# Patient Record
Sex: MALE | Race: BLACK OR AFRICAN AMERICAN | NOT HISPANIC OR LATINO | URBAN - METROPOLITAN AREA
[De-identification: names, ages, dates, MRNs, and addresses within clinical notes are randomized per-mention and may not be internally consistent; named-entity substitution may affect disease eponyms.]

---

## 2018-12-23 ENCOUNTER — EMERGENCY (EMERGENCY)
Facility: HOSPITAL | Age: 83
LOS: 0 days | Discharge: HOME | End: 2018-12-23
Attending: EMERGENCY MEDICINE
Payer: MEDICARE

## 2018-12-23 VITALS
RESPIRATION RATE: 16 BRPM | HEART RATE: 85 BPM | OXYGEN SATURATION: 98 % | SYSTOLIC BLOOD PRESSURE: 151 MMHG | TEMPERATURE: 99 F | DIASTOLIC BLOOD PRESSURE: 77 MMHG

## 2018-12-23 VITALS
HEART RATE: 64 BPM | OXYGEN SATURATION: 98 % | RESPIRATION RATE: 19 BRPM | TEMPERATURE: 98 F | DIASTOLIC BLOOD PRESSURE: 72 MMHG | WEIGHT: 160.06 LBS | SYSTOLIC BLOOD PRESSURE: 115 MMHG

## 2018-12-23 DIAGNOSIS — I10 ESSENTIAL (PRIMARY) HYPERTENSION: ICD-10-CM

## 2018-12-23 DIAGNOSIS — R42 DIZZINESS AND GIDDINESS: ICD-10-CM

## 2018-12-23 DIAGNOSIS — I49.9 CARDIAC ARRHYTHMIA, UNSPECIFIED: ICD-10-CM

## 2018-12-23 DIAGNOSIS — E11.9 TYPE 2 DIABETES MELLITUS WITHOUT COMPLICATIONS: ICD-10-CM

## 2018-12-23 DIAGNOSIS — N39.0 URINARY TRACT INFECTION, SITE NOT SPECIFIED: ICD-10-CM

## 2018-12-23 DIAGNOSIS — Z79.4 LONG TERM (CURRENT) USE OF INSULIN: ICD-10-CM

## 2018-12-23 DIAGNOSIS — Z79.82 LONG TERM (CURRENT) USE OF ASPIRIN: ICD-10-CM

## 2018-12-23 LAB
ALBUMIN SERPL ELPH-MCNC: 3.3 G/DL — SIGNIFICANT CHANGE UP (ref 3.3–5)
ALP SERPL-CCNC: 57 U/L — SIGNIFICANT CHANGE UP (ref 40–120)
ALT FLD-CCNC: 14 U/L — SIGNIFICANT CHANGE UP (ref 12–78)
ANION GAP SERPL CALC-SCNC: 6 MMOL/L — SIGNIFICANT CHANGE UP (ref 5–17)
APPEARANCE UR: ABNORMAL
AST SERPL-CCNC: 16 U/L — SIGNIFICANT CHANGE UP (ref 15–37)
BILIRUB SERPL-MCNC: 0.4 MG/DL — SIGNIFICANT CHANGE UP (ref 0.2–1.2)
BILIRUB UR-MCNC: NEGATIVE — SIGNIFICANT CHANGE UP
BUN SERPL-MCNC: 29 MG/DL — HIGH (ref 7–23)
CALCIUM SERPL-MCNC: 9 MG/DL — SIGNIFICANT CHANGE UP (ref 8.5–10.1)
CHLORIDE SERPL-SCNC: 108 MMOL/L — SIGNIFICANT CHANGE UP (ref 96–108)
CO2 SERPL-SCNC: 30 MMOL/L — SIGNIFICANT CHANGE UP (ref 22–31)
COLOR SPEC: YELLOW — SIGNIFICANT CHANGE UP
CREAT SERPL-MCNC: 1.57 MG/DL — HIGH (ref 0.5–1.3)
DIFF PNL FLD: ABNORMAL
FLUAV SPEC QL CULT: NEGATIVE — SIGNIFICANT CHANGE UP
FLUBV AG SPEC QL IA: NEGATIVE — SIGNIFICANT CHANGE UP
GLUCOSE SERPL-MCNC: 83 MG/DL — SIGNIFICANT CHANGE UP (ref 70–99)
GLUCOSE UR QL: NEGATIVE MG/DL — SIGNIFICANT CHANGE UP
HCT VFR BLD CALC: 42.5 % — SIGNIFICANT CHANGE UP (ref 39–50)
HGB BLD-MCNC: 13.4 G/DL — SIGNIFICANT CHANGE UP (ref 13–17)
KETONES UR-MCNC: NEGATIVE — SIGNIFICANT CHANGE UP
LEUKOCYTE ESTERASE UR-ACNC: ABNORMAL
MCHC RBC-ENTMCNC: 28.9 PG — SIGNIFICANT CHANGE UP (ref 27–34)
MCHC RBC-ENTMCNC: 31.5 GM/DL — LOW (ref 32–36)
MCV RBC AUTO: 91.6 FL — SIGNIFICANT CHANGE UP (ref 80–100)
NITRITE UR-MCNC: POSITIVE
NRBC # BLD: 0 /100 WBCS — SIGNIFICANT CHANGE UP (ref 0–0)
PH UR: 5 — SIGNIFICANT CHANGE UP (ref 5–8)
PLATELET # BLD AUTO: 151 K/UL — SIGNIFICANT CHANGE UP (ref 150–400)
POTASSIUM SERPL-MCNC: 4.4 MMOL/L — SIGNIFICANT CHANGE UP (ref 3.5–5.3)
POTASSIUM SERPL-SCNC: 4.4 MMOL/L — SIGNIFICANT CHANGE UP (ref 3.5–5.3)
PROT SERPL-MCNC: 8.4 GM/DL — HIGH (ref 6–8.3)
PROT UR-MCNC: 30 MG/DL
RBC # BLD: 4.64 M/UL — SIGNIFICANT CHANGE UP (ref 4.2–5.8)
RBC # FLD: 13.2 % — SIGNIFICANT CHANGE UP (ref 10.3–14.5)
SODIUM SERPL-SCNC: 144 MMOL/L — SIGNIFICANT CHANGE UP (ref 135–145)
SP GR SPEC: 1.01 — SIGNIFICANT CHANGE UP (ref 1.01–1.02)
TROPONIN I SERPL-MCNC: <.015 NG/ML — SIGNIFICANT CHANGE UP (ref 0.01–0.04)
UROBILINOGEN FLD QL: NEGATIVE MG/DL — SIGNIFICANT CHANGE UP
WBC # BLD: 6.71 K/UL — SIGNIFICANT CHANGE UP (ref 3.8–10.5)
WBC # FLD AUTO: 6.71 K/UL — SIGNIFICANT CHANGE UP (ref 3.8–10.5)

## 2018-12-23 PROCEDURE — 71045 X-RAY EXAM CHEST 1 VIEW: CPT | Mod: 26

## 2018-12-23 PROCEDURE — 70450 CT HEAD/BRAIN W/O DYE: CPT | Mod: 26

## 2018-12-23 PROCEDURE — 93010 ELECTROCARDIOGRAM REPORT: CPT

## 2018-12-23 PROCEDURE — 99285 EMERGENCY DEPT VISIT HI MDM: CPT

## 2018-12-23 RX ORDER — CIPROFLOXACIN LACTATE 400MG/40ML
1 VIAL (ML) INTRAVENOUS
Qty: 10 | Refills: 0 | OUTPATIENT
Start: 2018-12-23 | End: 2019-01-01

## 2018-12-23 RX ORDER — MECLIZINE HCL 12.5 MG
50 TABLET ORAL ONCE
Qty: 0 | Refills: 0 | Status: COMPLETED | OUTPATIENT
Start: 2018-12-23 | End: 2018-12-23

## 2018-12-23 RX ORDER — SODIUM CHLORIDE 9 MG/ML
1000 INJECTION INTRAMUSCULAR; INTRAVENOUS; SUBCUTANEOUS ONCE
Qty: 0 | Refills: 0 | Status: COMPLETED | OUTPATIENT
Start: 2018-12-23 | End: 2018-12-23

## 2018-12-23 RX ORDER — CIPROFLOXACIN LACTATE 400MG/40ML
500 VIAL (ML) INTRAVENOUS ONCE
Qty: 0 | Refills: 0 | Status: DISCONTINUED | OUTPATIENT
Start: 2018-12-23 | End: 2018-12-23

## 2018-12-23 RX ORDER — MECLIZINE HCL 12.5 MG
1 TABLET ORAL
Qty: 15 | Refills: 0
Start: 2018-12-23 | End: 2018-12-27

## 2018-12-23 RX ADMIN — Medication 50 MILLIGRAM(S): at 17:47

## 2018-12-23 RX ADMIN — SODIUM CHLORIDE 1000 MILLILITER(S): 9 INJECTION INTRAMUSCULAR; INTRAVENOUS; SUBCUTANEOUS at 18:50

## 2018-12-23 RX ADMIN — SODIUM CHLORIDE 1000 MILLILITER(S): 9 INJECTION INTRAMUSCULAR; INTRAVENOUS; SUBCUTANEOUS at 17:48

## 2018-12-23 NOTE — ED PROVIDER NOTE - MEDICAL DECISION MAKING DETAILS
89 yo M with veritgo, presyncope, r/o intracranial event   -CT brain, r/o other cause of vertigo, basic labs, ua, cx, flu swab, trop, cxr, ekg, meclizine, ns hydration, levo for UTI

## 2018-12-23 NOTE — ED ADULT NURSE NOTE - OBJECTIVE STATEMENT
a/o x4 c/o not feeling good yesterday, fell backwards in bed when he tries to get up, today he c/o dizziness and headache.

## 2018-12-23 NOTE — ED ADULT NURSE NOTE - PMH
Arrhythmia, long term    Diabetes    Dislocation of hip, left, closed  - repeated 3 times, last on 01/21/16  Hypertension

## 2018-12-23 NOTE — ED PROVIDER NOTE - OBJECTIVE STATEMENT
89 yo M with dizziness that started after using the bathroom earlier today (>6 hours ago).  Pt. says it felt like spinning, but almost like he was going to pass out.  He felt nauseas, didn't vomit.  He was better after laying down.  Daughter thought he should go to the ER for eval.  Pt. fought her but then accepted.  Pt. has no other complaints or associated symptoms.  At baseline mental status as per daughter.    ROS: negative for fever, cough, headache, chest pain, shortness of breath, abd pain, nausea, vomiting, diarrhea, rash, paresthesia, and weakness--all other systems reviewed are negative.   PMH: DM, HTN, a-fib, L hip replacement w/ multiple dislocations; Meds: See EMR; SH: Denies smoking/drinking/drug use

## 2018-12-23 NOTE — ED ADULT NURSE NOTE - TEMPLATE
After Visit Summary   5/4/2018    Shahana Donaldson    MRN: [id number]           Patient Information     Date Of Birth          1940        Visit Information        Provider Department      5/4/2018 7:05 AM Jeramie Curran MD [facility]        Today's Diagnoses     Chronic pain of right knee    -  1      Care Instructions    Primary Care Center: [phone]     Primary Care Center Medication Refill Request Information:  * Please contact your pharmacy regarding ANY request for medication refills.  ** [facility] Prescription Fax = [phone]  * Please allow 3 business days for routine medication refills.  * Please allow 5 business days for controlled substance medication refills.     Primary Care Center Test Result notification information:  *You will be notified with in 7-10 days of your appointment day regarding the results of your test.  If you are on MyChart you will be notified as soon as the provider has reviewed the results and signed off on them.      Schedule Physical Therapy             Follow-ups after your visit        Additional Services     PHYSICAL THERAPY REFERRAL       Knee pain                  Your next 10 appointments already scheduled     May 09, 2018 11:00 AM CDT   XR ESOPHAGRAM with UCXR2, UC GIGU RAD   [facility] ([facility])    [address] [address] [phone]           Please bring a list of your current medicines to your exam. (Include vitamins, minerals and over-the-counter medicines.) Leave your valuables at home.  Tell the doctor if there is a chance you could be pregnant.  Do not eat for 4 hours before the exam. Keep drinking clear liquids until 2 hours before the exam.  You may take pain medicine (with a sip of water) up to 4 hours before the exam.  Do not swallow any other medicines unless your doctor tells you to. Talk to your doctor to be sure it s safe to stop  "your medicines.  Please call the Imaging Department at your exam site with any questions.            May 09, 2018 12:00 PM CDT   (Arrive by 11:45 AM)   Return Visit with Leonidas Valle MD   [facility] ([facility])    [address]  [address]  [facility] [address]   [phone]            May 29, 2018  8:15 AM CDT   RETURN GENERAL with Josue Trujillo MD   [facility] ([facility])    [address]  [address] [address]  [facility] [facility] [address]   [phone]            Nov 21, 2018  7:05 AM CST   (Arrive by 6:50 AM)   Return Visit with Jeramie Curran MD   [facility] ([facility])    [address]  4th [address]  [facility] [id number] [phone]            Nov 21, 2018 10:00 AM CST   MA SCREENING DIGITAL BILATERAL with [id number]   [facility] ([facility])    [address]  2nd Floor  [facility] [id number] [phone]           Do not use any powder, lotion or deodorant under your arms or on your breast. If you do, we will ask you to remove it before your exam.  Wear comfortable, two-piece clothing.  If you have any allergies, tell your care team.  Bring any previous mammograms from other facilities or have them mailed to the breast center. Three-dimensional (3D) mammograms are available at [place] locations in [facility], [facility], [facility], and [place]. [facility] locations include [place] [facility] in [place]. Benefits of 3D mammograms include: - Improved rate of cancer detection - Decreases your chance of having to go back for more tests, which means fewer: - \"False-positive\" results (This means that there is an abnormal area but it isn't cancer.) - Invasive testing procedures, such as a biopsy or surgery - Can provide clearer " images of the breast if you have dense breast tissue. 3D mammography is an optional exam that anyone can have with a 2D mammogram. It doesn't replace or take the place of a 2D mammogram. 2D mammograms remain an effective screening test for all women.  Not all insurance companies cover the cost of a 3D mammogram. Check with your insurance.              Who to contact     Please call your clinic at [phone] to:    Ask questions about your health    Make or cancel appointments    Discuss your medicines    Learn about your test results    Speak to your doctor            Additional Information About Your Visit        [employer] Information     [email or web address] gives you secure access to your electronic health record. If you see a primary care provider, you can also send messages to your care team and make appointments. If you have questions, please call your primary care clinic.  If you do not have a primary care provider, please call [phone] and they will assist you.      [email or web address] is an electronic gateway that provides easy, online access to your medical records. With [email or web address], you can request a clinic appointment, read your test results, renew a prescription or communicate with your care team.     To access your existing account, please contact your [facility] or call [phone] for assistance.        Care EveryWhere ID     This is your Care EveryWhere ID. This could be used by other organizations to access your [facility] medical records  [id number]        Your Vitals Were     Pulse Respirations Pulse Oximetry BMI (Body Mass Index)          79 20 96% 23.4 kg/m2         Blood Pressure from Last 3 Encounters:   05/04/18 155/80   04/17/18 158/74   04/03/18 158/90    Weight from Last 3 Encounters:   05/04/18 69.8 kg (153 lb 14.4 oz)   04/17/18 69.8 kg (153 lb 12.8 oz)   04/03/18 69.4 kg (153 lb)              We Performed the Following     PHYSICAL THERAPY REFERRAL        Primary Care Provider  Office Phone # Fax #    Jeramie Curran [phone] [address] [address]        Equal Access to Services     ERNESTO NEFF : Hadii aad ku hadjulio Valdovinos, waednada luqadaha, qaybta kaalmada asad, celso russell laDoreenjenise crane. So [facility] [phone].    ATENCIÓN: Si habla español, tiene a quiroz disposición servicios gratuitos de asistencia lingüística. Llame al [phone].    We comply with applicable federal civil rights laws and [place] laws. We do not discriminate on the basis of race, color, national origin, age, disability, sex, sexual orientation, or gender identity.            Thank you!     Thank you for choosing [facility]  for your care. Our goal is always to provide you with excellent care. Hearing back from our patients is one way we can continue to improve our services. Please take a few minutes to complete the written survey that you may receive in the mail after your visit with us. Thank you!             Your Updated Medication List - Protect others around you: Learn how to safely use, store and throw away your medicines at www.disposemymeds.org.          This list is accurate as of 5/4/18  7:22 AM.  Always use your most recent med list.                   Brand Name Dispense Instructions for use Diagnosis    ascorbic acid 500 MG tablet    VITAMIN C     Take 500 mg by mouth as needed.        aspirin 81 MG tablet     100    1 tab po QD (Once per day)    Mixed hyperlipidemia       atorvastatin 10 MG tablet    LIPITOR    90 tablet    Take 1 tablet (10 mg) by mouth daily    Mixed hyperlipidemia       cholecalciferol 1000 UNIT tablet    vitamin D3     Take 1 tablet by mouth daily.        clobetasol 0.05 % ointment    TEMOVATE    30 g    Apply sparingly to affected area 2 x daily for14 days. Then, apply small amount to affected area twice weekly for maintenance.    Lichen sclerosus       diclofenac 1 % Gel topical gel     VOLTAREN    100 g    Apply 4 grams to knees or 2 grams to hands four times daily using enclosed dosing card.    Right knee pain       DILT- MG 24 hr capsule   Generic drug:  diltiazem     90 capsule    TAKE 1 CAPSULE BY MOUTH ONCE DAILY    Essential hypertension       diphenhydrAMINE 25 MG tablet    BENADRYL    20 tablet    Take 1 tablet (25 mg) by mouth nightly as needed for itching or allergies    Acute sinusitis       omeprazole 40 MG capsule    priLOSEC    90 capsule    Take 1 capsule (40 mg) by mouth daily as needed    S/P Nissen fundoplication (without gastrostomy tube) procedure       PROBIOTIC FORMULA PO      Take by mouth as needed    Routine gynecological examination, Atypical chest pain          Neuro

## 2018-12-23 NOTE — ED PROVIDER NOTE - PHYSICAL EXAMINATION
Vitals: WNL  Gen: AAOx3, NAD, sitting comfortably in stretcher, calm, cooperative, non-toxic, answering question appropriately,   Head: ncat, perrla, eomi b/l  Neck: supple, no lymphadenopathy, no midline deviation  Heart: rrr, no m/r/g  Lungs: CTA b/l, no rales/ronchi/wheezes  Abd: soft, nontender, non-distended, no rebound or guarding  Ext: no clubbing/cyanosis/edema  Neuro: sensation and muscle strength intact b/l, no focal weakness

## 2018-12-23 NOTE — ED ADULT TRIAGE NOTE - CHIEF COMPLAINT QUOTE
patient c/o of headache and dizziness , patient also c/o of weakness , denied chest pain , denied difficulty breathing , denied N/V denied blurred vision

## 2019-03-17 ENCOUNTER — EMERGENCY (EMERGENCY)
Facility: HOSPITAL | Age: 84
LOS: 0 days | Discharge: HOME | End: 2019-03-18
Attending: EMERGENCY MEDICINE
Payer: MEDICARE

## 2019-03-17 VITALS
DIASTOLIC BLOOD PRESSURE: 82 MMHG | RESPIRATION RATE: 20 BRPM | WEIGHT: 164.91 LBS | OXYGEN SATURATION: 98 % | HEIGHT: 73 IN | SYSTOLIC BLOOD PRESSURE: 136 MMHG | TEMPERATURE: 98 F | HEART RATE: 90 BPM

## 2019-03-17 DIAGNOSIS — R53.1 WEAKNESS: ICD-10-CM

## 2019-03-17 DIAGNOSIS — R53.81 OTHER MALAISE: ICD-10-CM

## 2019-03-17 DIAGNOSIS — Z79.82 LONG TERM (CURRENT) USE OF ASPIRIN: ICD-10-CM

## 2019-03-17 DIAGNOSIS — I10 ESSENTIAL (PRIMARY) HYPERTENSION: ICD-10-CM

## 2019-03-17 DIAGNOSIS — I48.91 UNSPECIFIED ATRIAL FIBRILLATION: ICD-10-CM

## 2019-03-17 DIAGNOSIS — Z79.899 OTHER LONG TERM (CURRENT) DRUG THERAPY: ICD-10-CM

## 2019-03-17 DIAGNOSIS — Z96.642 PRESENCE OF LEFT ARTIFICIAL HIP JOINT: ICD-10-CM

## 2019-03-17 DIAGNOSIS — E11.9 TYPE 2 DIABETES MELLITUS WITHOUT COMPLICATIONS: ICD-10-CM

## 2019-03-17 PROCEDURE — 70450 CT HEAD/BRAIN W/O DYE: CPT | Mod: 26

## 2019-03-17 PROCEDURE — 71045 X-RAY EXAM CHEST 1 VIEW: CPT | Mod: 26

## 2019-03-17 PROCEDURE — 99285 EMERGENCY DEPT VISIT HI MDM: CPT

## 2019-03-17 RX ORDER — SODIUM CHLORIDE 9 MG/ML
1000 INJECTION INTRAMUSCULAR; INTRAVENOUS; SUBCUTANEOUS ONCE
Qty: 0 | Refills: 0 | Status: COMPLETED | OUTPATIENT
Start: 2019-03-17 | End: 2019-03-17

## 2019-03-17 RX ADMIN — SODIUM CHLORIDE 1000 MILLILITER(S): 9 INJECTION INTRAMUSCULAR; INTRAVENOUS; SUBCUTANEOUS at 23:41

## 2019-03-17 NOTE — ED PROVIDER NOTE - PHYSICAL EXAMINATION
Vitals: WNL  Gen: AAOx2, NAD, sitting comfortably in stretcher  Head: ncat, perrla, eomi b/l  Neck: supple, no lymphadenopathy, no midline deviation  Heart: rrr, no m/r/g  Lungs: CTA b/l, no rales/ronchi/wheezes  Abd: soft, nontender, non-distended, no rebound or guarding  Ext: no clubbing/cyanosis/edema  Neuro: sensation and muscle strength intact b/l, no focal weakness, CN2-12 intact b/l

## 2019-03-17 NOTE — ED PROVIDER NOTE - OBJECTIVE STATEMENT
89 yo M with general weakness x 2 days.  Pt. went to urgent care who was concerned about EKG and oxygen saturation.  Pt. was referred to ER.  Pt. has had UTIs in the past, with similar presentations to current.  No other complaints.  Pt. is otherwise at baseline, and well. 89 yo M with general weakness x 2 days.  Pt. went to urgent care who was concerned about EKG and oxygen saturation.  Pt. was referred to ER.  Pt. has had UTIs in the past, with similar presentations to current.  No other complaints.  Pt. is otherwise at baseline, and well.  ROS: negative for fever, cough, headache, chest pain, shortness of breath, abd pain, nausea, vomiting, diarrhea, rash, paresthesia, and focal weakness--all other systems reviewed are negative.   PMH: DM, HTN, a-fib, L hip replacement w/ multiple dislocations; Meds: See EMR; SH: Denies smoking/drinking/drug use

## 2019-03-17 NOTE — ED PROVIDER NOTE - CLINICAL SUMMARY MEDICAL DECISION MAKING FREE TEXT BOX
89 yo M with generalized weakness, likely UTI, r/o other infectious cause, doubt ischemia  -basic labs, coags, ckmb, trop, coags, ua, cx, bnp, ct brain, monitor, iv line, ns hydration, ekg

## 2019-03-17 NOTE — ED ADULT TRIAGE NOTE - CHIEF COMPLAINT QUOTE
c/o generalized weakness x 2 days, decreased appetite seen at urgent care and recommended to have eval for low o2 sat on r/a in er states ekg done at urgent care shows irregular heartbeat pt with known hx afib denies chest pain states intermittent shortness of breath, hx chf

## 2019-03-17 NOTE — ED PROVIDER NOTE - PROGRESS NOTE DETAILS
Results reported to patient--grossly benign, labs wnl, ua shows UTI  Pt. reports feeling better after meds  pt. agrees to f/u with primary care outpt.  pt. understands to return to ED if symptoms worsen; will d/c

## 2019-03-17 NOTE — ED ADULT NURSE NOTE - OBJECTIVE STATEMENT
Patient reports "I have been dizzy. My heartbeat has been irregular and my breathing pattern has change. The usual arthritic pain."

## 2019-03-17 NOTE — ED ADULT NURSE NOTE - NSIMPLEMENTINTERV_GEN_ALL_ED
Implemented All Fall with Harm Risk Interventions:  [place] to call system. Call bell, personal items and telephone within reach. Instruct patient to call for assistance. Room bathroom lighting operational. Non-slip footwear when patient is off stretcher. Physically safe environment: no spills, clutter or unnecessary equipment. Stretcher in lowest position, wheels locked, appropriate side rails in place. Provide visual cue, wrist band, yellow gown, etc. Monitor gait and stability. Monitor for mental status changes and reorient to person, place, and time. Review medications for side effects contributing to fall risk. Reinforce activity limits and safety measures with patient and family. Provide visual clues: red socks.

## 2019-03-17 NOTE — ED ADULT NURSE NOTE - CHPI ED NUR SYMPTOMS NEG
no loss of consciousness/no blurred vision/no change in level of consciousness/no confusion/no numbness/no fever/no nausea/no vomiting

## 2019-03-18 VITALS
HEART RATE: 96 BPM | DIASTOLIC BLOOD PRESSURE: 93 MMHG | RESPIRATION RATE: 13 BRPM | OXYGEN SATURATION: 100 % | TEMPERATURE: 98 F | SYSTOLIC BLOOD PRESSURE: 137 MMHG

## 2019-03-18 LAB
ALBUMIN SERPL ELPH-MCNC: 3.4 G/DL — SIGNIFICANT CHANGE UP (ref 3.3–5)
ALP SERPL-CCNC: 62 U/L — SIGNIFICANT CHANGE UP (ref 40–120)
ALT FLD-CCNC: 18 U/L — SIGNIFICANT CHANGE UP (ref 12–78)
ANION GAP SERPL CALC-SCNC: 2 MMOL/L — LOW (ref 5–17)
APPEARANCE UR: CLEAR — SIGNIFICANT CHANGE UP
APTT BLD: 28.1 SEC — SIGNIFICANT CHANGE UP (ref 27.5–36.3)
AST SERPL-CCNC: 21 U/L — SIGNIFICANT CHANGE UP (ref 15–37)
BACTERIA # UR AUTO: ABNORMAL
BILIRUB SERPL-MCNC: 0.3 MG/DL — SIGNIFICANT CHANGE UP (ref 0.2–1.2)
BILIRUB UR-MCNC: NEGATIVE — SIGNIFICANT CHANGE UP
BUN SERPL-MCNC: 25 MG/DL — HIGH (ref 7–23)
CALCIUM SERPL-MCNC: 8.7 MG/DL — SIGNIFICANT CHANGE UP (ref 8.5–10.1)
CHLORIDE SERPL-SCNC: 106 MMOL/L — SIGNIFICANT CHANGE UP (ref 96–108)
CK MB CFR SERPL CALC: 1.1 NG/ML — SIGNIFICANT CHANGE UP (ref 0.5–3.6)
CO2 SERPL-SCNC: 33 MMOL/L — HIGH (ref 22–31)
COLOR SPEC: YELLOW — SIGNIFICANT CHANGE UP
CREAT SERPL-MCNC: 1.66 MG/DL — HIGH (ref 0.5–1.3)
DIFF PNL FLD: ABNORMAL
EPI CELLS # UR: SIGNIFICANT CHANGE UP
GLUCOSE SERPL-MCNC: 81 MG/DL — SIGNIFICANT CHANGE UP (ref 70–99)
GLUCOSE UR QL: NEGATIVE MG/DL — SIGNIFICANT CHANGE UP
HCT VFR BLD CALC: 45.8 % — SIGNIFICANT CHANGE UP (ref 39–50)
HGB BLD-MCNC: 14.4 G/DL — SIGNIFICANT CHANGE UP (ref 13–17)
INR BLD: 1.12 RATIO — SIGNIFICANT CHANGE UP (ref 0.88–1.16)
KETONES UR-MCNC: NEGATIVE — SIGNIFICANT CHANGE UP
LEUKOCYTE ESTERASE UR-ACNC: ABNORMAL
MCHC RBC-ENTMCNC: 28.7 PG — SIGNIFICANT CHANGE UP (ref 27–34)
MCHC RBC-ENTMCNC: 31.4 GM/DL — LOW (ref 32–36)
MCV RBC AUTO: 91.2 FL — SIGNIFICANT CHANGE UP (ref 80–100)
NITRITE UR-MCNC: NEGATIVE — SIGNIFICANT CHANGE UP
NRBC # BLD: 0 /100 WBCS — SIGNIFICANT CHANGE UP (ref 0–0)
NT-PROBNP SERPL-SCNC: 751 PG/ML — HIGH (ref 0–450)
PH UR: 5 — SIGNIFICANT CHANGE UP (ref 5–8)
PLATELET # BLD AUTO: 134 K/UL — LOW (ref 150–400)
POTASSIUM SERPL-MCNC: 4.3 MMOL/L — SIGNIFICANT CHANGE UP (ref 3.5–5.3)
POTASSIUM SERPL-SCNC: 4.3 MMOL/L — SIGNIFICANT CHANGE UP (ref 3.5–5.3)
PROT SERPL-MCNC: 8 GM/DL — SIGNIFICANT CHANGE UP (ref 6–8.3)
PROT UR-MCNC: 15 MG/DL
PROTHROM AB SERPL-ACNC: 12.6 SEC — SIGNIFICANT CHANGE UP (ref 10–12.9)
RBC # BLD: 5.02 M/UL — SIGNIFICANT CHANGE UP (ref 4.2–5.8)
RBC # FLD: 12.8 % — SIGNIFICANT CHANGE UP (ref 10.3–14.5)
RBC CASTS # UR COMP ASSIST: SIGNIFICANT CHANGE UP /HPF (ref 0–4)
SODIUM SERPL-SCNC: 141 MMOL/L — SIGNIFICANT CHANGE UP (ref 135–145)
SP GR SPEC: 1.01 — SIGNIFICANT CHANGE UP (ref 1.01–1.02)
TROPONIN I SERPL-MCNC: <.015 NG/ML — SIGNIFICANT CHANGE UP (ref 0.01–0.04)
UROBILINOGEN FLD QL: NEGATIVE MG/DL — SIGNIFICANT CHANGE UP
WBC # BLD: 7.6 K/UL — SIGNIFICANT CHANGE UP (ref 3.8–10.5)
WBC # FLD AUTO: 7.6 K/UL — SIGNIFICANT CHANGE UP (ref 3.8–10.5)
WBC UR QL: ABNORMAL

## 2019-03-18 RX ORDER — MOXIFLOXACIN HYDROCHLORIDE TABLETS, 400 MG 400 MG/1
1 TABLET, FILM COATED ORAL
Qty: 14 | Refills: 0
Start: 2019-03-18 | End: 2019-03-24

## 2019-03-18 RX ORDER — CIPROFLOXACIN LACTATE 400MG/40ML
500 VIAL (ML) INTRAVENOUS ONCE
Qty: 0 | Refills: 0 | Status: COMPLETED | OUTPATIENT
Start: 2019-03-18 | End: 2019-03-18

## 2019-03-18 RX ADMIN — SODIUM CHLORIDE 1000 MILLILITER(S): 9 INJECTION INTRAMUSCULAR; INTRAVENOUS; SUBCUTANEOUS at 02:22

## 2019-03-18 RX ADMIN — Medication 500 MILLIGRAM(S): at 02:25

## 2019-06-21 ENCOUNTER — INPATIENT (INPATIENT)
Facility: HOSPITAL | Age: 84
LOS: 10 days | Discharge: NURSING, REHAB OR LONG-TERM CARE | End: 2019-07-02
Attending: INTERNAL MEDICINE | Admitting: INTERNAL MEDICINE
Payer: MEDICARE

## 2019-06-21 VITALS
RESPIRATION RATE: 16 BRPM | TEMPERATURE: 100 F | DIASTOLIC BLOOD PRESSURE: 74 MMHG | OXYGEN SATURATION: 96 % | HEART RATE: 98 BPM | SYSTOLIC BLOOD PRESSURE: 126 MMHG

## 2019-06-21 DIAGNOSIS — N39.0 URINARY TRACT INFECTION, SITE NOT SPECIFIED: ICD-10-CM

## 2019-06-21 LAB
ALBUMIN SERPL ELPH-MCNC: 4.2 G/DL — SIGNIFICANT CHANGE UP (ref 3.3–5)
ALP SERPL-CCNC: 60 U/L — SIGNIFICANT CHANGE UP (ref 40–120)
ALT FLD-CCNC: 11 U/L — SIGNIFICANT CHANGE UP (ref 4–41)
ANION GAP SERPL CALC-SCNC: 11 MMO/L — SIGNIFICANT CHANGE UP (ref 7–14)
APPEARANCE UR: CLEAR — SIGNIFICANT CHANGE UP
APTT BLD: 27.2 SEC — LOW (ref 27.5–36.3)
AST SERPL-CCNC: 22 U/L — SIGNIFICANT CHANGE UP (ref 4–40)
B PERT DNA SPEC QL NAA+PROBE: NOT DETECTED — SIGNIFICANT CHANGE UP
BACTERIA # UR AUTO: NEGATIVE — SIGNIFICANT CHANGE UP
BASE EXCESS BLDV CALC-SCNC: 6.6 MMOL/L — SIGNIFICANT CHANGE UP
BASOPHILS # BLD AUTO: 0.04 K/UL — SIGNIFICANT CHANGE UP (ref 0–0.2)
BASOPHILS NFR BLD AUTO: 0.3 % — SIGNIFICANT CHANGE UP (ref 0–2)
BILIRUB SERPL-MCNC: 0.9 MG/DL — SIGNIFICANT CHANGE UP (ref 0.2–1.2)
BILIRUB UR-MCNC: NEGATIVE — SIGNIFICANT CHANGE UP
BLOOD GAS VENOUS - CREATININE: 1.49 MG/DL — HIGH (ref 0.5–1.3)
BLOOD UR QL VISUAL: SIGNIFICANT CHANGE UP
BUN SERPL-MCNC: 18 MG/DL — SIGNIFICANT CHANGE UP (ref 7–23)
C PNEUM DNA SPEC QL NAA+PROBE: NOT DETECTED — SIGNIFICANT CHANGE UP
CALCIUM SERPL-MCNC: 10 MG/DL — SIGNIFICANT CHANGE UP (ref 8.4–10.5)
CHLORIDE BLDV-SCNC: 106 MMOL/L — SIGNIFICANT CHANGE UP (ref 96–108)
CHLORIDE SERPL-SCNC: 99 MMOL/L — SIGNIFICANT CHANGE UP (ref 98–107)
CO2 SERPL-SCNC: 28 MMOL/L — SIGNIFICANT CHANGE UP (ref 22–31)
COLOR SPEC: YELLOW — SIGNIFICANT CHANGE UP
CREAT SERPL-MCNC: 1.46 MG/DL — HIGH (ref 0.5–1.3)
EOSINOPHIL # BLD AUTO: 0.07 K/UL — SIGNIFICANT CHANGE UP (ref 0–0.5)
EOSINOPHIL NFR BLD AUTO: 0.5 % — SIGNIFICANT CHANGE UP (ref 0–6)
FLUAV H1 2009 PAND RNA SPEC QL NAA+PROBE: NOT DETECTED — SIGNIFICANT CHANGE UP
FLUAV H1 RNA SPEC QL NAA+PROBE: NOT DETECTED — SIGNIFICANT CHANGE UP
FLUAV H3 RNA SPEC QL NAA+PROBE: NOT DETECTED — SIGNIFICANT CHANGE UP
FLUAV SUBTYP SPEC NAA+PROBE: NOT DETECTED — SIGNIFICANT CHANGE UP
FLUBV RNA SPEC QL NAA+PROBE: NOT DETECTED — SIGNIFICANT CHANGE UP
GAS PNL BLDV: 137 MMOL/L — SIGNIFICANT CHANGE UP (ref 136–146)
GLUCOSE BLDV-MCNC: 97 MG/DL — SIGNIFICANT CHANGE UP (ref 70–99)
GLUCOSE SERPL-MCNC: 99 MG/DL — SIGNIFICANT CHANGE UP (ref 70–99)
GLUCOSE UR-MCNC: NEGATIVE — SIGNIFICANT CHANGE UP
HADV DNA SPEC QL NAA+PROBE: NOT DETECTED — SIGNIFICANT CHANGE UP
HCO3 BLDV-SCNC: 26 MMOL/L — SIGNIFICANT CHANGE UP (ref 20–27)
HCOV PNL SPEC NAA+PROBE: SIGNIFICANT CHANGE UP
HCT VFR BLD CALC: 51.1 % — HIGH (ref 39–50)
HCT VFR BLDV CALC: 50.9 % — SIGNIFICANT CHANGE UP (ref 39–51)
HGB BLD-MCNC: 15.6 G/DL — SIGNIFICANT CHANGE UP (ref 13–17)
HGB BLDV-MCNC: 16.6 G/DL — SIGNIFICANT CHANGE UP (ref 13–17)
HMPV RNA SPEC QL NAA+PROBE: NOT DETECTED — SIGNIFICANT CHANGE UP
HPIV1 RNA SPEC QL NAA+PROBE: NOT DETECTED — SIGNIFICANT CHANGE UP
HPIV2 RNA SPEC QL NAA+PROBE: NOT DETECTED — SIGNIFICANT CHANGE UP
HPIV3 RNA SPEC QL NAA+PROBE: NOT DETECTED — SIGNIFICANT CHANGE UP
HPIV4 RNA SPEC QL NAA+PROBE: NOT DETECTED — SIGNIFICANT CHANGE UP
HYALINE CASTS # UR AUTO: NEGATIVE — SIGNIFICANT CHANGE UP
IMM GRANULOCYTES NFR BLD AUTO: 0.4 % — SIGNIFICANT CHANGE UP (ref 0–1.5)
INR BLD: 1.18 — HIGH (ref 0.88–1.17)
KETONES UR-MCNC: NEGATIVE — SIGNIFICANT CHANGE UP
LACTATE BLDV-MCNC: 1.7 MMOL/L — SIGNIFICANT CHANGE UP (ref 0.5–2)
LEUKOCYTE ESTERASE UR-ACNC: SIGNIFICANT CHANGE UP
LYMPHOCYTES # BLD AUTO: 0.62 K/UL — LOW (ref 1–3.3)
LYMPHOCYTES # BLD AUTO: 4.2 % — LOW (ref 13–44)
MAGNESIUM SERPL-MCNC: 1.9 MG/DL — SIGNIFICANT CHANGE UP (ref 1.6–2.6)
MCHC RBC-ENTMCNC: 27.8 PG — SIGNIFICANT CHANGE UP (ref 27–34)
MCHC RBC-ENTMCNC: 30.5 % — LOW (ref 32–36)
MCV RBC AUTO: 90.9 FL — SIGNIFICANT CHANGE UP (ref 80–100)
MONOCYTES # BLD AUTO: 1.21 K/UL — HIGH (ref 0–0.9)
MONOCYTES NFR BLD AUTO: 8.1 % — SIGNIFICANT CHANGE UP (ref 2–14)
NEUTROPHILS # BLD AUTO: 12.93 K/UL — HIGH (ref 1.8–7.4)
NEUTROPHILS NFR BLD AUTO: 86.5 % — HIGH (ref 43–77)
NITRITE UR-MCNC: NEGATIVE — SIGNIFICANT CHANGE UP
NRBC # FLD: 0 K/UL — SIGNIFICANT CHANGE UP (ref 0–0)
NT-PROBNP SERPL-SCNC: 1659 PG/ML — SIGNIFICANT CHANGE UP
PCO2 BLDV: 59 MMHG — HIGH (ref 41–51)
PH BLDV: 7.36 PH — SIGNIFICANT CHANGE UP (ref 7.32–7.43)
PH UR: 6 — SIGNIFICANT CHANGE UP (ref 5–8)
PHOSPHATE SERPL-MCNC: 2.8 MG/DL — SIGNIFICANT CHANGE UP (ref 2.5–4.5)
PLATELET # BLD AUTO: 141 K/UL — LOW (ref 150–400)
PMV BLD: 11.2 FL — SIGNIFICANT CHANGE UP (ref 7–13)
PO2 BLDV: 18 MMHG — LOW (ref 35–40)
POTASSIUM BLDV-SCNC: 4.1 MMOL/L — SIGNIFICANT CHANGE UP (ref 3.4–4.5)
POTASSIUM SERPL-MCNC: 4.2 MMOL/L — SIGNIFICANT CHANGE UP (ref 3.5–5.3)
POTASSIUM SERPL-SCNC: 4.2 MMOL/L — SIGNIFICANT CHANGE UP (ref 3.5–5.3)
PROT SERPL-MCNC: 8.9 G/DL — HIGH (ref 6–8.3)
PROT UR-MCNC: 20 — SIGNIFICANT CHANGE UP
PROTHROM AB SERPL-ACNC: 13.2 SEC — HIGH (ref 9.8–13.1)
RBC # BLD: 5.62 M/UL — SIGNIFICANT CHANGE UP (ref 4.2–5.8)
RBC # FLD: 12.7 % — SIGNIFICANT CHANGE UP (ref 10.3–14.5)
RBC CASTS # UR COMP ASSIST: SIGNIFICANT CHANGE UP (ref 0–?)
RSV RNA SPEC QL NAA+PROBE: NOT DETECTED — SIGNIFICANT CHANGE UP
RV+EV RNA SPEC QL NAA+PROBE: NOT DETECTED — SIGNIFICANT CHANGE UP
SAO2 % BLDV: 21.1 % — LOW (ref 60–85)
SODIUM SERPL-SCNC: 138 MMOL/L — SIGNIFICANT CHANGE UP (ref 135–145)
SP GR SPEC: 1.01 — SIGNIFICANT CHANGE UP (ref 1–1.04)
SQUAMOUS # UR AUTO: SIGNIFICANT CHANGE UP
TROPONIN T, HIGH SENSITIVITY: 21 NG/L — SIGNIFICANT CHANGE UP (ref ?–14)
TSH SERPL-MCNC: 1.67 UIU/ML — SIGNIFICANT CHANGE UP (ref 0.27–4.2)
UROBILINOGEN FLD QL: NORMAL — SIGNIFICANT CHANGE UP
WBC # BLD: 14.93 K/UL — HIGH (ref 3.8–10.5)
WBC # FLD AUTO: 14.93 K/UL — HIGH (ref 3.8–10.5)
WBC UR QL: HIGH (ref 0–?)

## 2019-06-21 PROCEDURE — 71045 X-RAY EXAM CHEST 1 VIEW: CPT | Mod: 26

## 2019-06-21 PROCEDURE — 99223 1ST HOSP IP/OBS HIGH 75: CPT

## 2019-06-21 RX ORDER — AZITHROMYCIN 500 MG/1
500 TABLET, FILM COATED ORAL ONCE
Refills: 0 | Status: COMPLETED | OUTPATIENT
Start: 2019-06-21 | End: 2019-06-21

## 2019-06-21 RX ORDER — CEFTRIAXONE 500 MG/1
1000 INJECTION, POWDER, FOR SOLUTION INTRAMUSCULAR; INTRAVENOUS ONCE
Refills: 0 | Status: COMPLETED | OUTPATIENT
Start: 2019-06-21 | End: 2019-06-21

## 2019-06-21 RX ORDER — ACETAMINOPHEN 500 MG
650 TABLET ORAL ONCE
Refills: 0 | Status: COMPLETED | OUTPATIENT
Start: 2019-06-21 | End: 2019-06-21

## 2019-06-21 RX ORDER — SODIUM CHLORIDE 9 MG/ML
500 INJECTION INTRAMUSCULAR; INTRAVENOUS; SUBCUTANEOUS ONCE
Refills: 0 | Status: COMPLETED | OUTPATIENT
Start: 2019-06-21 | End: 2019-06-21

## 2019-06-21 RX ADMIN — Medication 650 MILLIGRAM(S): at 17:01

## 2019-06-21 RX ADMIN — AZITHROMYCIN 250 MILLIGRAM(S): 500 TABLET, FILM COATED ORAL at 20:59

## 2019-06-21 RX ADMIN — SODIUM CHLORIDE 500 MILLILITER(S): 9 INJECTION INTRAMUSCULAR; INTRAVENOUS; SUBCUTANEOUS at 17:26

## 2019-06-21 RX ADMIN — CEFTRIAXONE 100 MILLIGRAM(S): 500 INJECTION, POWDER, FOR SOLUTION INTRAMUSCULAR; INTRAVENOUS at 17:26

## 2019-06-21 NOTE — ED ADULT NURSE NOTE - NSIMPLEMENTINTERV_GEN_ALL_ED
Implemented All Fall Risk Interventions:  [place] to call system. Call bell, personal items and telephone within reach. Instruct patient to call for assistance. Room bathroom lighting operational. Non-slip footwear when patient is off stretcher. Physically safe environment: no spills, clutter or unnecessary equipment. Stretcher in lowest position, wheels locked, appropriate side rails in place. Provide visual cue, wrist band, yellow gown, etc. Monitor gait and stability. Monitor for mental status changes and reorient to person, place, and time. Review medications for side effects contributing to fall risk. Reinforce activity limits and safety measures with patient and family.

## 2019-06-21 NOTE — ED PROVIDER NOTE - PHYSICAL EXAMINATION
General: NAD, good hygiene, well developed  HENT: Atraumatic, EMOI, no conjunctivae injection, moist mucosa, no post. oropharynx erythema, exudates  Neck: normal ROM and trachea midline   Cardiovascular: tachy, S1&2, no M or R, radial pulses equal and b/l  Respiratory: b/l crackles no wheezes, no decreased breath sounds  Abdominal: soft and non-tender non distended, neg for guarding, veliz's sign, rovsing's sign, mcburney's sign, no CVA tenderness   Extremities: no edema of the legs/feet, DP/PT equal b/l  Skin: warm, well perfused  Neurologic: nonfocal, AAOx3  Psych: normal mood and affect

## 2019-06-21 NOTE — ED ADULT NURSE REASSESSMENT NOTE - NS ED NURSE REASSESS COMMENT FT1
report received from RN Oriana FAGAN.  pt resting comfortably in stretcher, denies CP/SOB at this time, placed on CM showing NSR, VS as charted, appears in NAD, will monitor

## 2019-06-21 NOTE — ED PROVIDER NOTE - OBJECTIVE STATEMENT
89M, BIBEMS, HTN, DM, chronic recent uti, arrhythmia p.w reproductive (yellow) cough and generalized weakness for 1 wk. Pt has increase urine freq, denied dysuria, chestpain, shortness of breath, fever. lives with brothers.  PCP: PAPA Ortiz

## 2019-06-21 NOTE — ED PROVIDER NOTE - ATTENDING CONTRIBUTION TO CARE
DR. BLOCH, ATTENDING MD-  I performed a face to face bedside interview with patient regarding history of present illness, review of symptoms and past medical history. I completed an independent physical exam.  I have discussed patient's plan of care with the resident.  Patient alert oriented, mildly ill appearing, NAD mucous membranes moist, thraot nml lungs crackles bilateral bases, heart sounds nml abd soft nontender, no CVA tenderness, trace pedal edema, pulse present, motor generalized, mild weakness, sensation intact, mild right hip tenderness and pain on ROM.

## 2019-06-21 NOTE — ED ADULT NURSE NOTE - OBJECTIVE STATEMENT
89 yo male, coming from home, brought in by EMS for weakness, dizziness, headache and cough. pt reports having productive (yellow ) cough x 2 weeks, associated with weakness. pt reports headache started last night , associated with dizziness. pt reports chronic bilateral hip pain. pt has non pitting swelling to feet bilaterally, pt reports this is chronic. pt reports being ambulatory with a walker at home. pt has multiple scabbed over blisters to skin, present on legs, hips and buttocks area. healed , scabbed over skin noted on sacral area. pt breathing even and unlabored. 20g iv insert to left ac. ekg performed. as per daughter, pts home meds include, gabapentin, propanolol 20mg before food, plavix, colace, proscar, glipizide ER, amlodopine 5 mg.

## 2019-06-21 NOTE — ED PROVIDER NOTE - CLINICAL SUMMARY MEDICAL DECISION MAKING FREE TEXT BOX
BIBEMS for cough, weakness, h.o chronic UTI, rectal temp 100.5, no chestpain, shortness of breath, diarrhea. concerning for pna vs uri, vs uti, will treat sepsis, will get trop, rvp, disp admission

## 2019-06-21 NOTE — ED PROVIDER NOTE - NS ED ROS FT
GENERAL: No weight changes, nightsweats  EYES: no change in vision  HEENT: no dysplasia, odynophagia, ear pain, rhinorrhea, epistasis   CARDIAC: no chest pain, palpitation   PULMONARY: cough no SOB  GI: no abdominal pain, no nausea or no vomiting, no diarrhea or constipation  : No changes in urination for pain/freq.   SKIN: no rashes   NEURO: headache no numbness/tingling, extremity weakness   MSK: No joint pain

## 2019-06-22 DIAGNOSIS — Z79.899 OTHER LONG TERM (CURRENT) DRUG THERAPY: ICD-10-CM

## 2019-06-22 DIAGNOSIS — A41.9 SEPSIS, UNSPECIFIED ORGANISM: ICD-10-CM

## 2019-06-22 DIAGNOSIS — N18.3 CHRONIC KIDNEY DISEASE, STAGE 3 (MODERATE): ICD-10-CM

## 2019-06-22 DIAGNOSIS — I10 ESSENTIAL (PRIMARY) HYPERTENSION: ICD-10-CM

## 2019-06-22 DIAGNOSIS — J18.9 PNEUMONIA, UNSPECIFIED ORGANISM: ICD-10-CM

## 2019-06-22 DIAGNOSIS — I48.91 UNSPECIFIED ATRIAL FIBRILLATION: ICD-10-CM

## 2019-06-22 DIAGNOSIS — E11.22 TYPE 2 DIABETES MELLITUS WITH DIABETIC CHRONIC KIDNEY DISEASE: ICD-10-CM

## 2019-06-22 LAB
ALBUMIN SERPL ELPH-MCNC: 3.3 G/DL — SIGNIFICANT CHANGE UP (ref 3.3–5)
ALP SERPL-CCNC: 51 U/L — SIGNIFICANT CHANGE UP (ref 40–120)
ALT FLD-CCNC: 8 U/L — SIGNIFICANT CHANGE UP (ref 4–41)
ANION GAP SERPL CALC-SCNC: 10 MMO/L — SIGNIFICANT CHANGE UP (ref 7–14)
AST SERPL-CCNC: 13 U/L — SIGNIFICANT CHANGE UP (ref 4–40)
BASOPHILS # BLD AUTO: 0.03 K/UL — SIGNIFICANT CHANGE UP (ref 0–0.2)
BASOPHILS NFR BLD AUTO: 0.2 % — SIGNIFICANT CHANGE UP (ref 0–2)
BILIRUB SERPL-MCNC: 0.8 MG/DL — SIGNIFICANT CHANGE UP (ref 0.2–1.2)
BUN SERPL-MCNC: 16 MG/DL — SIGNIFICANT CHANGE UP (ref 7–23)
CALCIUM SERPL-MCNC: 9.5 MG/DL — SIGNIFICANT CHANGE UP (ref 8.4–10.5)
CHLORIDE SERPL-SCNC: 104 MMOL/L — SIGNIFICANT CHANGE UP (ref 98–107)
CO2 SERPL-SCNC: 28 MMOL/L — SIGNIFICANT CHANGE UP (ref 22–31)
CREAT SERPL-MCNC: 1.36 MG/DL — HIGH (ref 0.5–1.3)
EOSINOPHIL # BLD AUTO: 0.04 K/UL — SIGNIFICANT CHANGE UP (ref 0–0.5)
EOSINOPHIL NFR BLD AUTO: 0.2 % — SIGNIFICANT CHANGE UP (ref 0–6)
GLUCOSE BLDC GLUCOMTR-MCNC: 185 MG/DL — HIGH (ref 70–99)
GLUCOSE BLDC GLUCOMTR-MCNC: 57 MG/DL — LOW (ref 70–99)
GLUCOSE BLDC GLUCOMTR-MCNC: 62 MG/DL — LOW (ref 70–99)
GLUCOSE BLDC GLUCOMTR-MCNC: 70 MG/DL — SIGNIFICANT CHANGE UP (ref 70–99)
GLUCOSE BLDC GLUCOMTR-MCNC: 76 MG/DL — SIGNIFICANT CHANGE UP (ref 70–99)
GLUCOSE BLDC GLUCOMTR-MCNC: 82 MG/DL — SIGNIFICANT CHANGE UP (ref 70–99)
GLUCOSE BLDC GLUCOMTR-MCNC: 91 MG/DL — SIGNIFICANT CHANGE UP (ref 70–99)
GLUCOSE BLDC GLUCOMTR-MCNC: 97 MG/DL — SIGNIFICANT CHANGE UP (ref 70–99)
GLUCOSE SERPL-MCNC: 105 MG/DL — HIGH (ref 70–99)
HBA1C BLD-MCNC: 6 % — HIGH (ref 4–5.6)
HCT VFR BLD CALC: 43.9 % — SIGNIFICANT CHANGE UP (ref 39–50)
HGB BLD-MCNC: 13.7 G/DL — SIGNIFICANT CHANGE UP (ref 13–17)
IMM GRANULOCYTES NFR BLD AUTO: 0.4 % — SIGNIFICANT CHANGE UP (ref 0–1.5)
LYMPHOCYTES # BLD AUTO: 0.79 K/UL — LOW (ref 1–3.3)
LYMPHOCYTES # BLD AUTO: 4.7 % — LOW (ref 13–44)
MAGNESIUM SERPL-MCNC: 1.8 MG/DL — SIGNIFICANT CHANGE UP (ref 1.6–2.6)
MCHC RBC-ENTMCNC: 28.1 PG — SIGNIFICANT CHANGE UP (ref 27–34)
MCHC RBC-ENTMCNC: 31.2 % — LOW (ref 32–36)
MCV RBC AUTO: 90 FL — SIGNIFICANT CHANGE UP (ref 80–100)
MONOCYTES # BLD AUTO: 1.32 K/UL — HIGH (ref 0–0.9)
MONOCYTES NFR BLD AUTO: 7.8 % — SIGNIFICANT CHANGE UP (ref 2–14)
NEUTROPHILS # BLD AUTO: 14.7 K/UL — HIGH (ref 1.8–7.4)
NEUTROPHILS NFR BLD AUTO: 86.7 % — HIGH (ref 43–77)
NRBC # FLD: 0 K/UL — SIGNIFICANT CHANGE UP (ref 0–0)
PHOSPHATE SERPL-MCNC: 2.7 MG/DL — SIGNIFICANT CHANGE UP (ref 2.5–4.5)
PLATELET # BLD AUTO: 128 K/UL — LOW (ref 150–400)
PMV BLD: 11.5 FL — SIGNIFICANT CHANGE UP (ref 7–13)
POTASSIUM SERPL-MCNC: 3.9 MMOL/L — SIGNIFICANT CHANGE UP (ref 3.5–5.3)
POTASSIUM SERPL-SCNC: 3.9 MMOL/L — SIGNIFICANT CHANGE UP (ref 3.5–5.3)
PROT SERPL-MCNC: 7.4 G/DL — SIGNIFICANT CHANGE UP (ref 6–8.3)
RBC # BLD: 4.88 M/UL — SIGNIFICANT CHANGE UP (ref 4.2–5.8)
RBC # FLD: 12.7 % — SIGNIFICANT CHANGE UP (ref 10.3–14.5)
SODIUM SERPL-SCNC: 142 MMOL/L — SIGNIFICANT CHANGE UP (ref 135–145)
SPECIMEN SOURCE: SIGNIFICANT CHANGE UP
SPECIMEN SOURCE: SIGNIFICANT CHANGE UP
WBC # BLD: 16.94 K/UL — HIGH (ref 3.8–10.5)
WBC # FLD AUTO: 16.94 K/UL — HIGH (ref 3.8–10.5)

## 2019-06-22 PROCEDURE — 99223 1ST HOSP IP/OBS HIGH 75: CPT | Mod: GC

## 2019-06-22 RX ORDER — DEXTROSE 50 % IN WATER 50 %
12.5 SYRINGE (ML) INTRAVENOUS ONCE
Refills: 0 | Status: DISCONTINUED | OUTPATIENT
Start: 2019-06-22 | End: 2019-07-02

## 2019-06-22 RX ORDER — DEXTROSE 50 % IN WATER 50 %
12.5 SYRINGE (ML) INTRAVENOUS ONCE
Refills: 0 | Status: COMPLETED | OUTPATIENT
Start: 2019-06-22 | End: 2019-06-22

## 2019-06-22 RX ORDER — CLOPIDOGREL BISULFATE 75 MG/1
75 TABLET, FILM COATED ORAL DAILY
Refills: 0 | Status: DISCONTINUED | OUTPATIENT
Start: 2019-06-22 | End: 2019-07-02

## 2019-06-22 RX ORDER — ACETAMINOPHEN 500 MG
650 TABLET ORAL EVERY 6 HOURS
Refills: 0 | Status: DISCONTINUED | OUTPATIENT
Start: 2019-06-22 | End: 2019-07-02

## 2019-06-22 RX ORDER — INSULIN LISPRO 100/ML
VIAL (ML) SUBCUTANEOUS
Refills: 0 | Status: DISCONTINUED | OUTPATIENT
Start: 2019-06-22 | End: 2019-07-02

## 2019-06-22 RX ORDER — GLUCAGON INJECTION, SOLUTION 0.5 MG/.1ML
1 INJECTION, SOLUTION SUBCUTANEOUS ONCE
Refills: 0 | Status: DISCONTINUED | OUTPATIENT
Start: 2019-06-22 | End: 2019-07-02

## 2019-06-22 RX ORDER — DEXTROSE 50 % IN WATER 50 %
25 SYRINGE (ML) INTRAVENOUS ONCE
Refills: 0 | Status: DISCONTINUED | OUTPATIENT
Start: 2019-06-22 | End: 2019-07-02

## 2019-06-22 RX ORDER — TIOTROPIUM BROMIDE 18 UG/1
1 CAPSULE ORAL; RESPIRATORY (INHALATION) DAILY
Refills: 0 | Status: DISCONTINUED | OUTPATIENT
Start: 2019-06-22 | End: 2019-07-02

## 2019-06-22 RX ORDER — SODIUM CHLORIDE 9 MG/ML
1000 INJECTION INTRAMUSCULAR; INTRAVENOUS; SUBCUTANEOUS
Refills: 0 | Status: DISCONTINUED | OUTPATIENT
Start: 2019-06-22 | End: 2019-06-27

## 2019-06-22 RX ORDER — INSULIN LISPRO 100/ML
VIAL (ML) SUBCUTANEOUS AT BEDTIME
Refills: 0 | Status: DISCONTINUED | OUTPATIENT
Start: 2019-06-22 | End: 2019-07-02

## 2019-06-22 RX ORDER — PROPRANOLOL HCL 160 MG
1 CAPSULE, EXTENDED RELEASE 24HR ORAL
Qty: 0 | Refills: 0 | DISCHARGE

## 2019-06-22 RX ORDER — AZITHROMYCIN 500 MG/1
500 TABLET, FILM COATED ORAL EVERY 24 HOURS
Refills: 0 | Status: DISCONTINUED | OUTPATIENT
Start: 2019-06-22 | End: 2019-06-23

## 2019-06-22 RX ORDER — DEXTROSE 50 % IN WATER 50 %
15 SYRINGE (ML) INTRAVENOUS ONCE
Refills: 0 | Status: DISCONTINUED | OUTPATIENT
Start: 2019-06-22 | End: 2019-07-02

## 2019-06-22 RX ORDER — FINASTERIDE 5 MG/1
5 TABLET, FILM COATED ORAL DAILY
Refills: 0 | Status: DISCONTINUED | OUTPATIENT
Start: 2019-06-22 | End: 2019-07-02

## 2019-06-22 RX ORDER — ONDANSETRON 8 MG/1
4 TABLET, FILM COATED ORAL EVERY 6 HOURS
Refills: 0 | Status: DISCONTINUED | OUTPATIENT
Start: 2019-06-22 | End: 2019-07-02

## 2019-06-22 RX ORDER — HEPARIN SODIUM 5000 [USP'U]/ML
5000 INJECTION INTRAVENOUS; SUBCUTANEOUS EVERY 8 HOURS
Refills: 0 | Status: DISCONTINUED | OUTPATIENT
Start: 2019-06-22 | End: 2019-07-02

## 2019-06-22 RX ORDER — SODIUM CHLORIDE 9 MG/ML
1000 INJECTION, SOLUTION INTRAVENOUS
Refills: 0 | Status: DISCONTINUED | OUTPATIENT
Start: 2019-06-22 | End: 2019-07-02

## 2019-06-22 RX ORDER — CEFTRIAXONE 500 MG/1
1000 INJECTION, POWDER, FOR SOLUTION INTRAMUSCULAR; INTRAVENOUS EVERY 24 HOURS
Refills: 0 | Status: COMPLETED | OUTPATIENT
Start: 2019-06-22 | End: 2019-06-26

## 2019-06-22 RX ORDER — ASPIRIN/CALCIUM CARB/MAGNESIUM 324 MG
81 TABLET ORAL DAILY
Refills: 0 | Status: DISCONTINUED | OUTPATIENT
Start: 2019-06-22 | End: 2019-07-02

## 2019-06-22 RX ORDER — DEXTROSE 50 % IN WATER 50 %
15 SYRINGE (ML) INTRAVENOUS ONCE
Refills: 0 | Status: COMPLETED | OUTPATIENT
Start: 2019-06-22 | End: 2019-06-22

## 2019-06-22 RX ADMIN — HEPARIN SODIUM 5000 UNIT(S): 5000 INJECTION INTRAVENOUS; SUBCUTANEOUS at 05:49

## 2019-06-22 RX ADMIN — AZITHROMYCIN 250 MILLIGRAM(S): 500 TABLET, FILM COATED ORAL at 22:53

## 2019-06-22 RX ADMIN — HEPARIN SODIUM 5000 UNIT(S): 5000 INJECTION INTRAVENOUS; SUBCUTANEOUS at 22:54

## 2019-06-22 RX ADMIN — FINASTERIDE 5 MILLIGRAM(S): 5 TABLET, FILM COATED ORAL at 11:51

## 2019-06-22 RX ADMIN — CEFTRIAXONE 100 MILLIGRAM(S): 500 INJECTION, POWDER, FOR SOLUTION INTRAMUSCULAR; INTRAVENOUS at 17:50

## 2019-06-22 RX ADMIN — CLOPIDOGREL BISULFATE 75 MILLIGRAM(S): 75 TABLET, FILM COATED ORAL at 11:50

## 2019-06-22 RX ADMIN — SODIUM CHLORIDE 75 MILLILITER(S): 9 INJECTION INTRAMUSCULAR; INTRAVENOUS; SUBCUTANEOUS at 01:28

## 2019-06-22 RX ADMIN — Medication 12.5 GRAM(S): at 22:42

## 2019-06-22 RX ADMIN — HEPARIN SODIUM 5000 UNIT(S): 5000 INJECTION INTRAVENOUS; SUBCUTANEOUS at 14:18

## 2019-06-22 RX ADMIN — TIOTROPIUM BROMIDE 1 CAPSULE(S): 18 CAPSULE ORAL; RESPIRATORY (INHALATION) at 10:30

## 2019-06-22 RX ADMIN — Medication 81 MILLIGRAM(S): at 11:50

## 2019-06-22 RX ADMIN — Medication 15 GRAM(S): at 22:00

## 2019-06-22 NOTE — PROVIDER CONTACT NOTE (OTHER) - SITUATION
Pt had an episode of hypoglycemia (FS: 57). 40% glucose gel PO administered as per protocol. repeat FS is 76 after getting 40% glucose gel PO.

## 2019-06-22 NOTE — H&P ADULT - NSHPREVIEWOFSYSTEMS_GEN_ALL_CORE
REVIEW OF SYSTEMS:    CONSTITUTIONAL: + weakness, + fevers and chills, no weight loss  EYES/ENT: No visual changes;  No dysphagia or odynophagia, no tinnitus, + nasal congestion   NECK: No pain or stiffness  RESPIRATORY: + cough, wheezing, hemoptysis; No shortness of breath  CARDIOVASCULAR: No chest pain or palpitations; No lower extremity edema  GASTROINTESTINAL: No abdominal or epigastric pain. No nausea, vomiting, or hematemesis; No diarrhea or constipation. No melena or hematochezia.  MUSCULOSKELETAL: No joint pain, swelling, erythema or warmth, no back pain  GENITOURINARY: No dysuria, frequency or hematuria, no suprapubic pain  NEUROLOGICAL: No numbness or weakness, no headache, no syncope, no gait abnormalities   SKIN: No itching, burning, rashes, or lesions   All other review of systems is negative unless indicated above.

## 2019-06-22 NOTE — H&P ADULT - PROBLEM SELECTOR PLAN 6
- Pt with reported h/o "arrhythmia"  - Suspect this is chronic A fib. Pt not on AC   - Currently rate controlled. Will obtain further history in AM

## 2019-06-22 NOTE — H&P ADULT - NSHPPHYSICALEXAM_GEN_ALL_CORE
T(C): 36.7 (06-21-19 @ 20:53), Max: 38.1 (06-21-19 @ 17:09)  HR: 85 (06-21-19 @ 20:53) (85 - 98)  BP: 132/65 (06-21-19 @ 20:53) (126/74 - 132/65)  RR: 16 (06-21-19 @ 20:53) (16 - 18)  SpO2: 100% (06-21-19 @ 20:53) (95% - 100%)    GENERAL: No acute distress, well-developed  HEAD:  Atraumatic, Normocephalic  ENT: EOMI, PERRLA, conjunctiva and sclera clear, Neck supple, No JVD, moist mucosa, no pharyngeal erythema, no tonsillar enlargement or exudate  CHEST/LUNG: Crackles noted in R lung base, No wheeze, equal breath sounds bilaterally   HEART: Regular rate and rhythm; No murmurs, rubs, or gallops  ABDOMEN: Soft, Nontender, Nondistended; Bowel sounds present, no organomegaly  EXTREMITIES:  2+ Peripheral Pulses, No clubbing, cyanosis, or edema  PSYCH: AAOx3, normal affect, normal behavior   NEUROLOGY: non-focal, cranial nerves intact  SKIN: Normal color, No rashes or lesions

## 2019-06-22 NOTE — H&P ADULT - NSICDXPASTMEDICALHX_GEN_ALL_CORE_FT
PAST MEDICAL HISTORY:  Arrhythmia, long term     Diabetes     Dislocation of hip, left, closed - repeated 3 times, last on 01/21/16    Hypertension

## 2019-06-22 NOTE — CONSULT NOTE ADULT - SUBJECTIVE AND OBJECTIVE BOX
HPI:  89  M with  HTN, DM, CKDIII, BPH, CAD, ? A fib brought in for fever, chills, sweats, generalized weakness, on further questioning he stated that he has rhinorrhea but no cough and has L flank pain but no dysuria.   he denied chest pain, abd pain, N/V/D  In ED pt was given Ceftriaxone, Azithromycin, Tylenol and 500cc NS  VS  132/65  85  Tmax 100.5  16  100% on RA (2019 00:11)      PAST MEDICAL & SURGICAL HISTORY:  Dislocation of hip, left, closed: - repeated 3 times, last on 16  Arrhythmia, long term  Diabetes  Hypertension  Hx of shoulder surgery  History of total hip replacement      Allergies    No Known Allergies    Intolerances        ANTIMICROBIALS:  azithromycin  IVPB 500 every 24 hours  cefTRIAXone   IVPB 1000 every 24 hours      OTHER MEDS:  acetaminophen   Tablet .. 650 milliGRAM(s) Oral every 6 hours PRN  aspirin enteric coated 81 milliGRAM(s) Oral daily  clopidogrel Tablet 75 milliGRAM(s) Oral daily  dextrose 40% Gel 15 Gram(s) Oral once PRN  dextrose 5%. 1000 milliLiter(s) IV Continuous <Continuous>  dextrose 50% Injectable 12.5 Gram(s) IV Push once  dextrose 50% Injectable 25 Gram(s) IV Push once  dextrose 50% Injectable 25 Gram(s) IV Push once  finasteride 5 milliGRAM(s) Oral daily  glucagon  Injectable 1 milliGRAM(s) IntraMuscular once PRN  guaiFENesin   Syrup  (Sugar-Free) 100 milliGRAM(s) Oral every 6 hours PRN  heparin  Injectable 5000 Unit(s) SubCutaneous every 8 hours  insulin lispro (HumaLOG) corrective regimen sliding scale   SubCutaneous three times a day before meals  insulin lispro (HumaLOG) corrective regimen sliding scale   SubCutaneous at bedtime  ondansetron Injectable 4 milliGRAM(s) IV Push every 6 hours PRN  sodium chloride 0.9%. 1000 milliLiter(s) IV Continuous <Continuous>  tiotropium 18 MICROgram(s) Capsule 1 Capsule(s) Inhalation daily      SOCIAL HISTORY:  , lives with wife (as per the patient)  former smoker, alcohol or drug abuse  no recent travel    FAMILY HISTORY:  Family history of hypertension (Sibling, Sibling)  Family history of diabetes mellitus (Sibling, Sibling)      ROS:    All other systems negative     Constitutional: + fever, + chills, + sweats  Eye: no eye pain, no redness, no vision changes  ENT:  no sore throat, + rhinorrhea  Cardiovascular:  no chest pain, no palpitation  Respiratory:  no SOB, no cough  GI:  no abd pain, no vomiting, no diarrhea  urinary: no dysuria, no hematuria, + L flank pain  : no  discharge or bleeding  musculoskeletal:  no joint pain, no joint swelling  skin:  no rash  neurology:  no headache, no seizure  psych: no anxiety    Physical Exam:    General:    NAD, non toxic  Head: atraumatic, normocephalic  Eyes: normal sclera and conjunctiva  ENT:   no oropharyngeal lesions, no LAD, neck supple  Cardio:    regular S1,S2, no murmur  Respiratory:   clear b/l, no wheezing  abd:   soft, BS +, not tender, no hepatosplenomegaly  :     + L CVAT, no suprapubic tenderness, no weeks  Musculoskeletal : no joint swelling, no edema  Skin:    no rash  vascular: no phlebitis, normal pulses  Neurologic:     no focal deficits  psych: normal affect      Drug Dosing Weight  Height (cm): 185.42 (2019 00:25)  Weight (kg): 75.8 (2019 00:25)  BMI (kg/m2): 22 (2019 00:25)  BSA (m2): 1.99 (2019 00:25)    Vital Signs Last 24 Hrs  T(F): 98.4 (19 @ 14:27), Max: 100.5 (19 @ 17:09)    Vital Signs Last 24 Hrs  HR: 79 (19 @ 14:27) (79 - 96)  BP: 117/72 (19 @ 14:27) (117/72 - 145/93)  RR: 18 (19 @ 14:27)  SpO2: 97% (19 @ 14:27) (95% - 100%)  Wt(kg): --                          13.7   16.94 )-----------( 128      ( 2019 07:00 )             43.9           142  |  104  |  16  ----------------------------<  105<H>  3.9   |  28  |  1.36<H>    Ca    9.5      2019 07:00  Phos  2.7       Mg     1.8         TPro  7.4  /  Alb  3.3  /  TBili  0.8  /  DBili  x   /  AST  13  /  ALT  8   /  AlkPhos  51        Urinalysis Basic - ( 2019 19:38 )    Color: YELLOW / Appearance: CLEAR / S.013 / pH: 6.0  Gluc: NEGATIVE / Ketone: NEGATIVE  / Bili: NEGATIVE / Urobili: NORMAL   Blood: SMALL / Protein: 20 / Nitrite: NEGATIVE   Leuk Esterase: SMALL / RBC: 0-2 / WBC 6-10   Sq Epi: OCC / Non Sq Epi: x / Bacteria: NEGATIVE        MICROBIOLOGY:  v              RADIOLOGY:    Images below reviewed personally  < from: Xray Chest 1 View- PORTABLE-Urgent (19 @ 17:14) >  IMPRESSION:  No focal patchy airspace dislocations, pleural effusions, pneumothorax,   or pulmonary edema.     Stable slightly prominent appearing cardiac and mediastinal silhouettes.     Trachea midline.    Spinal degenerative changes with broad dextrocurvature. Left shoulder   degenerative change again noted. HPI:  89  M with  HTN, DM, CKDIII, BPH, CAD, ? A fib brought in for fever, chills, sweats, generalized weakness, on further questioning he stated that he has rhinorrhea and ?cough and has occasional back pain but no dysuria.   he denied chest pain, abd pain, N/V/D  In ED pt was given Ceftriaxone, Azithromycin, Tylenol and 500cc NS  VS  132/65  85  Tmax 100.5  16  100% on RA (2019 00:11)      PAST MEDICAL & SURGICAL HISTORY:  Dislocation of hip, left, closed: - repeated 3 times, last on 16  Arrhythmia, long term  Diabetes  Hypertension  Hx of shoulder surgery  History of total hip replacement      Allergies    No Known Allergies    Intolerances        ANTIMICROBIALS:  azithromycin  IVPB 500 every 24 hours  cefTRIAXone   IVPB 1000 every 24 hours      OTHER MEDS:  acetaminophen   Tablet .. 650 milliGRAM(s) Oral every 6 hours PRN  aspirin enteric coated 81 milliGRAM(s) Oral daily  clopidogrel Tablet 75 milliGRAM(s) Oral daily  dextrose 40% Gel 15 Gram(s) Oral once PRN  dextrose 5%. 1000 milliLiter(s) IV Continuous <Continuous>  dextrose 50% Injectable 12.5 Gram(s) IV Push once  dextrose 50% Injectable 25 Gram(s) IV Push once  dextrose 50% Injectable 25 Gram(s) IV Push once  finasteride 5 milliGRAM(s) Oral daily  glucagon  Injectable 1 milliGRAM(s) IntraMuscular once PRN  guaiFENesin   Syrup  (Sugar-Free) 100 milliGRAM(s) Oral every 6 hours PRN  heparin  Injectable 5000 Unit(s) SubCutaneous every 8 hours  insulin lispro (HumaLOG) corrective regimen sliding scale   SubCutaneous three times a day before meals  insulin lispro (HumaLOG) corrective regimen sliding scale   SubCutaneous at bedtime  ondansetron Injectable 4 milliGRAM(s) IV Push every 6 hours PRN  sodium chloride 0.9%. 1000 milliLiter(s) IV Continuous <Continuous>  tiotropium 18 MICROgram(s) Capsule 1 Capsule(s) Inhalation daily      SOCIAL HISTORY:  , lives with wife (as per the patient)  former smoker, alcohol or drug abuse  no recent travel    FAMILY HISTORY:  Family history of hypertension (Sibling, Sibling)  Family history of diabetes mellitus (Sibling, Sibling)      ROS:    All other systems negative     Constitutional: + fever, + chills, + sweats  Eye: no eye pain, no redness, no vision changes  ENT:  no sore throat, + rhinorrhea  Cardiovascular:  no chest pain, no palpitation  Respiratory:  no SOB, no cough  GI:  no abd pain, no vomiting, no diarrhea  urinary: no dysuria, no hematuria, + L flank pain  : no  discharge or bleeding  musculoskeletal:  no joint pain, no joint swelling  skin:  no rash  neurology:  no headache, no seizure  psych: no anxiety    Physical Exam:    General:    NAD, non toxic  Head: atraumatic, normocephalic  Eyes: normal sclera and conjunctiva  ENT:   no oropharyngeal lesions, no LAD, neck supple  Cardio:    regular S1,S2, no murmur  Respiratory:   clear b/l, no wheezing  abd:   soft, BS +, not tender, no hepatosplenomegaly  :     + L CVAT, no suprapubic tenderness, no weeks  Musculoskeletal : no joint swelling, no edema  Skin:    no rash  vascular: no phlebitis, normal pulses  Neurologic:     no focal deficits  psych: normal affect      Drug Dosing Weight  Height (cm): 185.42 (2019 00:25)  Weight (kg): 75.8 (2019 00:25)  BMI (kg/m2): 22 (2019 00:25)  BSA (m2): 1.99 (2019 00:25)    Vital Signs Last 24 Hrs  T(F): 98.4 (19 @ 14:27), Max: 100.5 (19 @ 17:09)    Vital Signs Last 24 Hrs  HR: 79 (19 @ 14:27) (79 - 96)  BP: 117/72 (19 @ 14:27) (117/72 - 145/93)  RR: 18 (19 @ 14:27)  SpO2: 97% (19 @ 14:27) (95% - 100%)  Wt(kg): --                          13.7   16.94 )-----------( 128      ( 2019 07:00 )             43.9           142  |  104  |  16  ----------------------------<  105<H>  3.9   |  28  |  1.36<H>    Ca    9.5      2019 07:00  Phos  2.7       Mg     1.8         TPro  7.4  /  Alb  3.3  /  TBili  0.8  /  DBili  x   /  AST  13  /  ALT  8   /  AlkPhos  51        Urinalysis Basic - ( 2019 19:38 )    Color: YELLOW / Appearance: CLEAR / S.013 / pH: 6.0  Gluc: NEGATIVE / Ketone: NEGATIVE  / Bili: NEGATIVE / Urobili: NORMAL   Blood: SMALL / Protein: 20 / Nitrite: NEGATIVE   Leuk Esterase: SMALL / RBC: 0-2 / WBC 6-10   Sq Epi: OCC / Non Sq Epi: x / Bacteria: NEGATIVE        MICROBIOLOGY:  v              RADIOLOGY:    Images below reviewed personally  < from: Xray Chest 1 View- PORTABLE-Urgent (19 @ 17:14) >  IMPRESSION:  No focal patchy airspace dislocations, pleural effusions, pneumothorax,   or pulmonary edema.     Stable slightly prominent appearing cardiac and mediastinal silhouettes.     Trachea midline.    Spinal degenerative changes with broad dextrocurvature. Left shoulder   degenerative change again noted.

## 2019-06-22 NOTE — H&P ADULT - PROBLEM SELECTOR PLAN 1
- Pt presenting with cough, crackles noted on exam, septic. Suspect underlying PNA, though CXR neg  - RVP neg  - Will continue ceftriaxone and azithromycin for now

## 2019-06-22 NOTE — H&P ADULT - NSICDXFAMILYHX_GEN_ALL_CORE_FT
FAMILY HISTORY:  Sibling  Still living? Unknown  Family history of diabetes mellitus, Age at diagnosis: Age Unknown  Family history of hypertension, Age at diagnosis: Age Unknown  Family history of diabetes mellitus, Age at diagnosis: Age Unknown  Family history of hypertension, Age at diagnosis: Age Unknown

## 2019-06-22 NOTE — H&P ADULT - PROBLEM SELECTOR PLAN 2
- Suspect secondary to PNA vs less likely UTI. Pt denies any urinary symptoms. UA equivocal. However, antibiotics will cover both sources  - Will continue Ceftriaxone + Azithromycin for now  - f/u blood cultures and urine culture  - Gentle IVFs   - Lactacte <2

## 2019-06-22 NOTE — PATIENT PROFILE ADULT - NSPROMEDSPUMP_GEN_A_NUR
pt c/o rash to underneath bilat breast since last night. denies any fever.  no other complaints at this time none

## 2019-06-22 NOTE — H&P ADULT - HISTORY OF PRESENT ILLNESS
89 yo M with h/o HTN, DM, CKDIII, BPH, CAD, ? A fib presenting with cough  and weakness. Pt states that he has had a productive cough for the past two weeks. He has also had generalized weakness and felt lightheaded today. He denies any subjective fevers or chills. No shortness of breath, no chest pain or palpitations. No known sick contacts. Pt states that his stepdaughter and son brought him to the hospital. Also of note, pt recently treated for UTI.     In ED pt was given Ceftriaxone, Azithromycin, Tylenol and 500cc NS  VS  132/65  85  Tmax 100.5  16  100% on RA

## 2019-06-22 NOTE — H&P ADULT - NSHPLABSRESULTS_GEN_ALL_CORE
.  LABS:                         15.6   14.93 )-----------( 141      ( 2019 16:55 )             51.1         138  |  99  |  18  ----------------------------<  99  4.2   |  28  |  1.46<H>    Ca    10.0      2019 16:55  Phos  2.8       Mg     1.9         TPro  8.9<H>  /  Alb  4.2  /  TBili  0.9  /  DBili  x   /  AST  22  /  ALT  11  /  AlkPhos  60      PT/INR - ( 2019 17:39 )   PT: 13.2 SEC;   INR: 1.18          PTT - ( 2019 17:39 )  PTT:27.2 SEC  Urinalysis Basic - ( 2019 19:38 )    Color: YELLOW / Appearance: CLEAR / S.013 / pH: 6.0  Gluc: NEGATIVE / Ketone: NEGATIVE  / Bili: NEGATIVE / Urobili: NORMAL   Blood: SMALL / Protein: 20 / Nitrite: NEGATIVE   Leuk Esterase: SMALL / RBC: 0-2 / WBC 6-10   Sq Epi: OCC / Non Sq Epi: x / Bacteria: NEGATIVE          Serum Pro-Brain Natriuretic Peptide: 1659 pg/mL ( @ 16:55)        RADIOLOGY, EKG & ADDITIONAL TESTS: Reviewed.

## 2019-06-23 LAB
ANION GAP SERPL CALC-SCNC: 13 MMO/L — SIGNIFICANT CHANGE UP (ref 7–14)
BACTERIA UR CULT: SIGNIFICANT CHANGE UP
BUN SERPL-MCNC: 16 MG/DL — SIGNIFICANT CHANGE UP (ref 7–23)
CALCIUM SERPL-MCNC: 9.6 MG/DL — SIGNIFICANT CHANGE UP (ref 8.4–10.5)
CHLORIDE SERPL-SCNC: 97 MMOL/L — LOW (ref 98–107)
CO2 SERPL-SCNC: 29 MMOL/L — SIGNIFICANT CHANGE UP (ref 22–31)
CREAT SERPL-MCNC: 1.37 MG/DL — HIGH (ref 0.5–1.3)
GLUCOSE BLDC GLUCOMTR-MCNC: 100 MG/DL — HIGH (ref 70–99)
GLUCOSE BLDC GLUCOMTR-MCNC: 102 MG/DL — HIGH (ref 70–99)
GLUCOSE BLDC GLUCOMTR-MCNC: 114 MG/DL — HIGH (ref 70–99)
GLUCOSE BLDC GLUCOMTR-MCNC: 127 MG/DL — HIGH (ref 70–99)
GLUCOSE BLDC GLUCOMTR-MCNC: 57 MG/DL — LOW (ref 70–99)
GLUCOSE BLDC GLUCOMTR-MCNC: 66 MG/DL — LOW (ref 70–99)
GLUCOSE BLDC GLUCOMTR-MCNC: 73 MG/DL — SIGNIFICANT CHANGE UP (ref 70–99)
GLUCOSE BLDC GLUCOMTR-MCNC: 80 MG/DL — SIGNIFICANT CHANGE UP (ref 70–99)
GLUCOSE BLDC GLUCOMTR-MCNC: 89 MG/DL — SIGNIFICANT CHANGE UP (ref 70–99)
GLUCOSE SERPL-MCNC: 102 MG/DL — HIGH (ref 70–99)
HCT VFR BLD CALC: 49 % — SIGNIFICANT CHANGE UP (ref 39–50)
HGB BLD-MCNC: 15.4 G/DL — SIGNIFICANT CHANGE UP (ref 13–17)
MAGNESIUM SERPL-MCNC: 1.8 MG/DL — SIGNIFICANT CHANGE UP (ref 1.6–2.6)
MCHC RBC-ENTMCNC: 28.1 PG — SIGNIFICANT CHANGE UP (ref 27–34)
MCHC RBC-ENTMCNC: 31.4 % — LOW (ref 32–36)
MCV RBC AUTO: 89.4 FL — SIGNIFICANT CHANGE UP (ref 80–100)
NRBC # FLD: 0 K/UL — SIGNIFICANT CHANGE UP (ref 0–0)
PHOSPHATE SERPL-MCNC: 2.4 MG/DL — LOW (ref 2.5–4.5)
PLATELET # BLD AUTO: 140 K/UL — LOW (ref 150–400)
PMV BLD: 11.8 FL — SIGNIFICANT CHANGE UP (ref 7–13)
POTASSIUM SERPL-MCNC: 3.9 MMOL/L — SIGNIFICANT CHANGE UP (ref 3.5–5.3)
POTASSIUM SERPL-SCNC: 3.9 MMOL/L — SIGNIFICANT CHANGE UP (ref 3.5–5.3)
RBC # BLD: 5.48 M/UL — SIGNIFICANT CHANGE UP (ref 4.2–5.8)
RBC # FLD: 12.7 % — SIGNIFICANT CHANGE UP (ref 10.3–14.5)
SODIUM SERPL-SCNC: 139 MMOL/L — SIGNIFICANT CHANGE UP (ref 135–145)
SPECIMEN SOURCE: SIGNIFICANT CHANGE UP
WBC # BLD: 11.02 K/UL — HIGH (ref 3.8–10.5)
WBC # FLD AUTO: 11.02 K/UL — HIGH (ref 3.8–10.5)

## 2019-06-23 RX ORDER — DEXTROSE 50 % IN WATER 50 %
15 SYRINGE (ML) INTRAVENOUS ONCE
Refills: 0 | Status: COMPLETED | OUTPATIENT
Start: 2019-06-23 | End: 2019-06-23

## 2019-06-23 RX ADMIN — CLOPIDOGREL BISULFATE 75 MILLIGRAM(S): 75 TABLET, FILM COATED ORAL at 11:13

## 2019-06-23 RX ADMIN — Medication 81 MILLIGRAM(S): at 11:13

## 2019-06-23 RX ADMIN — TIOTROPIUM BROMIDE 1 CAPSULE(S): 18 CAPSULE ORAL; RESPIRATORY (INHALATION) at 10:45

## 2019-06-23 RX ADMIN — HEPARIN SODIUM 5000 UNIT(S): 5000 INJECTION INTRAVENOUS; SUBCUTANEOUS at 05:20

## 2019-06-23 RX ADMIN — FINASTERIDE 5 MILLIGRAM(S): 5 TABLET, FILM COATED ORAL at 11:13

## 2019-06-23 RX ADMIN — CEFTRIAXONE 100 MILLIGRAM(S): 500 INJECTION, POWDER, FOR SOLUTION INTRAMUSCULAR; INTRAVENOUS at 17:41

## 2019-06-23 RX ADMIN — HEPARIN SODIUM 5000 UNIT(S): 5000 INJECTION INTRAVENOUS; SUBCUTANEOUS at 21:56

## 2019-06-23 RX ADMIN — AZITHROMYCIN 250 MILLIGRAM(S): 500 TABLET, FILM COATED ORAL at 23:26

## 2019-06-23 RX ADMIN — Medication 15 GRAM(S): at 07:48

## 2019-06-23 RX ADMIN — HEPARIN SODIUM 5000 UNIT(S): 5000 INJECTION INTRAVENOUS; SUBCUTANEOUS at 13:03

## 2019-06-23 NOTE — PROVIDER CONTACT NOTE (OTHER) - RECOMMENDATIONS
called provider to confirm if patient needs to be on these orders, patient put in for transport to go to 8N, will continue to monitor

## 2019-06-24 LAB
ANION GAP SERPL CALC-SCNC: 10 MMO/L — SIGNIFICANT CHANGE UP (ref 7–14)
BUN SERPL-MCNC: 12 MG/DL — SIGNIFICANT CHANGE UP (ref 7–23)
CALCIUM SERPL-MCNC: 9 MG/DL — SIGNIFICANT CHANGE UP (ref 8.4–10.5)
CHLORIDE SERPL-SCNC: 100 MMOL/L — SIGNIFICANT CHANGE UP (ref 98–107)
CO2 SERPL-SCNC: 29 MMOL/L — SIGNIFICANT CHANGE UP (ref 22–31)
CREAT SERPL-MCNC: 1.24 MG/DL — SIGNIFICANT CHANGE UP (ref 0.5–1.3)
GLUCOSE BLDC GLUCOMTR-MCNC: 121 MG/DL — HIGH (ref 70–99)
GLUCOSE BLDC GLUCOMTR-MCNC: 122 MG/DL — HIGH (ref 70–99)
GLUCOSE BLDC GLUCOMTR-MCNC: 95 MG/DL — SIGNIFICANT CHANGE UP (ref 70–99)
GLUCOSE BLDC GLUCOMTR-MCNC: 96 MG/DL — SIGNIFICANT CHANGE UP (ref 70–99)
GLUCOSE SERPL-MCNC: 110 MG/DL — HIGH (ref 70–99)
HCT VFR BLD CALC: 46.3 % — SIGNIFICANT CHANGE UP (ref 39–50)
HGB BLD-MCNC: 14.7 G/DL — SIGNIFICANT CHANGE UP (ref 13–17)
MAGNESIUM SERPL-MCNC: 1.9 MG/DL — SIGNIFICANT CHANGE UP (ref 1.6–2.6)
MCHC RBC-ENTMCNC: 28.7 PG — SIGNIFICANT CHANGE UP (ref 27–34)
MCHC RBC-ENTMCNC: 31.7 % — LOW (ref 32–36)
MCV RBC AUTO: 90.4 FL — SIGNIFICANT CHANGE UP (ref 80–100)
NRBC # FLD: 0 K/UL — SIGNIFICANT CHANGE UP (ref 0–0)
PHOSPHATE SERPL-MCNC: 2.6 MG/DL — SIGNIFICANT CHANGE UP (ref 2.5–4.5)
PLATELET # BLD AUTO: 138 K/UL — LOW (ref 150–400)
PMV BLD: 11.9 FL — SIGNIFICANT CHANGE UP (ref 7–13)
POTASSIUM SERPL-MCNC: 3.4 MMOL/L — LOW (ref 3.5–5.3)
POTASSIUM SERPL-SCNC: 3.4 MMOL/L — LOW (ref 3.5–5.3)
RBC # BLD: 5.12 M/UL — SIGNIFICANT CHANGE UP (ref 4.2–5.8)
RBC # FLD: 12.6 % — SIGNIFICANT CHANGE UP (ref 10.3–14.5)
SODIUM SERPL-SCNC: 139 MMOL/L — SIGNIFICANT CHANGE UP (ref 135–145)
WBC # BLD: 9.31 K/UL — SIGNIFICANT CHANGE UP (ref 3.8–10.5)
WBC # FLD AUTO: 9.31 K/UL — SIGNIFICANT CHANGE UP (ref 3.8–10.5)

## 2019-06-24 PROCEDURE — 99232 SBSQ HOSP IP/OBS MODERATE 35: CPT

## 2019-06-24 RX ORDER — POTASSIUM CHLORIDE 20 MEQ
20 PACKET (EA) ORAL ONCE
Refills: 0 | Status: COMPLETED | OUTPATIENT
Start: 2019-06-24 | End: 2019-06-24

## 2019-06-24 RX ADMIN — CEFTRIAXONE 100 MILLIGRAM(S): 500 INJECTION, POWDER, FOR SOLUTION INTRAMUSCULAR; INTRAVENOUS at 17:41

## 2019-06-24 RX ADMIN — Medication 81 MILLIGRAM(S): at 13:01

## 2019-06-24 RX ADMIN — TIOTROPIUM BROMIDE 1 CAPSULE(S): 18 CAPSULE ORAL; RESPIRATORY (INHALATION) at 13:01

## 2019-06-24 RX ADMIN — Medication 650 MILLIGRAM(S): at 22:21

## 2019-06-24 RX ADMIN — CLOPIDOGREL BISULFATE 75 MILLIGRAM(S): 75 TABLET, FILM COATED ORAL at 13:01

## 2019-06-24 RX ADMIN — HEPARIN SODIUM 5000 UNIT(S): 5000 INJECTION INTRAVENOUS; SUBCUTANEOUS at 13:02

## 2019-06-24 RX ADMIN — Medication 650 MILLIGRAM(S): at 07:13

## 2019-06-24 RX ADMIN — Medication 650 MILLIGRAM(S): at 20:57

## 2019-06-24 RX ADMIN — HEPARIN SODIUM 5000 UNIT(S): 5000 INJECTION INTRAVENOUS; SUBCUTANEOUS at 07:13

## 2019-06-24 RX ADMIN — HEPARIN SODIUM 5000 UNIT(S): 5000 INJECTION INTRAVENOUS; SUBCUTANEOUS at 21:00

## 2019-06-24 RX ADMIN — FINASTERIDE 5 MILLIGRAM(S): 5 TABLET, FILM COATED ORAL at 13:01

## 2019-06-24 RX ADMIN — Medication 650 MILLIGRAM(S): at 08:00

## 2019-06-24 RX ADMIN — Medication 20 MILLIEQUIVALENT(S): at 10:01

## 2019-06-24 NOTE — PROVIDER CONTACT NOTE (OTHER) - ACTION/TREATMENT ORDERED:
NP Mounika Boykin notified and ordered to continue to monitor. 650mg Tylenol PO administered. Will reassess.
As per ADS; activate 12.5 g 50% dextrose order and administer it. repeat FS.
provider Sow states she will look through chart, states she is aware and okay patient is not on tele at this time,patient is being transferred to 8N which is not a tele floor, will continue to monitor

## 2019-06-24 NOTE — PROVIDER CONTACT NOTE (OTHER) - ASSESSMENT
Pt warm to touch.
Pt is A&Ox4. denies feeling dizzy. No sweat or shakiness observed.
patient lying comfortably in bed, VS stable as per flowsheet, no acute distress noted, no s/s of chest pain, SOB, or pain noted

## 2019-06-24 NOTE — PROGRESS NOTE ADULT - ASSESSMENT
89  M with  HTN, DM, CKDIII, BPH, CAD, ? A fib brought in for fever, chills, sweats, generalized weakness, on further questioning he stated that he has rhinorrhea but no cough and has L flank pain but no dysuria.   febrile to 100.5, tachy to 98 exam with L CVAT  WBC: 16.9, RVP negative  u/a just 6-10 WBC  CXR no consolidation    * fever, leukocytosis, sepsis likely URI vs pneumonia  blood and urine cx negative, WBC normalized    * c/w ceftriaxone 1 g qd, started 6/21, now day 4, will likely do a 5 day course if no acute events  * if fever or worsening status will need chest/abd/pelvis CT  * monitor WBC and renal function 89  M with  HTN, DM, CKDIII, BPH, CAD, ? A fib brought in for fever, chills, sweats, generalized weakness, on further questioning he stated that he has rhinorrhea with some cough and occasional back pain but no dysuria.   febrile to 100.5, tachy to 98 exam with L CVAT  WBC: 16.9, RVP negative  u/a just 6-10 WBC  CXR no consolidation    * fever, leukocytosis, sepsis likely URI vs pneumonia  blood and urine cx negative, WBC normalized    * c/w ceftriaxone 1 g qd, started 6/21, now day 4, will likely do a 5 day course if no acute events  * if fever or worsening status will need chest/abd/pelvis CT  * monitor WBC and renal function

## 2019-06-25 LAB
ANION GAP SERPL CALC-SCNC: 11 MMO/L — SIGNIFICANT CHANGE UP (ref 7–14)
BUN SERPL-MCNC: 13 MG/DL — SIGNIFICANT CHANGE UP (ref 7–23)
CALCIUM SERPL-MCNC: 9.3 MG/DL — SIGNIFICANT CHANGE UP (ref 8.4–10.5)
CHLORIDE SERPL-SCNC: 101 MMOL/L — SIGNIFICANT CHANGE UP (ref 98–107)
CO2 SERPL-SCNC: 26 MMOL/L — SIGNIFICANT CHANGE UP (ref 22–31)
CREAT SERPL-MCNC: 1.21 MG/DL — SIGNIFICANT CHANGE UP (ref 0.5–1.3)
CRP SERPL-MCNC: 193.2 MG/L — HIGH
ERYTHROCYTE [SEDIMENTATION RATE] IN BLOOD: 67 MM/HR — HIGH (ref 1–15)
GLUCOSE BLDC GLUCOMTR-MCNC: 101 MG/DL — HIGH (ref 70–99)
GLUCOSE BLDC GLUCOMTR-MCNC: 108 MG/DL — HIGH (ref 70–99)
GLUCOSE BLDC GLUCOMTR-MCNC: 114 MG/DL — HIGH (ref 70–99)
GLUCOSE BLDC GLUCOMTR-MCNC: 122 MG/DL — HIGH (ref 70–99)
GLUCOSE SERPL-MCNC: 113 MG/DL — HIGH (ref 70–99)
HCT VFR BLD CALC: 43.9 % — SIGNIFICANT CHANGE UP (ref 39–50)
HGB BLD-MCNC: 13.9 G/DL — SIGNIFICANT CHANGE UP (ref 13–17)
MAGNESIUM SERPL-MCNC: 1.8 MG/DL — SIGNIFICANT CHANGE UP (ref 1.6–2.6)
MCHC RBC-ENTMCNC: 28.1 PG — SIGNIFICANT CHANGE UP (ref 27–34)
MCHC RBC-ENTMCNC: 31.7 % — LOW (ref 32–36)
MCV RBC AUTO: 88.7 FL — SIGNIFICANT CHANGE UP (ref 80–100)
NRBC # FLD: 0 K/UL — SIGNIFICANT CHANGE UP (ref 0–0)
PHOSPHATE SERPL-MCNC: 2.9 MG/DL — SIGNIFICANT CHANGE UP (ref 2.5–4.5)
PLATELET # BLD AUTO: 147 K/UL — LOW (ref 150–400)
PMV BLD: 11.8 FL — SIGNIFICANT CHANGE UP (ref 7–13)
POTASSIUM SERPL-MCNC: 4 MMOL/L — SIGNIFICANT CHANGE UP (ref 3.5–5.3)
POTASSIUM SERPL-SCNC: 4 MMOL/L — SIGNIFICANT CHANGE UP (ref 3.5–5.3)
RBC # BLD: 4.95 M/UL — SIGNIFICANT CHANGE UP (ref 4.2–5.8)
RBC # FLD: 12.7 % — SIGNIFICANT CHANGE UP (ref 10.3–14.5)
SODIUM SERPL-SCNC: 138 MMOL/L — SIGNIFICANT CHANGE UP (ref 135–145)
WBC # BLD: 9.07 K/UL — SIGNIFICANT CHANGE UP (ref 3.8–10.5)
WBC # FLD AUTO: 9.07 K/UL — SIGNIFICANT CHANGE UP (ref 3.8–10.5)

## 2019-06-25 PROCEDURE — 73030 X-RAY EXAM OF SHOULDER: CPT | Mod: 26,LT

## 2019-06-25 PROCEDURE — 99233 SBSQ HOSP IP/OBS HIGH 50: CPT

## 2019-06-25 PROCEDURE — 99232 SBSQ HOSP IP/OBS MODERATE 35: CPT

## 2019-06-25 RX ORDER — POLYETHYLENE GLYCOL 3350 17 G/17G
17 POWDER, FOR SOLUTION ORAL ONCE
Refills: 0 | Status: COMPLETED | OUTPATIENT
Start: 2019-06-25 | End: 2019-06-25

## 2019-06-25 RX ORDER — DOCUSATE SODIUM 100 MG
100 CAPSULE ORAL
Refills: 0 | Status: DISCONTINUED | OUTPATIENT
Start: 2019-06-25 | End: 2019-07-02

## 2019-06-25 RX ADMIN — TIOTROPIUM BROMIDE 1 CAPSULE(S): 18 CAPSULE ORAL; RESPIRATORY (INHALATION) at 11:20

## 2019-06-25 RX ADMIN — Medication 100 MILLIGRAM(S): at 17:30

## 2019-06-25 RX ADMIN — CLOPIDOGREL BISULFATE 75 MILLIGRAM(S): 75 TABLET, FILM COATED ORAL at 11:20

## 2019-06-25 RX ADMIN — HEPARIN SODIUM 5000 UNIT(S): 5000 INJECTION INTRAVENOUS; SUBCUTANEOUS at 07:25

## 2019-06-25 RX ADMIN — Medication 81 MILLIGRAM(S): at 11:20

## 2019-06-25 RX ADMIN — Medication 650 MILLIGRAM(S): at 18:55

## 2019-06-25 RX ADMIN — CEFTRIAXONE 100 MILLIGRAM(S): 500 INJECTION, POWDER, FOR SOLUTION INTRAMUSCULAR; INTRAVENOUS at 17:30

## 2019-06-25 RX ADMIN — HEPARIN SODIUM 5000 UNIT(S): 5000 INJECTION INTRAVENOUS; SUBCUTANEOUS at 13:30

## 2019-06-25 RX ADMIN — HEPARIN SODIUM 5000 UNIT(S): 5000 INJECTION INTRAVENOUS; SUBCUTANEOUS at 22:25

## 2019-06-25 RX ADMIN — Medication 650 MILLIGRAM(S): at 19:50

## 2019-06-25 RX ADMIN — POLYETHYLENE GLYCOL 3350 17 GRAM(S): 17 POWDER, FOR SOLUTION ORAL at 17:30

## 2019-06-25 RX ADMIN — FINASTERIDE 5 MILLIGRAM(S): 5 TABLET, FILM COATED ORAL at 11:20

## 2019-06-25 NOTE — PHYSICAL THERAPY INITIAL EVALUATION ADULT - PERTINENT HX OF CURRENT PROBLEM, REHAB EVAL
89 y/o Male with h/o HTN, DM, CKDIII, BPH, CAD, ? A fib presenting with cough  and weakness. Found to have PNA and sepsis. Pt is also c/o Left shoulder pain, xray negative for fx, is awaiting MRI.

## 2019-06-25 NOTE — PHYSICAL THERAPY INITIAL EVALUATION ADULT - ASR EQUIP NEEDS DISCH PT EVAL
rolling walker (5 inch wheels)
[email or web address]

## 2019-06-25 NOTE — PHYSICAL THERAPY INITIAL EVALUATION ADULT - MANUAL MUSCLE TESTING RESULTS, REHAB EVAL
Right UE at least 3/5 , bilateral LE's at least 2+ to 3-/5 in supine, quads 3/5 ; Left UE proximal at least 2/5 limited by pain, Left distal UE at least 3-/5

## 2019-06-25 NOTE — PROGRESS NOTE ADULT - ASSESSMENT
89  M with  HTN, DM, CKDIII, BPH, CAD, ? A fib brought in for fever, chills, sweats, generalized weakness, on further questioning he stated that he has rhinorrhea with some cough and has occasional back pain but no dysuria.   febrile to 100.5, tachy to 98 exam with L CVAT  WBC: 16.9, RVP negative  u/a just 6-10 WBC  CXR no consolidation    * fever, leukocytosis, sepsis likely URI vs pneumonia  blood and urine cx negative, WBC normalized but again spiked to 100.4 after 4 days of antibiotics with a L shoulder pain, the cough has improved and pt has no other focal symptoms    * c/w ceftriaxone 1 g qd, started 6/21, now day 5  * repeat blood cultures  * L shoulder xray if unremarkable, may need MRI and ortho eval

## 2019-06-25 NOTE — CHART NOTE - NSCHARTNOTEFT_GEN_A_CORE
Pt w/ atraumatic left shoulder pain w/ elevated ESR and CRP- Xray done, awaiting official read.  Ortho c/s- Spoke w/ DIMPLE De Los Santos.

## 2019-06-25 NOTE — PHYSICAL THERAPY INITIAL EVALUATION ADULT - GENERAL OBSERVATIONS, REHAB EVAL
Ok for PT evaluation per NEMO Dewitt. Pt seen lying in bed, hard of hearing, c/o Left shoulder pain, + TTP Left anterior shoulder. Pt agreed to PT evaluation.

## 2019-06-25 NOTE — PHYSICAL THERAPY INITIAL EVALUATION ADULT - RANGE OF MOTION EXAMINATION, REHAB EVAL
bilateral lower extremity ROM was WFL (within functional limits)/Right UE ROM was WFL (within functional limits)/Left shoulder active assistive flexion ROM to ~ 60 degrees, limited by pain

## 2019-06-25 NOTE — CONSULT NOTE ADULT - ATTENDING COMMENTS
Patient seen.  Clinically not septic shoulder.  If continued pain, recommend MRI left shoulder to evaluate further.

## 2019-06-25 NOTE — CONSULT NOTE ADULT - SUBJECTIVE AND OBJECTIVE BOX
Orthopaedic Surgery Consult Note    Patient is a 89y old  Male who presents with a chief complaint of PNA (25 Jun 2019 13:16)    HPI:  89 yo M with h/o HTN, DM, CKDIII, BPH, CAD, ? A fib presenting with cough  and weakness, being treated with ceftriaxone for pneumonia Orthopaedics consulted to r/o L septic shoulder in setting of intermittent spiking fevers to 100.4F and L shoulder pain. Patient states he has had L shoulder pain for 1-2 months. Denies any recent trauma. Endorses progressive stiffness of L shoulder. Denies any numbness/tingling of LUE. No other bone/joint complaints.     In ED pt was given Ceftriaxone, Azithromycin, Tylenol and 500cc NS  VS  132/65  85  Tmax 100.5  16  100% on RA (22 Jun 2019 00:11)      PAST MEDICAL & SURGICAL HISTORY:  Dislocation of hip, left, closed: - repeated 3 times, last on 01/21/16  Arrhythmia, long term  Diabetes  Hypertension  Hx of shoulder surgery  History of total hip replacement    [] No significant past history as reviewed with the patient and family    MEDICATIONS  (STANDING):  aspirin enteric coated 81 milliGRAM(s) Oral daily  cefTRIAXone   IVPB 1000 milliGRAM(s) IV Intermittent every 24 hours  clopidogrel Tablet 75 milliGRAM(s) Oral daily  dextrose 5%. 1000 milliLiter(s) (50 mL/Hr) IV Continuous <Continuous>  dextrose 50% Injectable 12.5 Gram(s) IV Push once  dextrose 50% Injectable 25 Gram(s) IV Push once  dextrose 50% Injectable 25 Gram(s) IV Push once  docusate sodium 100 milliGRAM(s) Oral two times a day  finasteride 5 milliGRAM(s) Oral daily  heparin  Injectable 5000 Unit(s) SubCutaneous every 8 hours  insulin lispro (HumaLOG) corrective regimen sliding scale   SubCutaneous three times a day before meals  insulin lispro (HumaLOG) corrective regimen sliding scale   SubCutaneous at bedtime  sodium chloride 0.9%. 1000 milliLiter(s) (75 mL/Hr) IV Continuous <Continuous>  tiotropium 18 MICROgram(s) Capsule 1 Capsule(s) Inhalation daily    MEDICATIONS  (PRN):  acetaminophen   Tablet .. 650 milliGRAM(s) Oral every 6 hours PRN Temp greater or equal to 38C (100.4F), Moderate Pain (4 - 6)  dextrose 40% Gel 15 Gram(s) Oral once PRN Blood Glucose LESS THAN 70 milliGRAM(s)/deciliter  glucagon  Injectable 1 milliGRAM(s) IntraMuscular once PRN Glucose LESS THAN 70 milligrams/deciliter  guaiFENesin   Syrup  (Sugar-Free) 100 milliGRAM(s) Oral every 6 hours PRN Cough  ondansetron Injectable 4 milliGRAM(s) IV Push every 6 hours PRN Nausea and/or Vomiting    Allergies    No Known Allergies    Intolerances        Vital Signs Last 24 Hrs  T(C): 37.3 (25 Jun 2019 20:58), Max: 37.3 (25 Jun 2019 20:58)  T(F): 99.1 (25 Jun 2019 20:58), Max: 99.1 (25 Jun 2019 20:58)  HR: 102 (25 Jun 2019 20:58) (101 - 104)  BP: 142/95 (25 Jun 2019 20:58) (128/90 - 142/95)  BP(mean): --  RR: 18 (25 Jun 2019 20:58) (18 - 18)  SpO2: 100% (25 Jun 2019 20:58) (97% - 100%)      PHYSICAL EXAM:  NAD  L shoulder: not warm, not erythematous, skin intact  ROM: no AROM of L shoulder. PROM 0-90 forward flexion, 0-60 abduction with moderate pain  Motor exam limited 2/2 to pain  +SILT Ax/M/R/U/Msc  2+ Rad pulse, WWP                            13.9   9.07  )-----------( 147      ( 25 Jun 2019 06:20 )             43.9     06-25    138  |  101  |  13  ----------------------------<  113<H>  4.0   |  26  |  1.21    Ca    9.3      25 Jun 2019 06:20  Phos  2.9     06-25  Mg     1.8     06-25      IMAGING STUDIES:  < from: Xray Shoulder 2 Views, Left (06.25.19 @ 15:05) >  IMPRESSION:  No fractures, dislocations, or AC separation.    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     Appearance of thick acromial undersurface bony spurring which could   correlate with or predispose to a subacromial impingement process.     No destructive osseous lesions or periosteal reaction.    No periarticular soft tissue calcifications.    C-Reactive Protein, Serum (06.25.19 @ 11:19)    C-Reactive Protein, Serum: 193.2 mg/L    Sedimentation Rate, Erythrocyte: 67 mm/hr (06.25.19 @ 11:19)      Procedure:  L shoulder was aspirated with sterile technique which yielded 0.5 cc s/s fluid. Fluid sent for cell count, as not enough specimen to run crystals, culture, and gram stain.  Repeat L shoulder aspiration attempted under sterile technique which yielded a dry tap.         89y Male w/ L shoulder pain for 1-2 months, intermittent fevers and elevated ESR/CRP currently on CFTX for pneumonia. Ortho consulted to r/o L septic shoulder. XR of L shoulder shows advanced glenohumeral and AC joint arthritis. Aspiration of L shoulder did not yield enough fluid for tests. Pain likely secondary to osteoarthritis changes given chronicity of shoulder pain.  - Pain control  - WBAT LUE  - Plan to be discussed with attending

## 2019-06-26 LAB
ANION GAP SERPL CALC-SCNC: 13 MMO/L — SIGNIFICANT CHANGE UP (ref 7–14)
BACTERIA BLD CULT: SIGNIFICANT CHANGE UP
BACTERIA BLD CULT: SIGNIFICANT CHANGE UP
BUN SERPL-MCNC: 15 MG/DL — SIGNIFICANT CHANGE UP (ref 7–23)
CALCIUM SERPL-MCNC: 9.3 MG/DL — SIGNIFICANT CHANGE UP (ref 8.4–10.5)
CHLORIDE SERPL-SCNC: 99 MMOL/L — SIGNIFICANT CHANGE UP (ref 98–107)
CO2 SERPL-SCNC: 27 MMOL/L — SIGNIFICANT CHANGE UP (ref 22–31)
CREAT SERPL-MCNC: 1.26 MG/DL — SIGNIFICANT CHANGE UP (ref 0.5–1.3)
GLUCOSE BLDC GLUCOMTR-MCNC: 109 MG/DL — HIGH (ref 70–99)
GLUCOSE BLDC GLUCOMTR-MCNC: 129 MG/DL — HIGH (ref 70–99)
GLUCOSE BLDC GLUCOMTR-MCNC: 97 MG/DL — SIGNIFICANT CHANGE UP (ref 70–99)
GLUCOSE BLDC GLUCOMTR-MCNC: 99 MG/DL — SIGNIFICANT CHANGE UP (ref 70–99)
GLUCOSE SERPL-MCNC: 100 MG/DL — HIGH (ref 70–99)
HCT VFR BLD CALC: 44.3 % — SIGNIFICANT CHANGE UP (ref 39–50)
HGB BLD-MCNC: 14 G/DL — SIGNIFICANT CHANGE UP (ref 13–17)
MAGNESIUM SERPL-MCNC: 1.9 MG/DL — SIGNIFICANT CHANGE UP (ref 1.6–2.6)
MCHC RBC-ENTMCNC: 28.6 PG — SIGNIFICANT CHANGE UP (ref 27–34)
MCHC RBC-ENTMCNC: 31.6 % — LOW (ref 32–36)
MCV RBC AUTO: 90.4 FL — SIGNIFICANT CHANGE UP (ref 80–100)
NRBC # FLD: 0 K/UL — SIGNIFICANT CHANGE UP (ref 0–0)
PHOSPHATE SERPL-MCNC: 3.1 MG/DL — SIGNIFICANT CHANGE UP (ref 2.5–4.5)
PLATELET # BLD AUTO: 154 K/UL — SIGNIFICANT CHANGE UP (ref 150–400)
PMV BLD: 11.4 FL — SIGNIFICANT CHANGE UP (ref 7–13)
POTASSIUM SERPL-MCNC: 4.2 MMOL/L — SIGNIFICANT CHANGE UP (ref 3.5–5.3)
POTASSIUM SERPL-SCNC: 4.2 MMOL/L — SIGNIFICANT CHANGE UP (ref 3.5–5.3)
RBC # BLD: 4.9 M/UL — SIGNIFICANT CHANGE UP (ref 4.2–5.8)
RBC # FLD: 12.7 % — SIGNIFICANT CHANGE UP (ref 10.3–14.5)
SODIUM SERPL-SCNC: 139 MMOL/L — SIGNIFICANT CHANGE UP (ref 135–145)
SPECIMEN SOURCE: SIGNIFICANT CHANGE UP
SPECIMEN SOURCE: SIGNIFICANT CHANGE UP
WBC # BLD: 7.57 K/UL — SIGNIFICANT CHANGE UP (ref 3.8–10.5)
WBC # FLD AUTO: 7.57 K/UL — SIGNIFICANT CHANGE UP (ref 3.8–10.5)

## 2019-06-26 PROCEDURE — 99233 SBSQ HOSP IP/OBS HIGH 50: CPT

## 2019-06-26 RX ORDER — SENNA PLUS 8.6 MG/1
2 TABLET ORAL AT BEDTIME
Refills: 0 | Status: DISCONTINUED | OUTPATIENT
Start: 2019-06-26 | End: 2019-07-02

## 2019-06-26 RX ADMIN — HEPARIN SODIUM 5000 UNIT(S): 5000 INJECTION INTRAVENOUS; SUBCUTANEOUS at 13:25

## 2019-06-26 RX ADMIN — HEPARIN SODIUM 5000 UNIT(S): 5000 INJECTION INTRAVENOUS; SUBCUTANEOUS at 22:30

## 2019-06-26 RX ADMIN — CLOPIDOGREL BISULFATE 75 MILLIGRAM(S): 75 TABLET, FILM COATED ORAL at 12:03

## 2019-06-26 RX ADMIN — TIOTROPIUM BROMIDE 1 CAPSULE(S): 18 CAPSULE ORAL; RESPIRATORY (INHALATION) at 12:03

## 2019-06-26 RX ADMIN — FINASTERIDE 5 MILLIGRAM(S): 5 TABLET, FILM COATED ORAL at 12:03

## 2019-06-26 RX ADMIN — CEFTRIAXONE 100 MILLIGRAM(S): 500 INJECTION, POWDER, FOR SOLUTION INTRAMUSCULAR; INTRAVENOUS at 17:18

## 2019-06-26 RX ADMIN — Medication 650 MILLIGRAM(S): at 08:12

## 2019-06-26 RX ADMIN — Medication 650 MILLIGRAM(S): at 09:09

## 2019-06-26 RX ADMIN — Medication 100 MILLIGRAM(S): at 07:58

## 2019-06-26 RX ADMIN — Medication 10 MILLIGRAM(S): at 17:18

## 2019-06-26 RX ADMIN — SENNA PLUS 2 TABLET(S): 8.6 TABLET ORAL at 22:30

## 2019-06-26 RX ADMIN — Medication 100 MILLIGRAM(S): at 17:18

## 2019-06-26 RX ADMIN — Medication 81 MILLIGRAM(S): at 12:03

## 2019-06-26 RX ADMIN — HEPARIN SODIUM 5000 UNIT(S): 5000 INJECTION INTRAVENOUS; SUBCUTANEOUS at 07:34

## 2019-06-26 NOTE — PROGRESS NOTE ADULT - ASSESSMENT
89  M with  HTN, DM, CKDIII, BPH, CAD, ? A fib brought in for fever, chills, sweats, generalized weakness, on further questioning he stated that he has rhinorrhea with some cough and has occasional back pain but no dysuria.   febrile to 100.5, tachy to 98 exam with L CVAT  WBC: 16.9, RVP negative  u/a just 6-10 WBC  CXR no consolidation    * fever, leukocytosis, sepsis likely URI vs pneumonia  blood and urine cx negative, WBC normalized but again spiked to 100.4 after 4 days of antibiotics with a L shoulder pain, the cough has improved and pt has no other focal symptoms  ESR: 67   CRP:  193  shoulder Xray s/o advanced arthritis, shoulder tap did not yield enough fluid for testing  ortho's impression is arthritis    * repeat blood cultures negative for now  * c/w ceftriaxone 1 g qd, started 6/21, now day 6  * if fever or worsening shoulder pain will need MRI

## 2019-06-26 NOTE — CHART NOTE - NSCHARTNOTEFT_GEN_A_CORE
Patient seen at bedside with Attending Dr Alejandro present. As per attending, shoulder pain seems likely to bursitis and Osteoarthritis. States if pain continues to worsen, or patient becomes febrile, may get an MRI of the shoulder. No further orthopaedic or surgical intervention required at moment.

## 2019-06-27 ENCOUNTER — TRANSCRIPTION ENCOUNTER (OUTPATIENT)
Age: 84
End: 2019-06-27

## 2019-06-27 DIAGNOSIS — R50.9 FEVER, UNSPECIFIED: ICD-10-CM

## 2019-06-27 LAB
GLUCOSE BLDC GLUCOMTR-MCNC: 104 MG/DL — HIGH (ref 70–99)
GLUCOSE BLDC GLUCOMTR-MCNC: 115 MG/DL — HIGH (ref 70–99)
GLUCOSE BLDC GLUCOMTR-MCNC: 120 MG/DL — HIGH (ref 70–99)
GLUCOSE BLDC GLUCOMTR-MCNC: 99 MG/DL — SIGNIFICANT CHANGE UP (ref 70–99)

## 2019-06-27 PROCEDURE — 99232 SBSQ HOSP IP/OBS MODERATE 35: CPT

## 2019-06-27 RX ORDER — POLYETHYLENE GLYCOL 3350 17 G/17G
17 POWDER, FOR SOLUTION ORAL DAILY
Refills: 0 | Status: DISCONTINUED | OUTPATIENT
Start: 2019-06-27 | End: 2019-07-02

## 2019-06-27 RX ORDER — AMLODIPINE BESYLATE 2.5 MG/1
5 TABLET ORAL DAILY
Refills: 0 | Status: DISCONTINUED | OUTPATIENT
Start: 2019-06-27 | End: 2019-07-02

## 2019-06-27 RX ORDER — PROPRANOLOL HCL 160 MG
20 CAPSULE, EXTENDED RELEASE 24HR ORAL DAILY
Refills: 0 | Status: DISCONTINUED | OUTPATIENT
Start: 2019-06-27 | End: 2019-07-02

## 2019-06-27 RX ADMIN — Medication 10 MILLIGRAM(S): at 12:34

## 2019-06-27 RX ADMIN — Medication 1 ENEMA: at 17:45

## 2019-06-27 RX ADMIN — HEPARIN SODIUM 5000 UNIT(S): 5000 INJECTION INTRAVENOUS; SUBCUTANEOUS at 21:52

## 2019-06-27 RX ADMIN — HEPARIN SODIUM 5000 UNIT(S): 5000 INJECTION INTRAVENOUS; SUBCUTANEOUS at 06:30

## 2019-06-27 RX ADMIN — CLOPIDOGREL BISULFATE 75 MILLIGRAM(S): 75 TABLET, FILM COATED ORAL at 12:34

## 2019-06-27 RX ADMIN — SENNA PLUS 2 TABLET(S): 8.6 TABLET ORAL at 21:52

## 2019-06-27 RX ADMIN — FINASTERIDE 5 MILLIGRAM(S): 5 TABLET, FILM COATED ORAL at 12:34

## 2019-06-27 RX ADMIN — Medication 100 MILLIGRAM(S): at 17:45

## 2019-06-27 RX ADMIN — POLYETHYLENE GLYCOL 3350 17 GRAM(S): 17 POWDER, FOR SOLUTION ORAL at 17:44

## 2019-06-27 RX ADMIN — Medication 100 MILLIGRAM(S): at 06:31

## 2019-06-27 RX ADMIN — Medication 81 MILLIGRAM(S): at 12:34

## 2019-06-27 RX ADMIN — TIOTROPIUM BROMIDE 1 CAPSULE(S): 18 CAPSULE ORAL; RESPIRATORY (INHALATION) at 08:41

## 2019-06-27 RX ADMIN — AMLODIPINE BESYLATE 5 MILLIGRAM(S): 2.5 TABLET ORAL at 17:44

## 2019-06-27 NOTE — DISCHARGE NOTE PROVIDER - NSDCCPCAREPLAN_GEN_ALL_CORE_FT
PRINCIPAL DISCHARGE DIAGNOSIS  Diagnosis: Sepsis, due to unspecified organism  Assessment and Plan of Treatment: Sepsis, due to unspecified organism      SECONDARY DISCHARGE DIAGNOSES  Diagnosis: Pneumonia due to infectious organism, unspecified laterality, unspecified part of lung  Assessment and Plan of Treatment: completed 7 days of  IV antibiotics in the hospital    Diagnosis: Constipation  Assessment and Plan of Treatment: continue bowel regimen    Diagnosis: Type 2 diabetes mellitus with stage 3 chronic kidney disease, without long-term current use of insulin  Assessment and Plan of Treatment: HgbA1c 6.0  No further need for Glipizide and Metformin    Diagnosis: Essential hypertension  Assessment and Plan of Treatment: continue Amlodipine    Diagnosis: Atrial fibrillation, unspecified type  Assessment and Plan of Treatment: continue Propanolol    Diagnosis: CKD (chronic kidney disease), stage III  Assessment and Plan of Treatment: PRINCIPAL DISCHARGE DIAGNOSIS  Diagnosis: Sepsis, due to unspecified organism  Assessment and Plan of Treatment: Sepsis, due to unspecified organism      SECONDARY DISCHARGE DIAGNOSES  Diagnosis: Type 2 diabetes mellitus with stage 3 chronic kidney disease, without long-term current use of insulin  Assessment and Plan of Treatment: HgbA1c 6.0  No further need for Glipizide and Metformin    Diagnosis: Constipation  Assessment and Plan of Treatment: continue bowel regimen    Diagnosis: Essential hypertension  Assessment and Plan of Treatment: continue Amlodipine    Diagnosis: CKD (chronic kidney disease), stage III  Assessment and Plan of Treatment:     Diagnosis: Atrial fibrillation, unspecified type  Assessment and Plan of Treatment: continue Propanolol    Diagnosis: Pneumonia due to infectious organism, unspecified laterality, unspecified part of lung  Assessment and Plan of Treatment: completed 7 days of  IV antibiotics in the hospital

## 2019-06-27 NOTE — DISCHARGE NOTE PROVIDER - HOSPITAL COURSE
89  M with  HTN, DM, CKDIII, BPH, CAD, A fib brought in for fever, chills, sweats, generalized weakness, + cough, Pt admitted with Sepsis 2/2 URI vs PNA        Sepsis 2/2 pneumonia     - RVP neg    -CxR- no consolidation    - started on ceftriaxone    - s/p azithromycin x3 days, last dose 6/23    - House ID -Rec. total 7 days abx    -Bcx 6/21-neg    -Bcx 6/25-negative         HTN, Afib     - continue ASA, Plavix          DM    - sliding scale     - hgba1c 6    - 6/22 had episodes of hypoglycemia, FS 57, s/p D50, improved. Hypoglycemia 2/2 decreased Po intake.    -Blood glucose now stable, Pt takes glipizide and Metformin at home, no further need for these medication.         CKD stage III    - Avoid nephrotoxins    Cr stable        Shoulder Pain    -Shoulder Xray: XR of L shoulder shows advanced glenohumeral and AC joint arthritis.    Ortho consulted --s/p Aspiration of synovial fluid for L shoulder, r/o septic joint, insufficient quantity for studies, was re-attempted again by ortho was dry tap. pain control & WBAT        PT eval-Rehab 89  M with  HTN, DM, CKDIII, BPH, CAD, A fib brought in for fever, chills, sweats, generalized weakness, + cough, Pt admitted with Sepsis 2/2 URI vs PNA        Sepsis 2/2 pneumonia     - RVP neg    -CxR- no consolidation    - started on ceftriaxone    - s/p azithromycin x3 days, last dose 6/23    - House ID -Rec. total 7 days abx    -Bcx 6/21-neg    -Bcx 6/25-negative         Shoulder Pain    -Shoulder Xray: XR of L shoulder shows advanced glenohumeral and AC joint arthritis.    Ortho consulted --s/p Aspiration of synovial fluid for L shoulder, r/o septic joint, insufficient quantity for studies, was re-attempted again by ortho was dry tap. pain control & WBAT        Constipation    -Bowel regimen-colace, senna, miralax    -6/26-s/p dulcolax    -6/27-s/p Fleet        HTN, Afib     - continue ASA, Plavix          DM    - sliding scale     - hgba1c 6    - 6/22 had episodes of hypoglycemia, FS 57, s/p D50, improved. Hypoglycemia 2/2 decreased Po intake.    -Blood glucose now stable, Pt takes glipizide and Metformin at home, no further need for these medication.         CKD stage III    - Avoid nephrotoxins    Cr stable            PT eval-Rehab

## 2019-06-27 NOTE — PROGRESS NOTE ADULT - ASSESSMENT
89  M with  HTN, DM, CKDIII, BPH, CAD, ? A fib brought in for fever, chills, sweats, generalized weakness, on further questioning he stated that he has rhinorrhea with some cough and has occasional back pain but no dysuria.   febrile to 100.5, tachy to 98 exam with L CVAT  WBC: 16.9, RVP negative  u/a just 6-10 WBC  CXR no consolidation    * fever, leukocytosis, sepsis likely URI vs pneumonia  blood and urine cx negative, WBC normalized but again spiked to 100.4 after 4 days of antibiotics with a L shoulder pain, the cough has improved and pt has no other focal symptoms  ESR: 67   CRP:  193  shoulder Xray s/o advanced arthritis, shoulder tap did not yield enough fluid for testing  ortho's impression is arthritis    * completed the 7 day course of ceftriaxone now being monitored off and afebrile  * repeat blood cultures negative   * if fever or worsening shoulder pain will need MRI and pan culture

## 2019-06-28 LAB
ANION GAP SERPL CALC-SCNC: 11 MMO/L — SIGNIFICANT CHANGE UP (ref 7–14)
BUN SERPL-MCNC: 16 MG/DL — SIGNIFICANT CHANGE UP (ref 7–23)
CALCIUM SERPL-MCNC: 10.3 MG/DL — SIGNIFICANT CHANGE UP (ref 8.4–10.5)
CHLORIDE SERPL-SCNC: 100 MMOL/L — SIGNIFICANT CHANGE UP (ref 98–107)
CO2 SERPL-SCNC: 28 MMOL/L — SIGNIFICANT CHANGE UP (ref 22–31)
CREAT SERPL-MCNC: 1.23 MG/DL — SIGNIFICANT CHANGE UP (ref 0.5–1.3)
GLUCOSE BLDC GLUCOMTR-MCNC: 105 MG/DL — HIGH (ref 70–99)
GLUCOSE BLDC GLUCOMTR-MCNC: 115 MG/DL — HIGH (ref 70–99)
GLUCOSE BLDC GLUCOMTR-MCNC: 122 MG/DL — HIGH (ref 70–99)
GLUCOSE BLDC GLUCOMTR-MCNC: 82 MG/DL — SIGNIFICANT CHANGE UP (ref 70–99)
GLUCOSE SERPL-MCNC: 114 MG/DL — HIGH (ref 70–99)
HCT VFR BLD CALC: 47.1 % — SIGNIFICANT CHANGE UP (ref 39–50)
HGB BLD-MCNC: 15 G/DL — SIGNIFICANT CHANGE UP (ref 13–17)
MAGNESIUM SERPL-MCNC: 2.1 MG/DL — SIGNIFICANT CHANGE UP (ref 1.6–2.6)
MCHC RBC-ENTMCNC: 28.3 PG — SIGNIFICANT CHANGE UP (ref 27–34)
MCHC RBC-ENTMCNC: 31.8 % — LOW (ref 32–36)
MCV RBC AUTO: 88.9 FL — SIGNIFICANT CHANGE UP (ref 80–100)
NRBC # FLD: 0 K/UL — SIGNIFICANT CHANGE UP (ref 0–0)
PLATELET # BLD AUTO: 193 K/UL — SIGNIFICANT CHANGE UP (ref 150–400)
PMV BLD: 11.6 FL — SIGNIFICANT CHANGE UP (ref 7–13)
POTASSIUM SERPL-MCNC: 4.4 MMOL/L — SIGNIFICANT CHANGE UP (ref 3.5–5.3)
POTASSIUM SERPL-SCNC: 4.4 MMOL/L — SIGNIFICANT CHANGE UP (ref 3.5–5.3)
RBC # BLD: 5.3 M/UL — SIGNIFICANT CHANGE UP (ref 4.2–5.8)
RBC # FLD: 12.5 % — SIGNIFICANT CHANGE UP (ref 10.3–14.5)
SODIUM SERPL-SCNC: 139 MMOL/L — SIGNIFICANT CHANGE UP (ref 135–145)
WBC # BLD: 9.55 K/UL — SIGNIFICANT CHANGE UP (ref 3.8–10.5)
WBC # FLD AUTO: 9.55 K/UL — SIGNIFICANT CHANGE UP (ref 3.8–10.5)

## 2019-06-28 PROCEDURE — 99232 SBSQ HOSP IP/OBS MODERATE 35: CPT | Mod: GC

## 2019-06-28 RX ADMIN — TIOTROPIUM BROMIDE 1 CAPSULE(S): 18 CAPSULE ORAL; RESPIRATORY (INHALATION) at 12:00

## 2019-06-28 RX ADMIN — Medication 100 MILLIGRAM(S): at 18:09

## 2019-06-28 RX ADMIN — HEPARIN SODIUM 5000 UNIT(S): 5000 INJECTION INTRAVENOUS; SUBCUTANEOUS at 05:53

## 2019-06-28 RX ADMIN — SENNA PLUS 2 TABLET(S): 8.6 TABLET ORAL at 21:45

## 2019-06-28 RX ADMIN — FINASTERIDE 5 MILLIGRAM(S): 5 TABLET, FILM COATED ORAL at 12:00

## 2019-06-28 RX ADMIN — Medication 20 MILLIGRAM(S): at 05:53

## 2019-06-28 RX ADMIN — AMLODIPINE BESYLATE 5 MILLIGRAM(S): 2.5 TABLET ORAL at 05:53

## 2019-06-28 RX ADMIN — Medication 100 MILLIGRAM(S): at 05:53

## 2019-06-28 RX ADMIN — HEPARIN SODIUM 5000 UNIT(S): 5000 INJECTION INTRAVENOUS; SUBCUTANEOUS at 21:45

## 2019-06-28 RX ADMIN — CLOPIDOGREL BISULFATE 75 MILLIGRAM(S): 75 TABLET, FILM COATED ORAL at 12:00

## 2019-06-28 RX ADMIN — HEPARIN SODIUM 5000 UNIT(S): 5000 INJECTION INTRAVENOUS; SUBCUTANEOUS at 15:49

## 2019-06-28 RX ADMIN — Medication 81 MILLIGRAM(S): at 12:00

## 2019-06-28 NOTE — PROGRESS NOTE ADULT - ASSESSMENT
89  M with  HTN, DM, CKDIII, BPH, CAD, ? A fib brought in for fever, chills, sweats, generalized weakness, on further questioning he stated that he has rhinorrhea with some cough and has occasional back pain but no dysuria.   febrile to 100.5, tachy to 98 exam with L CVAT  WBC: 16.9, RVP negative  u/a just 6-10 WBC  CXR no consolidation    * fever, leukocytosis, sepsis likely URI vs pneumonia  blood and urine cx negative, WBC normalized but again spiked to 100.4 after 4 days of antibiotics with a L shoulder pain  ESR: 67   CRP:  193  shoulder Xray s/o advanced arthritis, shoulder tap did not yield enough fluid for testing  ortho's impression is arthritis and now he has been off antibiotics with no fever or WBC    * completed the 7 day course of ceftriaxone now off antibiotics and afebrile  * repeat blood cultures negative   * will sign off, please call if fever or worsening shoulder pain then need MRI and pan culture

## 2019-06-28 NOTE — DIETITIAN INITIAL EVALUATION ADULT. - OTHER INFO
89 y/o male admitted with the DX of HTN, DM, CKD stage 3, UTI, reported to have good po and appetite with no Nausea, vomiting and diarrhea. Pt denies and eating chewing difficulty and significant weight changes at this time. BMI remains 22. Labs reviewed, phos level improved, continue to monitor renal labs and adjust diet accordingly. Pt aware of diet restriction, refused written material. Nutrition education provided. RD remains available.

## 2019-06-29 LAB
GLUCOSE BLDC GLUCOMTR-MCNC: 100 MG/DL — HIGH (ref 70–99)
GLUCOSE BLDC GLUCOMTR-MCNC: 129 MG/DL — HIGH (ref 70–99)
GLUCOSE BLDC GLUCOMTR-MCNC: 73 MG/DL — SIGNIFICANT CHANGE UP (ref 70–99)
GLUCOSE BLDC GLUCOMTR-MCNC: 95 MG/DL — SIGNIFICANT CHANGE UP (ref 70–99)

## 2019-06-29 RX ADMIN — TIOTROPIUM BROMIDE 1 CAPSULE(S): 18 CAPSULE ORAL; RESPIRATORY (INHALATION) at 09:10

## 2019-06-29 RX ADMIN — Medication 100 MILLIGRAM(S): at 05:30

## 2019-06-29 RX ADMIN — CLOPIDOGREL BISULFATE 75 MILLIGRAM(S): 75 TABLET, FILM COATED ORAL at 11:14

## 2019-06-29 RX ADMIN — AMLODIPINE BESYLATE 5 MILLIGRAM(S): 2.5 TABLET ORAL at 05:30

## 2019-06-29 RX ADMIN — HEPARIN SODIUM 5000 UNIT(S): 5000 INJECTION INTRAVENOUS; SUBCUTANEOUS at 21:56

## 2019-06-29 RX ADMIN — Medication 81 MILLIGRAM(S): at 11:14

## 2019-06-29 RX ADMIN — Medication 20 MILLIGRAM(S): at 05:30

## 2019-06-29 RX ADMIN — SENNA PLUS 2 TABLET(S): 8.6 TABLET ORAL at 21:55

## 2019-06-29 RX ADMIN — FINASTERIDE 5 MILLIGRAM(S): 5 TABLET, FILM COATED ORAL at 11:14

## 2019-06-29 RX ADMIN — HEPARIN SODIUM 5000 UNIT(S): 5000 INJECTION INTRAVENOUS; SUBCUTANEOUS at 05:29

## 2019-06-29 RX ADMIN — HEPARIN SODIUM 5000 UNIT(S): 5000 INJECTION INTRAVENOUS; SUBCUTANEOUS at 13:08

## 2019-06-29 RX ADMIN — POLYETHYLENE GLYCOL 3350 17 GRAM(S): 17 POWDER, FOR SOLUTION ORAL at 11:14

## 2019-06-29 RX ADMIN — Medication 100 MILLIGRAM(S): at 21:55

## 2019-06-29 RX ADMIN — Medication 100 MILLIGRAM(S): at 17:17

## 2019-06-30 LAB
BACTERIA BLD CULT: SIGNIFICANT CHANGE UP
BACTERIA BLD CULT: SIGNIFICANT CHANGE UP
GLUCOSE BLDC GLUCOMTR-MCNC: 102 MG/DL — HIGH (ref 70–99)
GLUCOSE BLDC GLUCOMTR-MCNC: 115 MG/DL — HIGH (ref 70–99)
GLUCOSE BLDC GLUCOMTR-MCNC: 125 MG/DL — HIGH (ref 70–99)
GLUCOSE BLDC GLUCOMTR-MCNC: 88 MG/DL — SIGNIFICANT CHANGE UP (ref 70–99)
L PNEUMO AG UR QL: NEGATIVE — SIGNIFICANT CHANGE UP

## 2019-06-30 RX ADMIN — HEPARIN SODIUM 5000 UNIT(S): 5000 INJECTION INTRAVENOUS; SUBCUTANEOUS at 13:03

## 2019-06-30 RX ADMIN — Medication 100 MILLIGRAM(S): at 06:11

## 2019-06-30 RX ADMIN — Medication 81 MILLIGRAM(S): at 11:25

## 2019-06-30 RX ADMIN — AMLODIPINE BESYLATE 5 MILLIGRAM(S): 2.5 TABLET ORAL at 06:11

## 2019-06-30 RX ADMIN — HEPARIN SODIUM 5000 UNIT(S): 5000 INJECTION INTRAVENOUS; SUBCUTANEOUS at 21:22

## 2019-06-30 RX ADMIN — Medication 20 MILLIGRAM(S): at 06:11

## 2019-06-30 RX ADMIN — SENNA PLUS 2 TABLET(S): 8.6 TABLET ORAL at 21:23

## 2019-06-30 RX ADMIN — Medication 100 MILLIGRAM(S): at 17:43

## 2019-06-30 RX ADMIN — CLOPIDOGREL BISULFATE 75 MILLIGRAM(S): 75 TABLET, FILM COATED ORAL at 11:25

## 2019-06-30 RX ADMIN — TIOTROPIUM BROMIDE 1 CAPSULE(S): 18 CAPSULE ORAL; RESPIRATORY (INHALATION) at 09:21

## 2019-06-30 RX ADMIN — HEPARIN SODIUM 5000 UNIT(S): 5000 INJECTION INTRAVENOUS; SUBCUTANEOUS at 06:11

## 2019-06-30 RX ADMIN — POLYETHYLENE GLYCOL 3350 17 GRAM(S): 17 POWDER, FOR SOLUTION ORAL at 11:25

## 2019-06-30 RX ADMIN — FINASTERIDE 5 MILLIGRAM(S): 5 TABLET, FILM COATED ORAL at 11:25

## 2019-07-01 LAB
GLUCOSE BLDC GLUCOMTR-MCNC: 114 MG/DL — HIGH (ref 70–99)
GLUCOSE BLDC GLUCOMTR-MCNC: 115 MG/DL — HIGH (ref 70–99)
GLUCOSE BLDC GLUCOMTR-MCNC: 116 MG/DL — HIGH (ref 70–99)
GLUCOSE BLDC GLUCOMTR-MCNC: 98 MG/DL — SIGNIFICANT CHANGE UP (ref 70–99)

## 2019-07-01 RX ADMIN — Medication 81 MILLIGRAM(S): at 11:07

## 2019-07-01 RX ADMIN — HEPARIN SODIUM 5000 UNIT(S): 5000 INJECTION INTRAVENOUS; SUBCUTANEOUS at 13:19

## 2019-07-01 RX ADMIN — CLOPIDOGREL BISULFATE 75 MILLIGRAM(S): 75 TABLET, FILM COATED ORAL at 11:07

## 2019-07-01 RX ADMIN — POLYETHYLENE GLYCOL 3350 17 GRAM(S): 17 POWDER, FOR SOLUTION ORAL at 11:06

## 2019-07-01 RX ADMIN — HEPARIN SODIUM 5000 UNIT(S): 5000 INJECTION INTRAVENOUS; SUBCUTANEOUS at 06:02

## 2019-07-01 RX ADMIN — TIOTROPIUM BROMIDE 1 CAPSULE(S): 18 CAPSULE ORAL; RESPIRATORY (INHALATION) at 11:07

## 2019-07-01 RX ADMIN — Medication 20 MILLIGRAM(S): at 06:02

## 2019-07-01 RX ADMIN — AMLODIPINE BESYLATE 5 MILLIGRAM(S): 2.5 TABLET ORAL at 06:02

## 2019-07-01 RX ADMIN — Medication 100 MILLIGRAM(S): at 06:03

## 2019-07-01 RX ADMIN — HEPARIN SODIUM 5000 UNIT(S): 5000 INJECTION INTRAVENOUS; SUBCUTANEOUS at 22:21

## 2019-07-01 RX ADMIN — Medication 100 MILLIGRAM(S): at 17:15

## 2019-07-01 RX ADMIN — FINASTERIDE 5 MILLIGRAM(S): 5 TABLET, FILM COATED ORAL at 11:07

## 2019-07-01 RX ADMIN — SENNA PLUS 2 TABLET(S): 8.6 TABLET ORAL at 22:21

## 2019-07-01 RX ADMIN — Medication 100 MILLIGRAM(S): at 14:28

## 2019-07-01 NOTE — PROGRESS NOTE ADULT - PROBLEM SELECTOR PROBLEM 1
Pneumonia due to infectious organism, unspecified laterality, unspecified part of lung

## 2019-07-01 NOTE — PROGRESS NOTE ADULT - PROBLEM SELECTOR PROBLEM 4
Type 2 diabetes mellitus with stage 3 chronic kidney disease, without long-term current use of insulin

## 2019-07-01 NOTE — PROGRESS NOTE ADULT - PROBLEM SELECTOR PLAN 5
- Cr close to baseline   - Will continue to monitor   - Avoid nephrotoxins

## 2019-07-01 NOTE — PROGRESS NOTE ADULT - PROVIDER SPECIALTY LIST ADULT
Infectious Disease
Internal Medicine
Infectious Disease
Internal Medicine

## 2019-07-01 NOTE — PROGRESS NOTE ADULT - SUBJECTIVE AND OBJECTIVE BOX
Follow Up:  fever    Interval History: no more fevers, the shoulder xray s/o advanced arthritis and tap did not yield enough fluid    ROS:      All other systems negative    Constitutional: no fever, no chills  Head: no trauma  Eyes: no vision changes, no eye pain  ENT:  no sore throat, no rhinorrhea  Cardiovascular:  no chest pain, no palpitation  Respiratory:  no SOB, improving cough  GI:  no abd pain, no vomiting, no diarrhea  urinary: no dysuria, no hematuria, no flank pain but R back pain?  musculoskeletal:  L shoulder pain slightly better  skin:  no rash  neurology:  no headache, no seizure, no change in mental status  psych: no anxiety, no depression         Allergies  No Known Allergies        ANTIMICROBIALS:      OTHER MEDS:  acetaminophen   Tablet .. 650 milliGRAM(s) Oral every 6 hours PRN  amLODIPine   Tablet 5 milliGRAM(s) Oral daily  aspirin enteric coated 81 milliGRAM(s) Oral daily  bisacodyl Suppository 10 milliGRAM(s) Rectal daily PRN  clopidogrel Tablet 75 milliGRAM(s) Oral daily  dextrose 40% Gel 15 Gram(s) Oral once PRN  dextrose 5%. 1000 milliLiter(s) IV Continuous <Continuous>  dextrose 50% Injectable 12.5 Gram(s) IV Push once  dextrose 50% Injectable 25 Gram(s) IV Push once  dextrose 50% Injectable 25 Gram(s) IV Push once  docusate sodium 100 milliGRAM(s) Oral two times a day  finasteride 5 milliGRAM(s) Oral daily  glucagon  Injectable 1 milliGRAM(s) IntraMuscular once PRN  guaiFENesin   Syrup  (Sugar-Free) 100 milliGRAM(s) Oral every 6 hours PRN  heparin  Injectable 5000 Unit(s) SubCutaneous every 8 hours  insulin lispro (HumaLOG) corrective regimen sliding scale   SubCutaneous three times a day before meals  insulin lispro (HumaLOG) corrective regimen sliding scale   SubCutaneous at bedtime  ondansetron Injectable 4 milliGRAM(s) IV Push every 6 hours PRN  polyethylene glycol 3350 17 Gram(s) Oral daily  propranolol 20 milliGRAM(s) Oral daily  saline laxative (FLEET) Rectal Enema 1 Enema Rectal once  senna 2 Tablet(s) Oral at bedtime  tiotropium 18 MICROgram(s) Capsule 1 Capsule(s) Inhalation daily      Vital Signs Last 24 Hrs  T(C): 36.8 (27 Jun 2019 13:15), Max: 37.3 (26 Jun 2019 20:51)  T(F): 98.2 (27 Jun 2019 13:15), Max: 99.1 (26 Jun 2019 20:51)  HR: 93 (27 Jun 2019 13:15) (90 - 93)  BP: 153/93 (27 Jun 2019 13:15) (127/90 - 153/93)  BP(mean): --  RR: 18 (27 Jun 2019 06:31) (18 - 20)  SpO2: 100% (27 Jun 2019 13:15) (100% - 100%)    Physical Exam:  General:    NAD, non toxic  Head: atraumatic, normocephalic  Eyes: normal sclera and conjunctiva  ENT:   no oropharyngeal lesions, no LAD, neck supple  Cardio:    regular S1,S2, no murmur  Respiratory:   clear b/l, no wheezing  abd:   soft, BS +, not tender, no hepatosplenomegaly  :     no CVAT no suprapubic tenderness, no weeks  Musculoskeletal : L shoulder still edematous and slightly tender  no erythema, decreased ROM  Skin:    no rash  vascular: no phlebitis, normal pulses  Neurologic:     no focal deficits  psych: normal affect                          14.0   7.57  )-----------( 154      ( 26 Jun 2019 07:21 )             44.3       06-26    139  |  99  |  15  ----------------------------<  100<H>  4.2   |  27  |  1.26    Ca    9.3      26 Jun 2019 07:21  Phos  3.1     06-26  Mg     1.9     06-26            MICROBIOLOGY:  v  BLOOD VENOUS  06-25-19 --  --  --      BLOOD PERIPHERAL  06-25-19 --  --  --      URINE MIDSTREAM  06-21-19 --  --  --      BLOOD VENOUS  06-21-19 --  --  --      BLOOD PERIPHERAL  06-21-19 --  --  --                RADIOLOGY:  Images below reviewed personally  < from: Xray Shoulder 2 Views, Left (06.25.19 @ 15:05) >    IMPRESSION:  No fractures, dislocations, or AC separation.    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     Appearance of thick acromial undersurface bony spurring which could   correlate with or predispose to a subacromial impingement process.     No destructive osseous lesions or periosteal reaction.    No periarticular soft tissue calcifications.
Follow Up:  fever    Interval History: no more fevers and off antibitoics    ROS:      All other systems negative    Constitutional: no fever, no chills  Head: no trauma  Eyes: no vision changes, no eye pain  ENT:  no sore throat, no rhinorrhea  Cardiovascular:  no chest pain, no palpitation  Respiratory:  no SOB, improving cough  GI:  no abd pain, no vomiting, no diarrhea  urinary: no dysuria, no hematuria, no flank pain but R back pain?  musculoskeletal:  L shoulder pain slightly better  skin:  no rash  neurology:  no headache, no seizure, no change in mental status  psych: no anxiety, no depression         Allergies  No Known Allergies        ANTIMICROBIALS:      OTHER MEDS:  acetaminophen   Tablet .. 650 milliGRAM(s) Oral every 6 hours PRN  amLODIPine   Tablet 5 milliGRAM(s) Oral daily  aspirin enteric coated 81 milliGRAM(s) Oral daily  bisacodyl Suppository 10 milliGRAM(s) Rectal daily PRN  clopidogrel Tablet 75 milliGRAM(s) Oral daily  dextrose 40% Gel 15 Gram(s) Oral once PRN  dextrose 5%. 1000 milliLiter(s) IV Continuous <Continuous>  dextrose 50% Injectable 12.5 Gram(s) IV Push once  dextrose 50% Injectable 25 Gram(s) IV Push once  dextrose 50% Injectable 25 Gram(s) IV Push once  docusate sodium 100 milliGRAM(s) Oral two times a day  finasteride 5 milliGRAM(s) Oral daily  glucagon  Injectable 1 milliGRAM(s) IntraMuscular once PRN  guaiFENesin   Syrup  (Sugar-Free) 100 milliGRAM(s) Oral every 6 hours PRN  heparin  Injectable 5000 Unit(s) SubCutaneous every 8 hours  insulin lispro (HumaLOG) corrective regimen sliding scale   SubCutaneous three times a day before meals  insulin lispro (HumaLOG) corrective regimen sliding scale   SubCutaneous at bedtime  ondansetron Injectable 4 milliGRAM(s) IV Push every 6 hours PRN  polyethylene glycol 3350 17 Gram(s) Oral daily  propranolol 20 milliGRAM(s) Oral daily  senna 2 Tablet(s) Oral at bedtime  tiotropium 18 MICROgram(s) Capsule 1 Capsule(s) Inhalation daily      Vital Signs Last 24 Hrs  T(C): 36.6 (28 Jun 2019 05:44), Max: 36.8 (27 Jun 2019 13:15)  T(F): 97.9 (28 Jun 2019 05:44), Max: 98.2 (27 Jun 2019 13:15)  HR: 86 (28 Jun 2019 05:44) (86 - 96)  BP: 140/94 (28 Jun 2019 05:44) (139/81 - 153/93)  BP(mean): --  RR: 18 (28 Jun 2019 05:44) (18 - 20)  SpO2: 96% (28 Jun 2019 05:44) (96% - 100%)    Physical Exam:  General:    NAD, non toxic  Head: atraumatic, normocephalic  Eyes: normal sclera and conjunctiva  ENT:   no oropharyngeal lesions, no LAD, neck supple  Cardio:    regular S1,S2, no murmur  Respiratory:   clear b/l, no wheezing  abd:   soft, BS +, not tender, no hepatosplenomegaly  :     no CVAT no suprapubic tenderness, no weeks  Musculoskeletal : L shoulder still edematous and slightly tender  no erythema  Skin:    no rash  vascular: no phlebitis, normal pulses  Neurologic:     no focal deficits                        15.0   9.55  )-----------( 193      ( 28 Jun 2019 06:00 )             47.1       06-28    139  |  100  |  16  ----------------------------<  114<H>  4.4   |  28  |  1.23    Ca    10.3      28 Jun 2019 06:00  Mg     2.1     06-28            MICROBIOLOGY:  v  BLOOD VENOUS  06-25-19 --  --  --      BLOOD PERIPHERAL  06-25-19 --  --  --      URINE MIDSTREAM  06-21-19 --  --  --      BLOOD VENOUS  06-21-19 --  --  --      BLOOD PERIPHERAL  06-21-19 --  --  --                RADIOLOGY:  Images below reviewed personally  < from: Xray Shoulder 2 Views, Left (06.25.19 @ 15:05) >  No fractures, dislocations, or AC separation.    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     Appearance of thick acromial undersurface bony spurring which could   correlate with or predispose to a subacromial impingement process.     No destructive osseous lesions or periosteal reaction.    No periarticular soft tissue calcifications.
Follow Up:  fever    Interval History: no more fevers, the shoulder xray s/o advanced arthritis and tap did not yield enough fluid    ROS:      All other systems negative    Constitutional: no fever, no chills  Head: no trauma  Eyes: no vision changes, no eye pain  ENT:  no sore throat, no rhinorrhea  Cardiovascular:  no chest pain, no palpitation  Respiratory:  no SOB, improving cough  GI:  no abd pain, no vomiting, no diarrhea  urinary: no dysuria, no hematuria, no flank pain but R back pain?  musculoskeletal:  L shoulder pain slightly better  skin:  no rash  neurology:  no headache, no seizure, no change in mental status  psych: no anxiety, no depression         Allergies  No Known Allergies        ANTIMICROBIALS:  cefTRIAXone   IVPB 1000 every 24 hours      OTHER MEDS:  acetaminophen   Tablet .. 650 milliGRAM(s) Oral every 6 hours PRN  aspirin enteric coated 81 milliGRAM(s) Oral daily  clopidogrel Tablet 75 milliGRAM(s) Oral daily  dextrose 40% Gel 15 Gram(s) Oral once PRN  dextrose 5%. 1000 milliLiter(s) IV Continuous <Continuous>  dextrose 50% Injectable 12.5 Gram(s) IV Push once  dextrose 50% Injectable 25 Gram(s) IV Push once  dextrose 50% Injectable 25 Gram(s) IV Push once  docusate sodium 100 milliGRAM(s) Oral two times a day  finasteride 5 milliGRAM(s) Oral daily  glucagon  Injectable 1 milliGRAM(s) IntraMuscular once PRN  guaiFENesin   Syrup  (Sugar-Free) 100 milliGRAM(s) Oral every 6 hours PRN  heparin  Injectable 5000 Unit(s) SubCutaneous every 8 hours  insulin lispro (HumaLOG) corrective regimen sliding scale   SubCutaneous three times a day before meals  insulin lispro (HumaLOG) corrective regimen sliding scale   SubCutaneous at bedtime  ondansetron Injectable 4 milliGRAM(s) IV Push every 6 hours PRN  sodium chloride 0.9%. 1000 milliLiter(s) IV Continuous <Continuous>  tiotropium 18 MICROgram(s) Capsule 1 Capsule(s) Inhalation daily      Vital Signs Last 24 Hrs  T(C): 37.2 (26 Jun 2019 13:05), Max: 37.3 (25 Jun 2019 20:58)  T(F): 98.9 (26 Jun 2019 13:05), Max: 99.1 (25 Jun 2019 20:58)  HR: 94 (26 Jun 2019 13:05) (94 - 104)  BP: 127/90 (26 Jun 2019 13:05) (127/90 - 142/95)  BP(mean): --  RR: 18 (25 Jun 2019 20:58) (18 - 18)  SpO2: 100% (26 Jun 2019 13:05) (97% - 100%)    Physical Exam:  General:    NAD, non toxic  Head: atraumatic, normocephalic  Eyes: normal sclera and conjunctiva  ENT:   no oropharyngeal lesions, no LAD, neck supple  Cardio:    regular S1,S2, no murmur  Respiratory:   clear b/l, no wheezing  abd:   soft, BS +, not tender, no hepatosplenomegaly  :     no CVAT no suprapubic tenderness, no weeks  Musculoskeletal : L shoulder still edematous but not tender as yesterday, no erythema, decreased ROM  Skin:    no rash  vascular: no phlebitis, normal pulses  Neurologic:     no focal deficits  psych: normal affect                          14.0   7.57  )-----------( 154      ( 26 Jun 2019 07:21 )             44.3       06-26    139  |  99  |  15  ----------------------------<  100<H>  4.2   |  27  |  1.26    Ca    9.3      26 Jun 2019 07:21  Phos  3.1     06-26  Mg     1.9     06-26            MICROBIOLOGY:  v  BLOOD VENOUS  06-25-19 --  --  --      BLOOD PERIPHERAL  06-25-19 --  --  --      URINE MIDSTREAM  06-21-19 --  --  --      BLOOD VENOUS  06-21-19 --  --  --      BLOOD PERIPHERAL  06-21-19 --  --  --                RADIOLOGY:  Images below reviewed personally  < from: Xray Shoulder 2 Views, Left (06.25.19 @ 15:05) >  IMPRESSION:  No fractures, dislocations, or AC separation.    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     Appearance of thick acromial undersurface bony spurring which could   correlate with or predispose to a subacromial impingement process.     No destructive osseous lesions or periosteal reaction.    No periarticular soft tissue calcifications.
Follow Up:  fever    Interval History: pt had a 100.4 temp overnight and is complaining of L shoulder pain    ROS:      All other systems negative    Constitutional: no fever, no chills  Head: no trauma  Eyes: no vision changes, no eye pain  ENT:  no sore throat, no rhinorrhea  Cardiovascular:  no chest pain, no palpitation  Respiratory:  no SOB, + cough  GI:  no abd pain, no vomiting, no diarrhea  urinary: no dysuria, no hematuria, no flank pain but R back pain?  musculoskeletal:  L shoulder pain  skin:  no rash  neurology:  no headache, no seizure, no change in mental status  psych: no anxiety, no depression         Allergies  No Known Allergies        ANTIMICROBIALS:  cefTRIAXone   IVPB 1000 every 24 hours      OTHER MEDS:  acetaminophen   Tablet .. 650 milliGRAM(s) Oral every 6 hours PRN  aspirin enteric coated 81 milliGRAM(s) Oral daily  clopidogrel Tablet 75 milliGRAM(s) Oral daily  dextrose 40% Gel 15 Gram(s) Oral once PRN  dextrose 5%. 1000 milliLiter(s) IV Continuous <Continuous>  dextrose 50% Injectable 12.5 Gram(s) IV Push once  dextrose 50% Injectable 25 Gram(s) IV Push once  dextrose 50% Injectable 25 Gram(s) IV Push once  finasteride 5 milliGRAM(s) Oral daily  glucagon  Injectable 1 milliGRAM(s) IntraMuscular once PRN  guaiFENesin   Syrup  (Sugar-Free) 100 milliGRAM(s) Oral every 6 hours PRN  heparin  Injectable 5000 Unit(s) SubCutaneous every 8 hours  insulin lispro (HumaLOG) corrective regimen sliding scale   SubCutaneous three times a day before meals  insulin lispro (HumaLOG) corrective regimen sliding scale   SubCutaneous at bedtime  ondansetron Injectable 4 milliGRAM(s) IV Push every 6 hours PRN  sodium chloride 0.9%. 1000 milliLiter(s) IV Continuous <Continuous>  tiotropium 18 MICROgram(s) Capsule 1 Capsule(s) Inhalation daily      Vital Signs Last 24 Hrs  T(C): 36.8 (25 Jun 2019 06:48), Max: 38 (24 Jun 2019 20:55)  T(F): 98.2 (25 Jun 2019 06:48), Max: 100.4 (24 Jun 2019 20:55)  HR: 101 (25 Jun 2019 06:48) (94 - 103)  BP: 128/90 (25 Jun 2019 06:48) (128/90 - 141/76)  BP(mean): --  RR: 18 (25 Jun 2019 06:48) (18 - 18)  SpO2: 100% (25 Jun 2019 06:48) (97% - 100%)    Physical Exam:  General:    NAD, non toxic  Head: atraumatic, normocephalic  Eyes: normal sclera and conjunctiva  ENT:   no oropharyngeal lesions, no LAD, neck supple  Cardio:    regular S1,S2, no murmur  Respiratory:   clear b/l, no wheezing  abd:   soft, BS +, not tender, no hepatosplenomegaly  :     no CVAT no suprapubic tenderness, no weeks  Musculoskeletal : L shoulder tenderness and decreased ROM  Skin:    no rash  vascular: no phlebitis, normal pulses  Neurologic:     no focal deficits  psych: normal affect                                     13.9   9.07  )-----------( 147      ( 25 Jun 2019 06:20 )             43.9       06-25    138  |  101  |  13  ----------------------------<  113<H>  4.0   |  26  |  1.21    Ca    9.3      25 Jun 2019 06:20  Phos  2.9     06-25  Mg     1.8     06-25            MICROBIOLOGY:  v  URINE MIDSTREAM  06-21-19 --  --  --      BLOOD VENOUS  06-21-19 --  --  --      BLOOD PERIPHERAL  06-21-19 --  --  --                RADIOLOGY:  Images below reviewed personally  < from: Xray Chest 1 View- PORTABLE-Urgent (06.21.19 @ 17:14) >  IMPRESSION:  No focal patchy airspace dislocations, pleural effusions, pneumothorax,   or pulmonary edema.     Stable slightly prominent appearing cardiac and mediastinal silhouettes.     Trachea midline.    Spinal degenerative changes with broad dextrocurvature. Left shoulder   degenerative change again noted.
Follow Up:  fever    Interval History: pt stable and afebrile, still some cough and back pain    ROS:      All other systems negative    Constitutional: no fever, no chills  Head: no trauma  Eyes: no vision changes, no eye pain  ENT:  no sore throat, no rhinorrhea  Cardiovascular:  no chest pain, no palpitation  Respiratory:  no SOB, + cough  GI:  no abd pain, no vomiting, no diarrhea  urinary: no dysuria, no hematuria, no flank pain but R back pain?  musculoskeletal:  no joint pain, no joint swelling, R back pain  skin:  no rash  neurology:  no headache, no seizure, no change in mental status  psych: no anxiety, no depression         Allergies  No Known Allergies        ANTIMICROBIALS:  cefTRIAXone   IVPB 1000 every 24 hours      OTHER MEDS:  acetaminophen   Tablet .. 650 milliGRAM(s) Oral every 6 hours PRN  aspirin enteric coated 81 milliGRAM(s) Oral daily  clopidogrel Tablet 75 milliGRAM(s) Oral daily  dextrose 40% Gel 15 Gram(s) Oral once PRN  dextrose 5%. 1000 milliLiter(s) IV Continuous <Continuous>  dextrose 50% Injectable 12.5 Gram(s) IV Push once  dextrose 50% Injectable 25 Gram(s) IV Push once  dextrose 50% Injectable 25 Gram(s) IV Push once  finasteride 5 milliGRAM(s) Oral daily  glucagon  Injectable 1 milliGRAM(s) IntraMuscular once PRN  guaiFENesin   Syrup  (Sugar-Free) 100 milliGRAM(s) Oral every 6 hours PRN  heparin  Injectable 5000 Unit(s) SubCutaneous every 8 hours  insulin lispro (HumaLOG) corrective regimen sliding scale   SubCutaneous three times a day before meals  insulin lispro (HumaLOG) corrective regimen sliding scale   SubCutaneous at bedtime  ondansetron Injectable 4 milliGRAM(s) IV Push every 6 hours PRN  sodium chloride 0.9%. 1000 milliLiter(s) IV Continuous <Continuous>  tiotropium 18 MICROgram(s) Capsule 1 Capsule(s) Inhalation daily      Vital Signs Last 24 Hrs  T(C): 37.1 (24 Jun 2019 13:20), Max: 37.2 (23 Jun 2019 20:08)  T(F): 98.7 (24 Jun 2019 13:20), Max: 99 (23 Jun 2019 20:08)  HR: 94 (24 Jun 2019 13:20) (94 - 98)  BP: 141/76 (24 Jun 2019 13:20) (123/83 - 145/92)  BP(mean): --  RR: 18 (24 Jun 2019 07:09) (16 - 18)  SpO2: 97% (24 Jun 2019 13:20) (97% - 100%)    Physical Exam:  General:    NAD, non toxic  Head: atraumatic, normocephalic  Eyes: normal sclera and conjunctiva  ENT:   no oropharyngeal lesions, no LAD, neck supple  Cardio:    regular S1,S2, no murmur  Respiratory:   clear b/l, no wheezing  abd:   soft, BS +, not tender, no hepatosplenomegaly  :     no CVAT no suprapubic tenderness, no weeks  Musculoskeletal : no joint swelling, no edema  Skin:    no rash  vascular: no phlebitis, normal pulses  Neurologic:     no focal deficits  psych: normal affect                        14.7   9.31  )-----------( 138      ( 24 Jun 2019 06:10 )             46.3       06-24    139  |  100  |  12  ----------------------------<  110<H>  3.4<L>   |  29  |  1.24    Ca    9.0      24 Jun 2019 06:10  Phos  2.6     06-24  Mg     1.9     06-24            MICROBIOLOGY:  v  URINE MIDSTREAM  06-21-19 --  --  --      BLOOD VENOUS  06-21-19 --  --  --      BLOOD PERIPHERAL  06-21-19 --  --  --                RADIOLOGY:  Images below reviewed personally  < from: Xray Chest 1 View- PORTABLE-Urgent (06.21.19 @ 17:14) >  IMPRESSION:  No focal patchy airspace dislocations, pleural effusions, pneumothorax,   or pulmonary edema.     Stable slightly prominent appearing cardiac and mediastinal silhouettes.     Trachea midline.    Spinal degenerative changes with broad dextrocurvature. Left shoulder   degenerative change again noted.
SUBJECTIVE / OVERNIGHT EVENTS: pt seen and examined      MEDICATIONS  (STANDING):  aspirin enteric coated 81 milliGRAM(s) Oral daily  azithromycin  IVPB 500 milliGRAM(s) IV Intermittent every 24 hours  cefTRIAXone   IVPB 1000 milliGRAM(s) IV Intermittent every 24 hours  clopidogrel Tablet 75 milliGRAM(s) Oral daily  dextrose 5%. 1000 milliLiter(s) (50 mL/Hr) IV Continuous <Continuous>  dextrose 50% Injectable 12.5 Gram(s) IV Push once  dextrose 50% Injectable 25 Gram(s) IV Push once  dextrose 50% Injectable 25 Gram(s) IV Push once  finasteride 5 milliGRAM(s) Oral daily  heparin  Injectable 5000 Unit(s) SubCutaneous every 8 hours  insulin lispro (HumaLOG) corrective regimen sliding scale   SubCutaneous three times a day before meals  insulin lispro (HumaLOG) corrective regimen sliding scale   SubCutaneous at bedtime  sodium chloride 0.9%. 1000 milliLiter(s) (75 mL/Hr) IV Continuous <Continuous>  tiotropium 18 MICROgram(s) Capsule 1 Capsule(s) Inhalation daily    MEDICATIONS  (PRN):  acetaminophen   Tablet .. 650 milliGRAM(s) Oral every 6 hours PRN Temp greater or equal to 38C (100.4F), Moderate Pain (4 - 6)  dextrose 40% Gel 15 Gram(s) Oral once PRN Blood Glucose LESS THAN 70 milliGRAM(s)/deciliter  glucagon  Injectable 1 milliGRAM(s) IntraMuscular once PRN Glucose LESS THAN 70 milligrams/deciliter  guaiFENesin   Syrup  (Sugar-Free) 100 milliGRAM(s) Oral every 6 hours PRN Cough  ondansetron Injectable 4 milliGRAM(s) IV Push every 6 hours PRN Nausea and/or Vomiting      Vital Signs Last 24 Hrs  T(C): 37.2 (23 Jun 2019 20:08), Max: 37.3 (23 Jun 2019 14:02)  T(F): 99 (23 Jun 2019 20:08), Max: 99.1 (23 Jun 2019 14:02)  HR: 94 (23 Jun 2019 21:40) (90 - 98)  BP: 144/89 (23 Jun 2019 21:40) (123/83 - 144/89)  BP(mean): --  RR: 16 (23 Jun 2019 21:40) (16 - 18)  SpO2: 98% (23 Jun 2019 21:40) (98% - 100%)  CAPILLARY BLOOD GLUCOSE      POCT Blood Glucose.: 100 mg/dL (23 Jun 2019 22:15)  POCT Blood Glucose.: 127 mg/dL (23 Jun 2019 18:39)  POCT Blood Glucose.: 80 mg/dL (23 Jun 2019 17:57)  POCT Blood Glucose.: 73 mg/dL (23 Jun 2019 16:59)  POCT Blood Glucose.: 102 mg/dL (23 Jun 2019 11:51)  POCT Blood Glucose.: 114 mg/dL (23 Jun 2019 09:06)  POCT Blood Glucose.: 89 mg/dL (23 Jun 2019 08:18)  POCT Blood Glucose.: 57 mg/dL (23 Jun 2019 07:34)  POCT Blood Glucose.: 66 mg/dL (23 Jun 2019 07:31)  POCT Blood Glucose.: 185 mg/dL (22 Jun 2019 22:57)    I&O's Summary  PHYSICAL EXAM:  GENERAL: NAD  EYES: EOMI, PERRLA  NECK: Supple, No JVD  CHEST/LUNG: dec breath sounds rt base  HEART:  S1 , S2 +  ABDOMEN: soft , bs+  EXTREMITIES:  trace edema  NEUROLOGY:alert awake       LABS:                        15.4   11.02 )-----------( 140      ( 23 Jun 2019 06:36 )             49.0     06-23    139  |  97<L>  |  16  ----------------------------<  102<H>  3.9   |  29  |  1.37<H>    Ca    9.6      23 Jun 2019 06:36  Phos  2.4     06-23  Mg     1.8     06-23    TPro  7.4  /  Alb  3.3  /  TBili  0.8  /  DBili  x   /  AST  13  /  ALT  8   /  AlkPhos  51  06-22              RADIOLOGY & ADDITIONAL TESTS:    Imaging Personally Reviewed:    Consultant(s) Notes Reviewed:      Care Discussed with Consultants/Other Providers:
SUBJECTIVE / OVERNIGHT EVENTS: pt seen and examined    MEDICATIONS  (STANDING):  amLODIPine   Tablet 5 milliGRAM(s) Oral daily  aspirin enteric coated 81 milliGRAM(s) Oral daily  clopidogrel Tablet 75 milliGRAM(s) Oral daily  dextrose 5%. 1000 milliLiter(s) (50 mL/Hr) IV Continuous <Continuous>  dextrose 50% Injectable 12.5 Gram(s) IV Push once  dextrose 50% Injectable 25 Gram(s) IV Push once  dextrose 50% Injectable 25 Gram(s) IV Push once  docusate sodium 100 milliGRAM(s) Oral two times a day  finasteride 5 milliGRAM(s) Oral daily  heparin  Injectable 5000 Unit(s) SubCutaneous every 8 hours  insulin lispro (HumaLOG) corrective regimen sliding scale   SubCutaneous three times a day before meals  insulin lispro (HumaLOG) corrective regimen sliding scale   SubCutaneous at bedtime  polyethylene glycol 3350 17 Gram(s) Oral daily  propranolol 20 milliGRAM(s) Oral daily  senna 2 Tablet(s) Oral at bedtime  tiotropium 18 MICROgram(s) Capsule 1 Capsule(s) Inhalation daily    MEDICATIONS  (PRN):  acetaminophen   Tablet .. 650 milliGRAM(s) Oral every 6 hours PRN Temp greater or equal to 38C (100.4F), Moderate Pain (4 - 6)  bisacodyl Suppository 10 milliGRAM(s) Rectal daily PRN Constipation  dextrose 40% Gel 15 Gram(s) Oral once PRN Blood Glucose LESS THAN 70 milliGRAM(s)/deciliter  glucagon  Injectable 1 milliGRAM(s) IntraMuscular once PRN Glucose LESS THAN 70 milligrams/deciliter  guaiFENesin   Syrup  (Sugar-Free) 100 milliGRAM(s) Oral every 6 hours PRN Cough  ondansetron Injectable 4 milliGRAM(s) IV Push every 6 hours PRN Nausea and/or Vomiting    Vital Signs Last 24 Hrs  T(C): 37.2 (30 Jun 2019 20:54), Max: 37.2 (30 Jun 2019 14:09)  T(F): 98.9 (30 Jun 2019 20:54), Max: 98.9 (30 Jun 2019 14:09)  HR: 89 (30 Jun 2019 20:54) (88 - 91)  BP: 120/81 (30 Jun 2019 20:54) (119/84 - 131/84)  BP(mean): --  RR: 18 (30 Jun 2019 20:54) (18 - 18)  SpO2: 100% (30 Jun 2019 20:54) (97% - 100%)    PHYSICAL EXAM:  GENERAL: NAD  EYES: EOMI, PERRLA  NECK: Supple, No JVD  CHEST/LUNG: dec breath sounds rt base  HEART:  S1 , S2 +  ABDOMEN: soft , bs+  EXTREMITIES:  trace edema  NEUROLOGY:alert awake     LABS:        Creatinine Trend: 1.23 <--, 1.26 <--, 1.21 <--    Urine Studies:                                            Consultant(s) Notes Reviewed:      Care Discussed with Consultants/Other Providers:
SUBJECTIVE / OVERNIGHT EVENTS: pt seen and examined    MEDICATIONS  (STANDING):  amLODIPine   Tablet 5 milliGRAM(s) Oral daily  aspirin enteric coated 81 milliGRAM(s) Oral daily  clopidogrel Tablet 75 milliGRAM(s) Oral daily  dextrose 5%. 1000 milliLiter(s) (50 mL/Hr) IV Continuous <Continuous>  dextrose 50% Injectable 12.5 Gram(s) IV Push once  dextrose 50% Injectable 25 Gram(s) IV Push once  dextrose 50% Injectable 25 Gram(s) IV Push once  docusate sodium 100 milliGRAM(s) Oral two times a day  finasteride 5 milliGRAM(s) Oral daily  heparin  Injectable 5000 Unit(s) SubCutaneous every 8 hours  insulin lispro (HumaLOG) corrective regimen sliding scale   SubCutaneous three times a day before meals  insulin lispro (HumaLOG) corrective regimen sliding scale   SubCutaneous at bedtime  polyethylene glycol 3350 17 Gram(s) Oral daily  propranolol 20 milliGRAM(s) Oral daily  senna 2 Tablet(s) Oral at bedtime  tiotropium 18 MICROgram(s) Capsule 1 Capsule(s) Inhalation daily    MEDICATIONS  (PRN):  acetaminophen   Tablet .. 650 milliGRAM(s) Oral every 6 hours PRN Temp greater or equal to 38C (100.4F), Moderate Pain (4 - 6)  bisacodyl Suppository 10 milliGRAM(s) Rectal daily PRN Constipation  dextrose 40% Gel 15 Gram(s) Oral once PRN Blood Glucose LESS THAN 70 milliGRAM(s)/deciliter  glucagon  Injectable 1 milliGRAM(s) IntraMuscular once PRN Glucose LESS THAN 70 milligrams/deciliter  guaiFENesin   Syrup  (Sugar-Free) 100 milliGRAM(s) Oral every 6 hours PRN Cough  ondansetron Injectable 4 milliGRAM(s) IV Push every 6 hours PRN Nausea and/or Vomiting    Vital Signs Last 24 Hrs  T(C): 37.2 (30 Jun 2019 20:54), Max: 37.2 (30 Jun 2019 14:09)  T(F): 98.9 (30 Jun 2019 20:54), Max: 98.9 (30 Jun 2019 14:09)  HR: 89 (30 Jun 2019 20:54) (88 - 91)  BP: 120/81 (30 Jun 2019 20:54) (119/84 - 131/84)  BP(mean): --  RR: 18 (30 Jun 2019 20:54) (18 - 18)  SpO2: 100% (30 Jun 2019 20:54) (97% - 100%)    PHYSICAL EXAM:  GENERAL: NAD  EYES: EOMI, PERRLA  NECK: Supple, No JVD  CHEST/LUNG: dec breath sounds rt base  HEART:  S1 , S2 +  ABDOMEN: soft , bs+  EXTREMITIES:  trace edema  NEUROLOGY:alert awake     LABS:        Creatinine Trend: 1.23 <--, 1.26 <--, 1.21 <--, 1.24 <--    Urine Studies:                                  Consultant(s) Notes Reviewed:      Care Discussed with Consultants/Other Providers:
SUBJECTIVE / OVERNIGHT EVENTS: pt seen and examined  MEDICATIONS  (STANDING):  aspirin enteric coated 81 milliGRAM(s) Oral daily  cefTRIAXone   IVPB 1000 milliGRAM(s) IV Intermittent every 24 hours  clopidogrel Tablet 75 milliGRAM(s) Oral daily  dextrose 5%. 1000 milliLiter(s) (50 mL/Hr) IV Continuous <Continuous>  dextrose 50% Injectable 12.5 Gram(s) IV Push once  dextrose 50% Injectable 25 Gram(s) IV Push once  dextrose 50% Injectable 25 Gram(s) IV Push once  finasteride 5 milliGRAM(s) Oral daily  heparin  Injectable 5000 Unit(s) SubCutaneous every 8 hours  insulin lispro (HumaLOG) corrective regimen sliding scale   SubCutaneous three times a day before meals  insulin lispro (HumaLOG) corrective regimen sliding scale   SubCutaneous at bedtime  sodium chloride 0.9%. 1000 milliLiter(s) (75 mL/Hr) IV Continuous <Continuous>  tiotropium 18 MICROgram(s) Capsule 1 Capsule(s) Inhalation daily    MEDICATIONS  (PRN):  acetaminophen   Tablet .. 650 milliGRAM(s) Oral every 6 hours PRN Temp greater or equal to 38C (100.4F), Moderate Pain (4 - 6)  dextrose 40% Gel 15 Gram(s) Oral once PRN Blood Glucose LESS THAN 70 milliGRAM(s)/deciliter  glucagon  Injectable 1 milliGRAM(s) IntraMuscular once PRN Glucose LESS THAN 70 milligrams/deciliter  guaiFENesin   Syrup  (Sugar-Free) 100 milliGRAM(s) Oral every 6 hours PRN Cough  ondansetron Injectable 4 milliGRAM(s) IV Push every 6 hours PRN Nausea and/or Vomiting    Vital Signs Last 24 Hrs  T(C): 37.1 (2019 22:20), Max: 38 (2019 20:55)  T(F): 98.8 (2019 22:20), Max: 100.4 (2019 20:55)  HR: 95 (2019 22:20) (94 - 103)  BP: 130/87 (2019 20:55) (130/87 - 145/92)  BP(mean): --  RR: 18 (2019 20:55) (18 - 18)  SpO2: 97% (2019 20:55) (97% - 98%)    I&O's Summary  PHYSICAL EXAM:  GENERAL: NAD  EYES: EOMI, PERRLA  NECK: Supple, No JVD  CHEST/LUNG: dec breath sounds rt base  HEART:  S1 , S2 +  ABDOMEN: soft , bs+  EXTREMITIES:  trace edema  NEUROLOGY:alert awake     LABS:      139  |  100  |  12  ----------------------------<  110<H>  3.4<L>   |  29  |  1.24    Ca    9.0      2019 06:10  Phos  2.6       Mg     1.9           Creatinine Trend: 1.24 <--, 1.37 <--, 1.36 <--, 1.46 <--                        14.7   9.31  )-----------( 138      ( 2019 06:10 )             46.3     Urine Studies:  Urinalysis Basic - ( 2019 19:38 )    Color: YELLOW / Appearance: CLEAR / S.013 / pH: 6.0  Gluc: NEGATIVE / Ketone: NEGATIVE  / Bili: NEGATIVE / Urobili: NORMAL   Blood: SMALL / Protein: 20 / Nitrite: NEGATIVE   Leuk Esterase: SMALL / RBC: 0-2 / WBC 6-10   Sq Epi: OCC / Non Sq Epi:  / Bacteria: NEGATIVE                    Consultant(s) Notes Reviewed:      Care Discussed with Consultants/Other Providers:
SUBJECTIVE / OVERNIGHT EVENTS: pt seen and examined  c/o constipation + flatus    MEDICATIONS  (STANDING):  aspirin enteric coated 81 milliGRAM(s) Oral daily  clopidogrel Tablet 75 milliGRAM(s) Oral daily  dextrose 5%. 1000 milliLiter(s) (50 mL/Hr) IV Continuous <Continuous>  dextrose 50% Injectable 12.5 Gram(s) IV Push once  dextrose 50% Injectable 25 Gram(s) IV Push once  dextrose 50% Injectable 25 Gram(s) IV Push once  docusate sodium 100 milliGRAM(s) Oral two times a day  finasteride 5 milliGRAM(s) Oral daily  heparin  Injectable 5000 Unit(s) SubCutaneous every 8 hours  insulin lispro (HumaLOG) corrective regimen sliding scale   SubCutaneous three times a day before meals  insulin lispro (HumaLOG) corrective regimen sliding scale   SubCutaneous at bedtime  senna 2 Tablet(s) Oral at bedtime  sodium chloride 0.9%. 1000 milliLiter(s) (75 mL/Hr) IV Continuous <Continuous>  tiotropium 18 MICROgram(s) Capsule 1 Capsule(s) Inhalation daily    MEDICATIONS  (PRN):  acetaminophen   Tablet .. 650 milliGRAM(s) Oral every 6 hours PRN Temp greater or equal to 38C (100.4F), Moderate Pain (4 - 6)  bisacodyl Suppository 10 milliGRAM(s) Rectal daily PRN Constipation  dextrose 40% Gel 15 Gram(s) Oral once PRN Blood Glucose LESS THAN 70 milliGRAM(s)/deciliter  glucagon  Injectable 1 milliGRAM(s) IntraMuscular once PRN Glucose LESS THAN 70 milligrams/deciliter  guaiFENesin   Syrup  (Sugar-Free) 100 milliGRAM(s) Oral every 6 hours PRN Cough  ondansetron Injectable 4 milliGRAM(s) IV Push every 6 hours PRN Nausea and/or Vomiting    Vital Signs Last 24 Hrs  T(C): 36.8 (2019 13:15), Max: 37.3 (2019 20:51)  T(F): 98.2 (2019 13:15), Max: 99.1 (2019 20:51)  HR: 93 (2019 13:15) (90 - 93)  BP: 153/93 (2019 13:15) (127/90 - 153/93)  BP(mean): --  RR: 18 (2019 06:31) (18 - 20)  SpO2: 100% (2019 13:15) (100% - 100%)  PHYSICAL EXAM:  GENERAL: NAD  EYES: EOMI, PERRLA  NECK: Supple, No JVD  CHEST/LUNG: dec breath sounds rt base  HEART:  S1 , S2 +  ABDOMEN: soft , bs+  EXTREMITIES:  trace edema  NEUROLOGY:alert awake     LABS:      139  |  99  |  15  ----------------------------<  100<H>  4.2   |  27  |  1.26    Ca    9.3      2019 07:21  Phos  3.1       Mg     1.9           Creatinine Trend: 1.26 <--, 1.21 <--, 1.24 <--, 1.37 <--, 1.36 <--, 1.46 <--                        14.0   7.57  )-----------( 154      ( 2019 07:21 )             44.3     Urine Studies:  Urinalysis Basic - ( 2019 19:38 )    Color: YELLOW / Appearance: CLEAR / S.013 / pH: 6.0  Gluc: NEGATIVE / Ketone: NEGATIVE  / Bili: NEGATIVE / Urobili: NORMAL   Blood: SMALL / Protein: 20 / Nitrite: NEGATIVE   Leuk Esterase: SMALL / RBC: 0-2 / WBC 6-10   Sq Epi: OCC / Non Sq Epi:  / Bacteria: NEGATIVE                            Consultant(s) Notes Reviewed:      Care Discussed with Consultants/Other Providers:
SUBJECTIVE / OVERNIGHT EVENTS: pt seen and examined  c/o constipation + flatus    MEDICATIONS  (STANDING):  aspirin enteric coated 81 milliGRAM(s) Oral daily  clopidogrel Tablet 75 milliGRAM(s) Oral daily  dextrose 5%. 1000 milliLiter(s) (50 mL/Hr) IV Continuous <Continuous>  dextrose 50% Injectable 12.5 Gram(s) IV Push once  dextrose 50% Injectable 25 Gram(s) IV Push once  dextrose 50% Injectable 25 Gram(s) IV Push once  docusate sodium 100 milliGRAM(s) Oral two times a day  finasteride 5 milliGRAM(s) Oral daily  heparin  Injectable 5000 Unit(s) SubCutaneous every 8 hours  insulin lispro (HumaLOG) corrective regimen sliding scale   SubCutaneous three times a day before meals  insulin lispro (HumaLOG) corrective regimen sliding scale   SubCutaneous at bedtime  senna 2 Tablet(s) Oral at bedtime  sodium chloride 0.9%. 1000 milliLiter(s) (75 mL/Hr) IV Continuous <Continuous>  tiotropium 18 MICROgram(s) Capsule 1 Capsule(s) Inhalation daily    MEDICATIONS  (PRN):  acetaminophen   Tablet .. 650 milliGRAM(s) Oral every 6 hours PRN Temp greater or equal to 38C (100.4F), Moderate Pain (4 - 6)  bisacodyl Suppository 10 milliGRAM(s) Rectal daily PRN Constipation  dextrose 40% Gel 15 Gram(s) Oral once PRN Blood Glucose LESS THAN 70 milliGRAM(s)/deciliter  glucagon  Injectable 1 milliGRAM(s) IntraMuscular once PRN Glucose LESS THAN 70 milligrams/deciliter  guaiFENesin   Syrup  (Sugar-Free) 100 milliGRAM(s) Oral every 6 hours PRN Cough  ondansetron Injectable 4 milliGRAM(s) IV Push every 6 hours PRN Nausea and/or Vomiting    Vital Signs Last 24 Hrs  T(C): 37.3 (2019 20:51), Max: 37.3 (2019 20:51)  T(F): 99.1 (2019 20:51), Max: 99.1 (2019 20:51)  HR: 92 (2019 20:51) (92 - 94)  BP: 133/89 (2019 20:51) (127/90 - 133/89)  BP(mean): --  RR: 20 (2019 20:51) (20 - 20)  SpO2: 100% (2019 20:51) (100% - 100%)    PHYSICAL EXAM:  GENERAL: NAD  EYES: EOMI, PERRLA  NECK: Supple, No JVD  CHEST/LUNG: dec breath sounds rt base  HEART:  S1 , S2 +  ABDOMEN: soft , bs+  EXTREMITIES:  trace edema  NEUROLOGY:alert awake     LABS:      139  |  99  |  15  ----------------------------<  100<H>  4.2   |  27  |  1.26    Ca    9.3      2019 07:21  Phos  3.1       Mg     1.9           Creatinine Trend: 1.26 <--, 1.21 <--, 1.24 <--, 1.37 <--, 1.36 <--, 1.46 <--                        14.0   7.57  )-----------( 154      ( 2019 07:21 )             44.3     Urine Studies:  Urinalysis Basic - ( 2019 19:38 )    Color: YELLOW / Appearance: CLEAR / S.013 / pH: 6.0  Gluc: NEGATIVE / Ketone: NEGATIVE  / Bili: NEGATIVE / Urobili: NORMAL   Blood: SMALL / Protein: 20 / Nitrite: NEGATIVE   Leuk Esterase: SMALL / RBC: 0-2 / WBC 6-10   Sq Epi: OCC / Non Sq Epi:  / Bacteria: NEGATIVE                    Color: YELLOW / Appearance: CLEAR / S.013 / pH: 6.0  Gluc: NEGATIVE / Ketone: NEGATIVE  / Bili: NEGATIVE / Urobili: NORMAL   Blood: SMALL / Protein: 20 / Nitrite: NEGATIVE   Leuk Esterase: SMALL / RBC: 0-2 / WBC 6-10   Sq Epi: OCC / Non Sq Epi:  / Bacteria: NEGATIVE                            Consultant(s) Notes Reviewed:      Care Discussed with Consultants/Other Providers:
pt was evaluated by hospitalist earlier during  the day
SUBJECTIVE / OVERNIGHT EVENTS: pt seen and examined  c/o constipation + flatus    MEDICATIONS  (STANDING):  amLODIPine   Tablet 5 milliGRAM(s) Oral daily  aspirin enteric coated 81 milliGRAM(s) Oral daily  clopidogrel Tablet 75 milliGRAM(s) Oral daily  dextrose 5%. 1000 milliLiter(s) (50 mL/Hr) IV Continuous <Continuous>  dextrose 50% Injectable 12.5 Gram(s) IV Push once  dextrose 50% Injectable 25 Gram(s) IV Push once  dextrose 50% Injectable 25 Gram(s) IV Push once  docusate sodium 100 milliGRAM(s) Oral two times a day  finasteride 5 milliGRAM(s) Oral daily  heparin  Injectable 5000 Unit(s) SubCutaneous every 8 hours  insulin lispro (HumaLOG) corrective regimen sliding scale   SubCutaneous three times a day before meals  insulin lispro (HumaLOG) corrective regimen sliding scale   SubCutaneous at bedtime  polyethylene glycol 3350 17 Gram(s) Oral daily  propranolol 20 milliGRAM(s) Oral daily  senna 2 Tablet(s) Oral at bedtime  tiotropium 18 MICROgram(s) Capsule 1 Capsule(s) Inhalation daily    MEDICATIONS  (PRN):  acetaminophen   Tablet .. 650 milliGRAM(s) Oral every 6 hours PRN Temp greater or equal to 38C (100.4F), Moderate Pain (4 - 6)  bisacodyl Suppository 10 milliGRAM(s) Rectal daily PRN Constipation  dextrose 40% Gel 15 Gram(s) Oral once PRN Blood Glucose LESS THAN 70 milliGRAM(s)/deciliter  glucagon  Injectable 1 milliGRAM(s) IntraMuscular once PRN Glucose LESS THAN 70 milligrams/deciliter  guaiFENesin   Syrup  (Sugar-Free) 100 milliGRAM(s) Oral every 6 hours PRN Cough  ondansetron Injectable 4 milliGRAM(s) IV Push every 6 hours PRN Nausea and/or Vomiting    Vital Signs Last 24 Hrs  T(C): 37.1 (28 Jun 2019 21:32), Max: 37.1 (28 Jun 2019 21:32)  T(F): 98.8 (28 Jun 2019 21:32), Max: 98.8 (28 Jun 2019 21:32)  HR: 88 (28 Jun 2019 21:32) (86 - 88)  BP: 120/78 (28 Jun 2019 21:32) (110/83 - 140/94)  BP(mean): --  RR: 18 (28 Jun 2019 21:32) (18 - 18)  SpO2: 100% (28 Jun 2019 21:32) (96% - 100%)    PHYSICAL EXAM:  GENERAL: NAD  EYES: EOMI, PERRLA  NECK: Supple, No JVD  CHEST/LUNG: dec breath sounds rt base  HEART:  S1 , S2 +  ABDOMEN: soft , bs+  EXTREMITIES:  trace edema  NEUROLOGY:alert awake     LABS:  06-28    139  |  100  |  16  ----------------------------<  114<H>  4.4   |  28  |  1.23    Ca    10.3      28 Jun 2019 06:00  Mg     2.1     06-28      Creatinine Trend: 1.23 <--, 1.26 <--, 1.21 <--, 1.24 <--, 1.37 <--, 1.36 <--                        15.0   9.55  )-----------( 193      ( 28 Jun 2019 06:00 )             47.1     Urine Studies:                                  Consultant(s) Notes Reviewed:      Care Discussed with Consultants/Other Providers:
SUBJECTIVE / OVERNIGHT EVENTS: pt seen and examined    MEDICATIONS  (STANDING):  amLODIPine   Tablet 5 milliGRAM(s) Oral daily  aspirin enteric coated 81 milliGRAM(s) Oral daily  clopidogrel Tablet 75 milliGRAM(s) Oral daily  dextrose 5%. 1000 milliLiter(s) (50 mL/Hr) IV Continuous <Continuous>  dextrose 50% Injectable 12.5 Gram(s) IV Push once  dextrose 50% Injectable 25 Gram(s) IV Push once  dextrose 50% Injectable 25 Gram(s) IV Push once  docusate sodium 100 milliGRAM(s) Oral two times a day  finasteride 5 milliGRAM(s) Oral daily  heparin  Injectable 5000 Unit(s) SubCutaneous every 8 hours  insulin lispro (HumaLOG) corrective regimen sliding scale   SubCutaneous three times a day before meals  insulin lispro (HumaLOG) corrective regimen sliding scale   SubCutaneous at bedtime  polyethylene glycol 3350 17 Gram(s) Oral daily  propranolol 20 milliGRAM(s) Oral daily  senna 2 Tablet(s) Oral at bedtime  tiotropium 18 MICROgram(s) Capsule 1 Capsule(s) Inhalation daily    MEDICATIONS  (PRN):  acetaminophen   Tablet .. 650 milliGRAM(s) Oral every 6 hours PRN Temp greater or equal to 38C (100.4F), Moderate Pain (4 - 6)  bisacodyl Suppository 10 milliGRAM(s) Rectal daily PRN Constipation  dextrose 40% Gel 15 Gram(s) Oral once PRN Blood Glucose LESS THAN 70 milliGRAM(s)/deciliter  glucagon  Injectable 1 milliGRAM(s) IntraMuscular once PRN Glucose LESS THAN 70 milligrams/deciliter  guaiFENesin   Syrup  (Sugar-Free) 100 milliGRAM(s) Oral every 6 hours PRN Cough  ondansetron Injectable 4 milliGRAM(s) IV Push every 6 hours PRN Nausea and/or Vomiting    Vital Signs Last 24 Hrs  T(C): 36.7 (29 Jun 2019 14:31), Max: 37.1 (28 Jun 2019 21:32)  T(F): 98.1 (29 Jun 2019 14:31), Max: 98.8 (28 Jun 2019 21:32)  HR: 87 (29 Jun 2019 14:31) (87 - 90)  BP: 120/80 (29 Jun 2019 14:31) (120/78 - 123/88)  BP(mean): --  RR: 17 (29 Jun 2019 14:31) (17 - 18)  SpO2: 100% (29 Jun 2019 14:31) (100% - 100%)    PHYSICAL EXAM:  GENERAL: NAD  EYES: EOMI, PERRLA  NECK: Supple, No JVD  CHEST/LUNG: dec breath sounds rt base  HEART:  S1 , S2 +  ABDOMEN: soft , bs+  EXTREMITIES:  trace edema  NEUROLOGY:alert awake     LABS:  06-28    139  |  100  |  16  ----------------------------<  114<H>  4.4   |  28  |  1.23    Ca    10.3      28 Jun 2019 06:00  Mg     2.1     06-28      Creatinine Trend: 1.23 <--, 1.26 <--, 1.21 <--, 1.24 <--, 1.37 <--                        15.0   9.55  )-----------( 193      ( 28 Jun 2019 06:00 )             47.1     Urine Studies:                                      Consultant(s) Notes Reviewed:      Care Discussed with Consultants/Other Providers:
SUBJECTIVE / OVERNIGHT EVENTS: pt seen and examined    MEDICATIONS  (STANDING):  aspirin enteric coated 81 milliGRAM(s) Oral daily  cefTRIAXone   IVPB 1000 milliGRAM(s) IV Intermittent every 24 hours  clopidogrel Tablet 75 milliGRAM(s) Oral daily  dextrose 5%. 1000 milliLiter(s) (50 mL/Hr) IV Continuous <Continuous>  dextrose 50% Injectable 12.5 Gram(s) IV Push once  dextrose 50% Injectable 25 Gram(s) IV Push once  dextrose 50% Injectable 25 Gram(s) IV Push once  docusate sodium 100 milliGRAM(s) Oral two times a day  finasteride 5 milliGRAM(s) Oral daily  heparin  Injectable 5000 Unit(s) SubCutaneous every 8 hours  insulin lispro (HumaLOG) corrective regimen sliding scale   SubCutaneous three times a day before meals  insulin lispro (HumaLOG) corrective regimen sliding scale   SubCutaneous at bedtime  sodium chloride 0.9%. 1000 milliLiter(s) (75 mL/Hr) IV Continuous <Continuous>  tiotropium 18 MICROgram(s) Capsule 1 Capsule(s) Inhalation daily    MEDICATIONS  (PRN):  acetaminophen   Tablet .. 650 milliGRAM(s) Oral every 6 hours PRN Temp greater or equal to 38C (100.4F), Moderate Pain (4 - 6)  dextrose 40% Gel 15 Gram(s) Oral once PRN Blood Glucose LESS THAN 70 milliGRAM(s)/deciliter  glucagon  Injectable 1 milliGRAM(s) IntraMuscular once PRN Glucose LESS THAN 70 milligrams/deciliter  guaiFENesin   Syrup  (Sugar-Free) 100 milliGRAM(s) Oral every 6 hours PRN Cough  ondansetron Injectable 4 milliGRAM(s) IV Push every 6 hours PRN Nausea and/or Vomiting    Vital Signs Last 24 Hrs  T(C): 37.3 (2019 20:58), Max: 37.3 (2019 20:58)  T(F): 99.1 (2019 20:58), Max: 99.1 (2019 20:58)  HR: 102 (2019 20:58) (101 - 104)  BP: 142/95 (2019 20:58) (128/90 - 142/95)  BP(mean): --  RR: 18 (2019 20:58) (18 - 18)  SpO2: 100% (2019 20:58) (97% - 100%)    I&O's Summary  PHYSICAL EXAM:  GENERAL: NAD  EYES: EOMI, PERRLA  NECK: Supple, No JVD  CHEST/LUNG: dec breath sounds rt base  HEART:  S1 , S2 +  ABDOMEN: soft , bs+  EXTREMITIES:  trace edema  NEUROLOGY:alert awake     LABS:      138  |  101  |  13  ----------------------------<  113<H>  4.0   |  26  |  1.21    Ca    9.3      2019 06:20  Phos  2.9       Mg     1.8           Creatinine Trend: 1.21 <--, 1.24 <--, 1.37 <--, 1.36 <--, 1.46 <--                        13.9   9.07  )-----------( 147      ( 2019 06:20 )             43.9     Urine Studies:  Urinalysis Basic - ( 2019 19:38 )    Color: YELLOW / Appearance: CLEAR / S.013 / pH: 6.0  Gluc: NEGATIVE / Ketone: NEGATIVE  / Bili: NEGATIVE / Urobili: NORMAL   Blood: SMALL / Protein: 20 / Nitrite: NEGATIVE   Leuk Esterase: SMALL / RBC: 0-2 / WBC 6-10   Sq Epi: OCC / Non Sq Epi:  / Bacteria: NEGATIVE                            Consultant(s) Notes Reviewed:      Care Discussed with Consultants/Other Providers:

## 2019-07-01 NOTE — PROGRESS NOTE ADULT - PROBLEM SELECTOR PROBLEM 5
CKD (chronic kidney disease), stage III

## 2019-07-01 NOTE — PROGRESS NOTE ADULT - PROBLEM SELECTOR PLAN 3
- Monitor BP. Holding meds overnight in setting of sepsis   - Restart home meds in AM if needed

## 2019-07-01 NOTE — PROGRESS NOTE ADULT - PROBLEM SELECTOR PLAN 7
-
-
- Pharmacist consulted for med rec  - Restart home meds when completed
-
- Pharmacist consulted for med rec  - Restart home meds when completed
- Pharmacist consulted for med rec  - Restart home meds when completed

## 2019-07-01 NOTE — PROGRESS NOTE ADULT - PROBLEM SELECTOR PLAN 6
- Currently rate controlled.
- Pt with reported h/o "arrhythmia"  - Suspect this is chronic A fib. Pt not on AC   - Currently rate controlled. Will obtain further history in AM
- Currently rate controlled.
- Pt with reported h/o "arrhythmia"  - Suspect this is chronic A fib. Pt not on AC   - Currently rate controlled. Will obtain further history in AM
- Pt with reported h/o "arrhythmia"  - Suspect this is chronic A fib. Pt not on AC   - Currently rate controlled. Will obtain further history in AM
- Currently rate controlled.

## 2019-07-01 NOTE — PROGRESS NOTE ADULT - PROBLEM SELECTOR PROBLEM 7
Medication management

## 2019-07-01 NOTE — PROGRESS NOTE ADULT - PROBLEM SELECTOR PLAN 1
- Pt presenting with cough, crackles noted on exam, septic. Suspect underlying PNA, though CXR neg  - RVP neg  -abx as per ID
- Pt presenting with cough, crackles noted on exam, septic. Suspect underlying PNA, though CXR neg  - RVP neg  OFF ABX as per id
- Pt presenting with cough, crackles noted on exam, septic. Suspect underlying PNA, though CXR neg  - RVP neg  OFF ABX as per id
- Pt presenting with cough, crackles noted on exam, septic. Suspect underlying PNA, though CXR neg  - RVP neg  s/p abx
- Pt presenting with cough, crackles noted on exam, septic. Suspect underlying PNA, though CXR neg  - RVP neg  OFF ABX as per id
- Pt presenting with cough, crackles noted on exam, septic. Suspect underlying PNA, though CXR neg  - RVP neg  -abx as per ID
- Pt presenting with cough, crackles noted on exam, septic. Suspect underlying PNA, though CXR neg  - RVP neg  OFF ABX as per id

## 2019-07-02 ENCOUNTER — TRANSCRIPTION ENCOUNTER (OUTPATIENT)
Age: 84
End: 2019-07-02

## 2019-07-02 VITALS
HEART RATE: 76 BPM | DIASTOLIC BLOOD PRESSURE: 88 MMHG | SYSTOLIC BLOOD PRESSURE: 111 MMHG | OXYGEN SATURATION: 95 % | RESPIRATION RATE: 16 BRPM | TEMPERATURE: 98 F

## 2019-07-02 LAB
GLUCOSE BLDC GLUCOMTR-MCNC: 110 MG/DL — HIGH (ref 70–99)
GLUCOSE BLDC GLUCOMTR-MCNC: 111 MG/DL — HIGH (ref 70–99)

## 2019-07-02 RX ORDER — SENNA PLUS 8.6 MG/1
2 TABLET ORAL
Qty: 0 | Refills: 0 | DISCHARGE
Start: 2019-07-02

## 2019-07-02 RX ORDER — TIOTROPIUM BROMIDE 18 UG/1
1 CAPSULE ORAL; RESPIRATORY (INHALATION)
Qty: 0 | Refills: 0 | DISCHARGE
Start: 2019-07-02

## 2019-07-02 RX ORDER — ACETAMINOPHEN 500 MG
2 TABLET ORAL
Qty: 0 | Refills: 0 | DISCHARGE
Start: 2019-07-02

## 2019-07-02 RX ORDER — CHOLECALCIFEROL (VITAMIN D3) 125 MCG
2 CAPSULE ORAL
Qty: 0 | Refills: 0 | DISCHARGE

## 2019-07-02 RX ORDER — GABAPENTIN 400 MG/1
1 CAPSULE ORAL
Qty: 0 | Refills: 0 | DISCHARGE

## 2019-07-02 RX ORDER — POLYETHYLENE GLYCOL 3350 17 G/17G
17 POWDER, FOR SOLUTION ORAL
Qty: 0 | Refills: 0 | DISCHARGE
Start: 2019-07-02

## 2019-07-02 RX ORDER — DOCUSATE SODIUM 100 MG
1 CAPSULE ORAL
Qty: 0 | Refills: 0 | DISCHARGE
Start: 2019-07-02

## 2019-07-02 RX ADMIN — Medication 81 MILLIGRAM(S): at 11:13

## 2019-07-02 RX ADMIN — POLYETHYLENE GLYCOL 3350 17 GRAM(S): 17 POWDER, FOR SOLUTION ORAL at 11:14

## 2019-07-02 RX ADMIN — HEPARIN SODIUM 5000 UNIT(S): 5000 INJECTION INTRAVENOUS; SUBCUTANEOUS at 13:32

## 2019-07-02 RX ADMIN — Medication 20 MILLIGRAM(S): at 06:39

## 2019-07-02 RX ADMIN — Medication 100 MILLIGRAM(S): at 11:14

## 2019-07-02 RX ADMIN — FINASTERIDE 5 MILLIGRAM(S): 5 TABLET, FILM COATED ORAL at 11:13

## 2019-07-02 RX ADMIN — CLOPIDOGREL BISULFATE 75 MILLIGRAM(S): 75 TABLET, FILM COATED ORAL at 11:13

## 2019-07-02 RX ADMIN — AMLODIPINE BESYLATE 5 MILLIGRAM(S): 2.5 TABLET ORAL at 06:39

## 2019-07-02 RX ADMIN — HEPARIN SODIUM 5000 UNIT(S): 5000 INJECTION INTRAVENOUS; SUBCUTANEOUS at 06:39

## 2019-07-02 RX ADMIN — Medication 100 MILLIGRAM(S): at 06:39

## 2019-07-02 RX ADMIN — TIOTROPIUM BROMIDE 1 CAPSULE(S): 18 CAPSULE ORAL; RESPIRATORY (INHALATION) at 11:13

## 2019-07-02 NOTE — DISCHARGE NOTE NURSING/CASE MANAGEMENT/SOCIAL WORK - NSDCDPATPORTLINK_GEN_ALL_CORE
You can access the [facility] Patient Portal, offered by [facility], by registering with the following website: [email or web address]

## 2019-08-19 NOTE — DIETITIAN INITIAL EVALUATION ADULT. - EST PROTEIN NEEDS3
Anesthesia Evaluation     Patient summary reviewed and Nursing notes reviewed   no history of anesthetic complications:  NPO Solid Status: > 6 hours  NPO Liquid Status: > 2 hours           Airway   Mallampati: II  TM distance: >3 FB  Neck ROM: full  No difficulty expected  Dental - normal exam     Pulmonary - normal exam   (-) not a smoker  Cardiovascular - normal exam    (-) angina      Neuro/Psych  (+) seizures (unknown if sz disorder undergoing 3 day EEG starting tomorrow), psychiatric history (ADD),     GI/Hepatic/Renal/Endo      ROS Comment: Screening colonoscopy F/H polyps    Musculoskeletal     Abdominal    Substance History      OB/GYN          Other                        Anesthesia Plan    ASA 2     MAC     Anesthetic plan, all risks, benefits, and alternatives have been provided, discussed and informed consent has been obtained with: patient.       56.25
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
## 2019-11-06 ENCOUNTER — INPATIENT (INPATIENT)
Facility: HOSPITAL | Age: 84
LOS: 9 days | Discharge: NURSING, REHAB OR LONG-TERM CARE | End: 2019-11-16
Attending: INTERNAL MEDICINE | Admitting: INTERNAL MEDICINE
Payer: MEDICARE

## 2019-11-06 VITALS
TEMPERATURE: 98 F | DIASTOLIC BLOOD PRESSURE: 70 MMHG | OXYGEN SATURATION: 96 % | SYSTOLIC BLOOD PRESSURE: 117 MMHG | RESPIRATION RATE: 98 BRPM | WEIGHT: 162.04 LBS | HEART RATE: 90 BPM

## 2019-11-06 DIAGNOSIS — R06.02 SHORTNESS OF BREATH: ICD-10-CM

## 2019-11-06 DIAGNOSIS — E11.9 TYPE 2 DIABETES MELLITUS WITHOUT COMPLICATIONS: ICD-10-CM

## 2019-11-06 DIAGNOSIS — I48.20 CHRONIC ATRIAL FIBRILLATION, UNSPECIFIED: ICD-10-CM

## 2019-11-06 DIAGNOSIS — I10 ESSENTIAL (PRIMARY) HYPERTENSION: ICD-10-CM

## 2019-11-06 DIAGNOSIS — N19 UNSPECIFIED KIDNEY FAILURE: ICD-10-CM

## 2019-11-06 LAB
ALBUMIN SERPL ELPH-MCNC: 3 G/DL — LOW (ref 3.3–5)
ALP SERPL-CCNC: 45 U/L — SIGNIFICANT CHANGE UP (ref 40–120)
ALT FLD-CCNC: 14 U/L — SIGNIFICANT CHANGE UP (ref 12–78)
ANION GAP SERPL CALC-SCNC: 6 MMOL/L — SIGNIFICANT CHANGE UP (ref 5–17)
APPEARANCE UR: ABNORMAL
APTT BLD: 24.3 SEC — LOW (ref 28.5–37)
AST SERPL-CCNC: 13 U/L — LOW (ref 15–37)
BACTERIA # UR AUTO: ABNORMAL
BASE EXCESS BLDA CALC-SCNC: -2.9 MMOL/L — LOW (ref -2–2)
BASOPHILS # BLD AUTO: 0.03 K/UL — SIGNIFICANT CHANGE UP (ref 0–0.2)
BASOPHILS NFR BLD AUTO: 0.3 % — SIGNIFICANT CHANGE UP (ref 0–2)
BILIRUB SERPL-MCNC: 0.4 MG/DL — SIGNIFICANT CHANGE UP (ref 0.2–1.2)
BILIRUB UR-MCNC: NEGATIVE — SIGNIFICANT CHANGE UP
BLOOD GAS COMMENTS: SIGNIFICANT CHANGE UP
BLOOD GAS COMMENTS: SIGNIFICANT CHANGE UP
BLOOD GAS SOURCE: SIGNIFICANT CHANGE UP
BUN SERPL-MCNC: 33 MG/DL — HIGH (ref 7–23)
CALCIUM SERPL-MCNC: 9.1 MG/DL — SIGNIFICANT CHANGE UP (ref 8.5–10.1)
CHLORIDE SERPL-SCNC: 107 MMOL/L — SIGNIFICANT CHANGE UP (ref 96–108)
CK MB BLD-MCNC: <2.7 % — SIGNIFICANT CHANGE UP (ref 0–3.5)
CK MB CFR SERPL CALC: <1 NG/ML — SIGNIFICANT CHANGE UP (ref 0.5–3.6)
CK SERPL-CCNC: 37 U/L — SIGNIFICANT CHANGE UP (ref 26–308)
CO2 SERPL-SCNC: 25 MMOL/L — SIGNIFICANT CHANGE UP (ref 22–31)
COLOR SPEC: YELLOW — SIGNIFICANT CHANGE UP
CREAT SERPL-MCNC: 1.74 MG/DL — HIGH (ref 0.5–1.3)
D DIMER BLD IA.RAPID-MCNC: 2557 NG/ML DDU — HIGH
DIFF PNL FLD: ABNORMAL
EOSINOPHIL # BLD AUTO: 0.02 K/UL — SIGNIFICANT CHANGE UP (ref 0–0.5)
EOSINOPHIL NFR BLD AUTO: 0.2 % — SIGNIFICANT CHANGE UP (ref 0–6)
EPI CELLS # UR: ABNORMAL
FLU A RESULT: SIGNIFICANT CHANGE UP
FLU A RESULT: SIGNIFICANT CHANGE UP
FLUAV AG NPH QL: SIGNIFICANT CHANGE UP
FLUBV AG NPH QL: SIGNIFICANT CHANGE UP
GLUCOSE SERPL-MCNC: 116 MG/DL — HIGH (ref 70–99)
GLUCOSE UR QL: NEGATIVE MG/DL — SIGNIFICANT CHANGE UP
HCO3 BLDA-SCNC: 20 MMOL/L — LOW (ref 21–29)
HCT VFR BLD CALC: 40.7 % — SIGNIFICANT CHANGE UP (ref 39–50)
HGB BLD-MCNC: 12.8 G/DL — LOW (ref 13–17)
HOROWITZ INDEX BLDA+IHG-RTO: 21 — SIGNIFICANT CHANGE UP
IMM GRANULOCYTES NFR BLD AUTO: 0.4 % — SIGNIFICANT CHANGE UP (ref 0–1.5)
INR BLD: 1.22 RATIO — HIGH (ref 0.88–1.16)
KETONES UR-MCNC: NEGATIVE — SIGNIFICANT CHANGE UP
LACTATE SERPL-SCNC: 1.6 MMOL/L — SIGNIFICANT CHANGE UP (ref 0.7–2)
LACTATE SERPL-SCNC: 2.4 MMOL/L — HIGH (ref 0.7–2)
LEUKOCYTE ESTERASE UR-ACNC: ABNORMAL
LYMPHOCYTES # BLD AUTO: 1.42 K/UL — SIGNIFICANT CHANGE UP (ref 1–3.3)
LYMPHOCYTES # BLD AUTO: 15.1 % — SIGNIFICANT CHANGE UP (ref 13–44)
MCHC RBC-ENTMCNC: 28.5 PG — SIGNIFICANT CHANGE UP (ref 27–34)
MCHC RBC-ENTMCNC: 31.4 GM/DL — LOW (ref 32–36)
MCV RBC AUTO: 90.6 FL — SIGNIFICANT CHANGE UP (ref 80–100)
MONOCYTES # BLD AUTO: 0.99 K/UL — HIGH (ref 0–0.9)
MONOCYTES NFR BLD AUTO: 10.6 % — SIGNIFICANT CHANGE UP (ref 2–14)
NEUTROPHILS # BLD AUTO: 6.88 K/UL — SIGNIFICANT CHANGE UP (ref 1.8–7.4)
NEUTROPHILS NFR BLD AUTO: 73.4 % — SIGNIFICANT CHANGE UP (ref 43–77)
NITRITE UR-MCNC: POSITIVE
NRBC # BLD: 0 /100 WBCS — SIGNIFICANT CHANGE UP (ref 0–0)
NT-PROBNP SERPL-SCNC: 1062 PG/ML — HIGH (ref 0–450)
PCO2 BLDA: 30 MMHG — LOW (ref 32–46)
PH BLD: 7.43 — SIGNIFICANT CHANGE UP (ref 7.35–7.45)
PH UR: 5 — SIGNIFICANT CHANGE UP (ref 5–8)
PLATELET # BLD AUTO: 192 K/UL — SIGNIFICANT CHANGE UP (ref 150–400)
PO2 BLDA: 97 MMHG — SIGNIFICANT CHANGE UP (ref 74–108)
POTASSIUM SERPL-MCNC: 4 MMOL/L — SIGNIFICANT CHANGE UP (ref 3.5–5.3)
POTASSIUM SERPL-SCNC: 4 MMOL/L — SIGNIFICANT CHANGE UP (ref 3.5–5.3)
PROT SERPL-MCNC: 8.4 GM/DL — HIGH (ref 6–8.3)
PROT UR-MCNC: 30 MG/DL
PROTHROM AB SERPL-ACNC: 13.8 SEC — HIGH (ref 10–12.9)
RBC # BLD: 4.49 M/UL — SIGNIFICANT CHANGE UP (ref 4.2–5.8)
RBC # FLD: 13.8 % — SIGNIFICANT CHANGE UP (ref 10.3–14.5)
RSV RESULT: SIGNIFICANT CHANGE UP
RSV RNA RESP QL NAA+PROBE: SIGNIFICANT CHANGE UP
SAO2 % BLDA: 98 % — HIGH (ref 92–96)
SODIUM SERPL-SCNC: 138 MMOL/L — SIGNIFICANT CHANGE UP (ref 135–145)
SP GR SPEC: 1.01 — SIGNIFICANT CHANGE UP (ref 1.01–1.02)
TROPONIN I SERPL-MCNC: <.015 NG/ML — SIGNIFICANT CHANGE UP (ref 0.01–0.04)
UROBILINOGEN FLD QL: NEGATIVE MG/DL — SIGNIFICANT CHANGE UP
WBC # BLD: 9.38 K/UL — SIGNIFICANT CHANGE UP (ref 3.8–10.5)
WBC # FLD AUTO: 9.38 K/UL — SIGNIFICANT CHANGE UP (ref 3.8–10.5)
WBC UR QL: >50

## 2019-11-06 PROCEDURE — 93010 ELECTROCARDIOGRAM REPORT: CPT

## 2019-11-06 PROCEDURE — 71045 X-RAY EXAM CHEST 1 VIEW: CPT | Mod: 26

## 2019-11-06 PROCEDURE — 71275 CT ANGIOGRAPHY CHEST: CPT | Mod: 26

## 2019-11-06 PROCEDURE — 99285 EMERGENCY DEPT VISIT HI MDM: CPT

## 2019-11-06 PROCEDURE — 76775 US EXAM ABDO BACK WALL LIM: CPT | Mod: 26

## 2019-11-06 PROCEDURE — 93970 EXTREMITY STUDY: CPT | Mod: 26

## 2019-11-06 PROCEDURE — 99223 1ST HOSP IP/OBS HIGH 75: CPT

## 2019-11-06 RX ORDER — SODIUM CHLORIDE 9 MG/ML
1000 INJECTION, SOLUTION INTRAVENOUS
Refills: 0 | Status: DISCONTINUED | OUTPATIENT
Start: 2019-11-06 | End: 2019-11-07

## 2019-11-06 RX ORDER — TIOTROPIUM BROMIDE 18 UG/1
1 CAPSULE ORAL; RESPIRATORY (INHALATION) DAILY
Refills: 0 | Status: DISCONTINUED | OUTPATIENT
Start: 2019-11-06 | End: 2019-11-16

## 2019-11-06 RX ORDER — GLUCAGON INJECTION, SOLUTION 0.5 MG/.1ML
1 INJECTION, SOLUTION SUBCUTANEOUS ONCE
Refills: 0 | Status: DISCONTINUED | OUTPATIENT
Start: 2019-11-06 | End: 2019-11-16

## 2019-11-06 RX ORDER — PROPRANOLOL HCL 160 MG
20 CAPSULE, EXTENDED RELEASE 24HR ORAL DAILY
Refills: 0 | Status: DISCONTINUED | OUTPATIENT
Start: 2019-11-06 | End: 2019-11-07

## 2019-11-06 RX ORDER — ACETAMINOPHEN 500 MG
650 TABLET ORAL EVERY 6 HOURS
Refills: 0 | Status: DISCONTINUED | OUTPATIENT
Start: 2019-11-06 | End: 2019-11-16

## 2019-11-06 RX ORDER — DOCUSATE SODIUM 100 MG
100 CAPSULE ORAL
Refills: 0 | Status: DISCONTINUED | OUTPATIENT
Start: 2019-11-06 | End: 2019-11-06

## 2019-11-06 RX ORDER — ENOXAPARIN SODIUM 100 MG/ML
30 INJECTION SUBCUTANEOUS DAILY
Refills: 0 | Status: DISCONTINUED | OUTPATIENT
Start: 2019-11-06 | End: 2019-11-16

## 2019-11-06 RX ORDER — DEXTROSE 50 % IN WATER 50 %
12.5 SYRINGE (ML) INTRAVENOUS ONCE
Refills: 0 | Status: DISCONTINUED | OUTPATIENT
Start: 2019-11-06 | End: 2019-11-16

## 2019-11-06 RX ORDER — DEXTROSE 50 % IN WATER 50 %
25 SYRINGE (ML) INTRAVENOUS ONCE
Refills: 0 | Status: DISCONTINUED | OUTPATIENT
Start: 2019-11-06 | End: 2019-11-16

## 2019-11-06 RX ORDER — INSULIN LISPRO 100/ML
VIAL (ML) SUBCUTANEOUS
Refills: 0 | Status: DISCONTINUED | OUTPATIENT
Start: 2019-11-06 | End: 2019-11-16

## 2019-11-06 RX ORDER — SODIUM CHLORIDE 9 MG/ML
2300 INJECTION INTRAMUSCULAR; INTRAVENOUS; SUBCUTANEOUS ONCE
Refills: 0 | Status: COMPLETED | OUTPATIENT
Start: 2019-11-06 | End: 2019-11-06

## 2019-11-06 RX ORDER — ASPIRIN/CALCIUM CARB/MAGNESIUM 324 MG
81 TABLET ORAL DAILY
Refills: 0 | Status: DISCONTINUED | OUTPATIENT
Start: 2019-11-06 | End: 2019-11-16

## 2019-11-06 RX ORDER — PIPERACILLIN AND TAZOBACTAM 4; .5 G/20ML; G/20ML
3.38 INJECTION, POWDER, LYOPHILIZED, FOR SOLUTION INTRAVENOUS ONCE
Refills: 0 | Status: COMPLETED | OUTPATIENT
Start: 2019-11-06 | End: 2019-11-06

## 2019-11-06 RX ORDER — AMLODIPINE BESYLATE 2.5 MG/1
5 TABLET ORAL DAILY
Refills: 0 | Status: DISCONTINUED | OUTPATIENT
Start: 2019-11-06 | End: 2019-11-07

## 2019-11-06 RX ORDER — FINASTERIDE 5 MG/1
5 TABLET, FILM COATED ORAL DAILY
Refills: 0 | Status: DISCONTINUED | OUTPATIENT
Start: 2019-11-06 | End: 2019-11-16

## 2019-11-06 RX ORDER — SENNA PLUS 8.6 MG/1
2 TABLET ORAL AT BEDTIME
Refills: 0 | Status: DISCONTINUED | OUTPATIENT
Start: 2019-11-06 | End: 2019-11-16

## 2019-11-06 RX ORDER — SODIUM CHLORIDE 9 MG/ML
1000 INJECTION, SOLUTION INTRAVENOUS
Refills: 0 | Status: DISCONTINUED | OUTPATIENT
Start: 2019-11-06 | End: 2019-11-16

## 2019-11-06 RX ORDER — SODIUM CHLORIDE 9 MG/ML
1000 INJECTION INTRAMUSCULAR; INTRAVENOUS; SUBCUTANEOUS ONCE
Refills: 0 | Status: COMPLETED | OUTPATIENT
Start: 2019-11-06 | End: 2019-11-06

## 2019-11-06 RX ORDER — ENOXAPARIN SODIUM 100 MG/ML
40 INJECTION SUBCUTANEOUS DAILY
Refills: 0 | Status: DISCONTINUED | OUTPATIENT
Start: 2019-11-06 | End: 2019-11-06

## 2019-11-06 RX ORDER — DEXTROSE 50 % IN WATER 50 %
15 SYRINGE (ML) INTRAVENOUS ONCE
Refills: 0 | Status: DISCONTINUED | OUTPATIENT
Start: 2019-11-06 | End: 2019-11-16

## 2019-11-06 RX ADMIN — SODIUM CHLORIDE 2300 MILLILITER(S): 9 INJECTION INTRAMUSCULAR; INTRAVENOUS; SUBCUTANEOUS at 16:47

## 2019-11-06 RX ADMIN — SODIUM CHLORIDE 100 MILLILITER(S): 9 INJECTION, SOLUTION INTRAVENOUS at 23:57

## 2019-11-06 RX ADMIN — Medication 20 MILLIGRAM(S): at 23:57

## 2019-11-06 RX ADMIN — Medication 100 MILLIGRAM(S): at 23:56

## 2019-11-06 RX ADMIN — SODIUM CHLORIDE 1000 MILLILITER(S): 9 INJECTION INTRAMUSCULAR; INTRAVENOUS; SUBCUTANEOUS at 18:43

## 2019-11-06 RX ADMIN — Medication 81 MILLIGRAM(S): at 23:56

## 2019-11-06 RX ADMIN — PIPERACILLIN AND TAZOBACTAM 200 GRAM(S): 4; .5 INJECTION, POWDER, LYOPHILIZED, FOR SOLUTION INTRAVENOUS at 23:58

## 2019-11-06 RX ADMIN — FINASTERIDE 5 MILLIGRAM(S): 5 TABLET, FILM COATED ORAL at 23:56

## 2019-11-06 RX ADMIN — AMLODIPINE BESYLATE 5 MILLIGRAM(S): 2.5 TABLET ORAL at 23:56

## 2019-11-06 RX ADMIN — SENNA PLUS 2 TABLET(S): 8.6 TABLET ORAL at 23:56

## 2019-11-06 RX ADMIN — ENOXAPARIN SODIUM 30 MILLIGRAM(S): 100 INJECTION SUBCUTANEOUS at 23:56

## 2019-11-06 NOTE — ED PROVIDER NOTE - CONSTITUTIONAL, MLM
normal... Well appearing, well nourished, awake, alert, oriented to person, place, time/situation and in no apparent distress. No nasal flaring

## 2019-11-06 NOTE — ED PROVIDER NOTE - PROGRESS NOTE DETAILS
Dr. Burden is notified sts call tele med to admit pt to Dr. Malik and he will signed out to Dr. Malik. Dr. Taveras tele med is notified and admit pt. pt's wife Mrs Ramírez [phone] is notified and sts pt has been increasing urinary frequency for the last few days.

## 2019-11-06 NOTE — ED ADULT TRIAGE NOTE - CHIEF COMPLAINT QUOTE
patient BIBA c/o of chest pain no radiation and difficulty breathing with dry cough started 6 days ago

## 2019-11-06 NOTE — ED PROVIDER NOTE - OBJECTIVE STATEMENT
89 years old male by ems with family c/o cough for three weeks sob today. Pt denies headache, dizziness, blurred visions, light sensitivities, focal/distal weakness or numbness, cough, sob, chest pain, nausea, vomiting, abd pain.

## 2019-11-06 NOTE — H&P ADULT - NSHPLABSRESULTS_GEN_ALL_CORE
LABS:                        12.8   9.38  )-----------( 192      ( 2019 16:31 )             40.7         138  |  107  |  33<H>  ----------------------------<  116<H>  4.0   |  25  |  1.74<H>    Ca    9.1      2019 16:31    TPro  8.4<H>  /  Alb  3.0<L>  /  TBili  0.4  /  DBili  x   /  AST  13<L>  /  ALT  14  /  AlkPhos  45      PT/INR - ( 2019 16:31 )   PT: 13.8 sec;   INR: 1.22 ratio         PTT - ( 2019 16:31 )  PTT:24.3 sec  Urinalysis Basic - ( 2019 20:08 )    Color: Yellow / Appearance: very cloudy / S.015 / pH: x  Gluc: x / Ketone: Negative  / Bili: Negative / Urobili: Negative mg/dL   Blood: x / Protein: 30 mg/dL / Nitrite: Positive   Leuk Esterase: Moderate / RBC: x / WBC >50   Sq Epi: x / Non Sq Epi: Moderate / Bacteria: Many      CAPILLARY BLOOD GLUCOSE  D-Dimer Assay, Quantitative: 2557: D-Dimer result less than 230 ng/mL DDU correlates with the absence of  thrombosis in a patient with a low and moderate       pre-test probability of thrombosis.  1 DDU is approximately equal to  2 ng/mL FEU (previous units). ng/mL DDU (19 @ 16:31)  FLU A B RSV Detection by PCR (19 @ 16:54)    Flu A Result: [facility]: The Flu A B RSV assay is a Real-Time PCR test for the qualitative  detection and differentiation of Influenza A, Influenza B, and  Respiratory Syncytial Virus on nasopharyngeal swabs. The results should  be interpreted in the context of all clinical and laboratory findings.    [facility]: [facility]    RSV Result: [facility]  Blood Gas Profile - Arterial (19 @ 16:45)    Blood Gas Source: Arterial    Blood Gas Comments: bleeding or hemotoma noted.    pH, Blood: 7.43    pCO2, Arterial: 30 mmHg    pO2, Arterial: 97 mmHg    HCO3, Arterial: 20 mmol/L    Base Excess, Arterial: -2.9 mmol/L    Oxygen Saturation, Arterial: 98 %    FIO2, Arterial: 21.0  Lactate, Blood: 1.6 mmol/L (19 @ 20:08)  Lactate, Blood: 2.4 mmol/L (19 @ 16:31)  Serum Pro-Brain Natriuretic Peptide: 1062 pg/mL (19 @ 16:31)            RADIOLOGY & ADDITIONAL TESTS:  < from: CT Angio Chest w/ IV Cont (19 @ 17:52) >    PULMONARY ARTERIES: No pulmonary embolism to the segmental branches.   Limited visualization of subsegmental branches secondary to respiratory   motion.    LUNGS, AIRWAYS: The central airways are patent. The lungs are clear. Mild   emphysema.    PLEURA: No pleural abnormality.    VESSELS: Normal caliber aorta.     HEART: Mild cardiomegaly. No pericardial effusion.    MEDIASTINUM, TOPHER, AXILLAE: No adenopathy.    < end of copied text >    < from: Xray Chest 1 View-PORTABLE IMMEDIATE (19 @ 16:21) >    IMPRESSION: No acute finding or change. flat diaphragms    < end of copied text >      Imaging Personally Reviewed:  [x ] YES  [ ] NO    Consultant(s) Notes Reviewed:  [x ] YES  [ ] NO  EKG: A fib@ 87 non spec ST T changes

## 2019-11-06 NOTE — H&P ADULT - NSHPPHYSICALEXAM_GEN_ALL_CORE
T(C): 36.6 (06 Nov 2019 21:57), Max: 36.7 (06 Nov 2019 15:17)  T(F): 97.9 (06 Nov 2019 21:57), Max: 98 (06 Nov 2019 15:17)  HR: 85 (06 Nov 2019 21:57) (74 - 90)  BP: 106/68 (06 Nov 2019 21:57) (106/66 - 117/70)  BP(mean): --  RR: 17 (06 Nov 2019 21:57) (17 - 98)  SpO2: 100% (06 Nov 2019 21:57) (96% - 100%)    PHYSICAL EXAM:  GENERAL: NAD, well-groomed, well-developed  HEAD:  Atraumatic, Normocephalic  EYES: EOMI, PERRLA, conjunctiva and sclera clear  ENMT: No tonsillar erythema, exudates, or enlargement; dry mucous membranes,  No lesions  NECK: Supple, No JVD, Normal thyroid  NERVOUS SYSTEM:  Alert & Oriented X3, CN 2-12 intact, no focal deficits  CHEST/LUNG:  distant breath sounds  HEART: irregular rate and rhythm; No murmurs, rubs, or gallops  ABDOMEN: Soft, Nontender, Nondistended; Bowel sounds present  EXTREMITIES:  + Peripheral Pulses, No clubbing, cyanosis, or edema  LYMPH: No lymphadenopathy noted  SKIN: No rashes or lesions

## 2019-11-06 NOTE — H&P ADULT - HISTORY OF PRESENT ILLNESS
89 year old male PMH HTN, DM2, atrial fibrillation,  bibems with family c/o dry cough for three weeks with sob today. Pt denies headache, dizziness, blurred vision, light sensitivity, focal/distal weakness or numbness, palpitations, chest pain, nausea, vomiting, abd pain. Pt is very poor historian.

## 2019-11-06 NOTE — ED ADULT NURSE NOTE - NSIMPLEMENTINTERV_GEN_ALL_ED
Implemented All Universal Safety Interventions:  [place] to call system. Call bell, personal items and telephone within reach. Instruct patient to call for assistance. Room bathroom lighting operational. Non-slip footwear when patient is off stretcher. Physically safe environment: no spills, clutter or unnecessary equipment. Stretcher in lowest position, wheels locked, appropriate side rails in place.

## 2019-11-06 NOTE — CONSULT NOTE ADULT - ASSESSMENT
89 yr old male with known history of htn, DM, Afib presents with cough as per ED , upon asking the patient he states he doesn't have the slightest clue why he is in ED  Renal failure- hold metformin and ACEI, avoid nephrotoxin  BPH- cont Finasteride   Afib - rate controlled- fall risk- cont aspirin, Inderal  COPD- no exacerbation as per ED attending- cont spiriva   DM- FS with HISS AC and HS   DVT PPx- Lovenox

## 2019-11-06 NOTE — H&P ADULT - NSHPREVIEWOFSYSTEMS_GEN_ALL_CORE
REVIEW OF SYSTEMS:  CONSTITUTIONAL: No fever, weight loss, or fatigue  EYES: No eye pain, visual disturbances, or discharge  ENMT:  No difficulty hearing, tinnitus, vertigo; No sinus or throat pain  NECK: No pain or stiffness  RESPIRATORY: + cough, no wheezing, chills or hemoptysis; + shortness of breath  CARDIOVASCULAR: No chest pain, palpitations, dizziness, or leg swelling  GASTROINTESTINAL: No abdominal or epigastric pain. No nausea, vomiting, or hematemesis; No diarrhea or constipation. No melena or hematochezia.  GENITOURINARY: No dysuria, frequency, hematuria, or incontinence  NEUROLOGICAL: No headaches, memory loss, loss of strength, numbness, or tremors  SKIN: No itching, burning, rashes, or lesions   LYMPH NODES: No enlarged glands  ENDOCRINE: No heat or cold intolerance; No hair loss  MUSCULOSKELETAL: No joint pain or swelling; No muscle, back, or extremity pain  PSYCHIATRIC: No depression, anxiety, mood swings, or difficulty sleeping  HEME/LYMPH: No easy bruising, or bleeding gums  ALLERGY AND IMMUNOLOGIC: No hives or eczema

## 2019-11-06 NOTE — CONSULT NOTE ADULT - SUBJECTIVE AND OBJECTIVE BOX
REASON FOR Tele-evaluation    SUBJECTIVE: don't have the slightest clue"    HPI: 89 yr old male with known history of htn, DM, Afib presents with cough as per ED , upon asking the patient he states he doesn't have the slightest clue why he is in ED . he doesn't remember and at present denied any SOB or any cough , he is lying flat comfortably in bed, there is no family at bedside     ROS: unreliable - denied every thing     T(C): 36.7 (11-06-19 @ 15:17), Max: 36.7 (11-06-19 @ 15:17)  HR: 90 (11-06-19 @ 15:17) (90 - 90)  BP: 117/70 (11-06-19 @ 15:17) (117/70 - 117/70)  RR: 24 (11-06-19 @ 15:17) (24 - 98)  SpO2: 96% (11-06-19 @ 15:17) (96% - 96%)    PHYSICAL EXAM: evaluation precludes physical exam. Pertinent physical exam findings as per video conference with  nurse at bedside is as follow: gassers clear but pleasantly confused                           12.8   9.38  )-----------( 192      ( 06 Nov 2019 16:31 )             40.7   11-06    138  |  107  |  33<H>  ----------------------------<  116<H>  4.0   |  25  |  1.74<H>    Ca    9.1      06 Nov 2019 16:31    TPro  8.4<H>  /  Alb  3.0<L>  /  TBili  0.4  /  DBili  x   /  AST  13<L>  /  ALT  14  /  AlkPhos  45  11-06          Radiology findings         Medication reconciliation done                 Survey offered to patient - noc ell phone , no family at bedside

## 2019-11-06 NOTE — H&P ADULT - ASSESSMENT
89 year old male PMH HTN, DM2, atrial fibrillation,  bibems with family c/o dry cough for three weeks with sob today. Pt denies headache, dizziness, blurred vision, light sensitivity, focal/distal weakness or numbness, palpitations, chest pain, nausea, vomiting, abd pain. Pt is very poor historian. CTA shows no PE, emphysematous changes, no pneumonia, heart size enlarged.  Likely chronic COPD with possible mild CHF.  IMPROVE VTE Individual Risk Assessment          RISK                                                          Points    [  ] Previous VTE                                                3    [  ] Thrombophilia                                             2    [  ] Lower limb paralysis                                    2        (unable to hold up >15 seconds)      [  ] Current Cancer                                             2         (within 6 months)    [  ] Immobilization > 24 hrs                              1    [  ] ICU/CCU stay > 24 hours                            1    [ x ] Age > 60                                                    1    IMPROVE VTE Score ___1______

## 2019-11-07 DIAGNOSIS — Z29.9 ENCOUNTER FOR PROPHYLACTIC MEASURES, UNSPECIFIED: ICD-10-CM

## 2019-11-07 DIAGNOSIS — N30.01 ACUTE CYSTITIS WITH HEMATURIA: ICD-10-CM

## 2019-11-07 LAB
ANION GAP SERPL CALC-SCNC: 6 MMOL/L — SIGNIFICANT CHANGE UP (ref 5–17)
BASOPHILS # BLD AUTO: 0.03 K/UL — SIGNIFICANT CHANGE UP (ref 0–0.2)
BASOPHILS NFR BLD AUTO: 0.3 % — SIGNIFICANT CHANGE UP (ref 0–2)
BUN SERPL-MCNC: 26 MG/DL — HIGH (ref 7–23)
CALCIUM SERPL-MCNC: 9.2 MG/DL — SIGNIFICANT CHANGE UP (ref 8.5–10.1)
CHLORIDE SERPL-SCNC: 111 MMOL/L — HIGH (ref 96–108)
CO2 SERPL-SCNC: 24 MMOL/L — SIGNIFICANT CHANGE UP (ref 22–31)
CREAT SERPL-MCNC: 1.4 MG/DL — HIGH (ref 0.5–1.3)
EOSINOPHIL # BLD AUTO: 0.01 K/UL — SIGNIFICANT CHANGE UP (ref 0–0.5)
EOSINOPHIL NFR BLD AUTO: 0.1 % — SIGNIFICANT CHANGE UP (ref 0–6)
GLUCOSE BLDC GLUCOMTR-MCNC: 101 MG/DL — HIGH (ref 70–99)
GLUCOSE BLDC GLUCOMTR-MCNC: 115 MG/DL — HIGH (ref 70–99)
GLUCOSE BLDC GLUCOMTR-MCNC: 118 MG/DL — HIGH (ref 70–99)
GLUCOSE BLDC GLUCOMTR-MCNC: 71 MG/DL — SIGNIFICANT CHANGE UP (ref 70–99)
GLUCOSE BLDC GLUCOMTR-MCNC: 91 MG/DL — SIGNIFICANT CHANGE UP (ref 70–99)
GLUCOSE SERPL-MCNC: 91 MG/DL — SIGNIFICANT CHANGE UP (ref 70–99)
HBA1C BLD-MCNC: 6 % — HIGH (ref 4–5.6)
HCT VFR BLD CALC: 39.4 % — SIGNIFICANT CHANGE UP (ref 39–50)
HGB BLD-MCNC: 12.3 G/DL — LOW (ref 13–17)
IMM GRANULOCYTES NFR BLD AUTO: 0.6 % — SIGNIFICANT CHANGE UP (ref 0–1.5)
LYMPHOCYTES # BLD AUTO: 1.48 K/UL — SIGNIFICANT CHANGE UP (ref 1–3.3)
LYMPHOCYTES # BLD AUTO: 16.3 % — SIGNIFICANT CHANGE UP (ref 13–44)
MAGNESIUM SERPL-MCNC: 1.8 MG/DL — SIGNIFICANT CHANGE UP (ref 1.6–2.6)
MCHC RBC-ENTMCNC: 28.1 PG — SIGNIFICANT CHANGE UP (ref 27–34)
MCHC RBC-ENTMCNC: 31.2 GM/DL — LOW (ref 32–36)
MCV RBC AUTO: 90 FL — SIGNIFICANT CHANGE UP (ref 80–100)
MONOCYTES # BLD AUTO: 0.83 K/UL — SIGNIFICANT CHANGE UP (ref 0–0.9)
MONOCYTES NFR BLD AUTO: 9.2 % — SIGNIFICANT CHANGE UP (ref 2–14)
NEUTROPHILS # BLD AUTO: 6.67 K/UL — SIGNIFICANT CHANGE UP (ref 1.8–7.4)
NEUTROPHILS NFR BLD AUTO: 73.5 % — SIGNIFICANT CHANGE UP (ref 43–77)
NRBC # BLD: 0 /100 WBCS — SIGNIFICANT CHANGE UP (ref 0–0)
PHOSPHATE SERPL-MCNC: 3 MG/DL — SIGNIFICANT CHANGE UP (ref 2.5–4.5)
PLATELET # BLD AUTO: 173 K/UL — SIGNIFICANT CHANGE UP (ref 150–400)
POTASSIUM SERPL-MCNC: 4.6 MMOL/L — SIGNIFICANT CHANGE UP (ref 3.5–5.3)
POTASSIUM SERPL-SCNC: 4.6 MMOL/L — SIGNIFICANT CHANGE UP (ref 3.5–5.3)
RBC # BLD: 4.38 M/UL — SIGNIFICANT CHANGE UP (ref 4.2–5.8)
RBC # FLD: 13.9 % — SIGNIFICANT CHANGE UP (ref 10.3–14.5)
SODIUM SERPL-SCNC: 141 MMOL/L — SIGNIFICANT CHANGE UP (ref 135–145)
WBC # BLD: 9.07 K/UL — SIGNIFICANT CHANGE UP (ref 3.8–10.5)
WBC # FLD AUTO: 9.07 K/UL — SIGNIFICANT CHANGE UP (ref 3.8–10.5)

## 2019-11-07 PROCEDURE — 99233 SBSQ HOSP IP/OBS HIGH 50: CPT

## 2019-11-07 RX ORDER — CEFTRIAXONE 500 MG/1
1000 INJECTION, POWDER, FOR SOLUTION INTRAMUSCULAR; INTRAVENOUS EVERY 24 HOURS
Refills: 0 | Status: COMPLETED | OUTPATIENT
Start: 2019-11-08 | End: 2019-11-12

## 2019-11-07 RX ORDER — CEFTRIAXONE 500 MG/1
1000 INJECTION, POWDER, FOR SOLUTION INTRAMUSCULAR; INTRAVENOUS ONCE
Refills: 0 | Status: COMPLETED | OUTPATIENT
Start: 2019-11-07 | End: 2019-11-07

## 2019-11-07 RX ORDER — INFLUENZA VIRUS VACCINE 15; 15; 15; 15 UG/.5ML; UG/.5ML; UG/.5ML; UG/.5ML
0.5 SUSPENSION INTRAMUSCULAR ONCE
Refills: 0 | Status: COMPLETED | OUTPATIENT
Start: 2019-11-07 | End: 2019-11-07

## 2019-11-07 RX ORDER — CEFTRIAXONE 500 MG/1
INJECTION, POWDER, FOR SOLUTION INTRAMUSCULAR; INTRAVENOUS
Refills: 0 | Status: COMPLETED | OUTPATIENT
Start: 2019-11-07 | End: 2019-11-12

## 2019-11-07 RX ORDER — PROPRANOLOL HCL 160 MG
20 CAPSULE, EXTENDED RELEASE 24HR ORAL DAILY
Refills: 0 | Status: DISCONTINUED | OUTPATIENT
Start: 2019-11-07 | End: 2019-11-16

## 2019-11-07 RX ORDER — AMLODIPINE BESYLATE 2.5 MG/1
5 TABLET ORAL DAILY
Refills: 0 | Status: DISCONTINUED | OUTPATIENT
Start: 2019-11-07 | End: 2019-11-16

## 2019-11-07 RX ORDER — SODIUM CHLORIDE 9 MG/ML
1000 INJECTION, SOLUTION INTRAVENOUS
Refills: 0 | Status: DISCONTINUED | OUTPATIENT
Start: 2019-11-07 | End: 2019-11-08

## 2019-11-07 RX ADMIN — ENOXAPARIN SODIUM 30 MILLIGRAM(S): 100 INJECTION SUBCUTANEOUS at 11:54

## 2019-11-07 RX ADMIN — Medication 81 MILLIGRAM(S): at 11:54

## 2019-11-07 RX ADMIN — TIOTROPIUM BROMIDE 1 CAPSULE(S): 18 CAPSULE ORAL; RESPIRATORY (INHALATION) at 11:54

## 2019-11-07 RX ADMIN — Medication 100 MILLIGRAM(S): at 17:53

## 2019-11-07 RX ADMIN — SODIUM CHLORIDE 50 MILLILITER(S): 9 INJECTION, SOLUTION INTRAVENOUS at 14:50

## 2019-11-07 RX ADMIN — SENNA PLUS 2 TABLET(S): 8.6 TABLET ORAL at 21:01

## 2019-11-07 RX ADMIN — CEFTRIAXONE 100 MILLIGRAM(S): 500 INJECTION, POWDER, FOR SOLUTION INTRAMUSCULAR; INTRAVENOUS at 20:54

## 2019-11-07 RX ADMIN — FINASTERIDE 5 MILLIGRAM(S): 5 TABLET, FILM COATED ORAL at 11:54

## 2019-11-07 NOTE — CONSULT NOTE ADULT - SUBJECTIVE AND OBJECTIVE BOX
Patient is a 89y old  Male with hx of CKD/Cr 1.2-1.3 and now LETICIA amidst acute respiratory infection, UTI. Cr improved from 1.74 to 1.4 over 24h. IVFs dced out of concern for component of HF. He is awake and alert. Relates mild dyspnea.         PAST MEDICAL & SURGICAL HISTORY:  Dislocation of hip, left, closed: - repeated 3 times, last on 16  Arrhythmia, long term  Diabetes  Hypertension  Hx of shoulder surgery  History of total hip replacement      Social Hx: former smoker    FAMILY HISTORY:  Family history of hypertension (Sibling, Sibling)  Family history of diabetes mellitus (Sibling, Sibling)      Allergies    No Known Allergies            MEDICATIONS  (STANDING):  amLODIPine   Tablet 5 milliGRAM(s) Oral daily  aspirin  chewable 81 milliGRAM(s) Oral daily  dextrose 5%. 1000 milliLiter(s) (50 mL/Hr) IV Continuous <Continuous>  dextrose 50% Injectable 12.5 Gram(s) IV Push once  dextrose 50% Injectable 25 Gram(s) IV Push once  dextrose 50% Injectable 25 Gram(s) IV Push once  enoxaparin Injectable 30 milliGRAM(s) SubCutaneous daily  finasteride 5 milliGRAM(s) Oral daily  influenza   Vaccine 0.5 milliLiter(s) IntraMuscular once  insulin lispro (HumaLOG) corrective regimen sliding scale   SubCutaneous three times a day before meals  lactated ringers. 1000 milliLiter(s) (100 mL/Hr) IV Continuous <Continuous>  propranolol 20 milliGRAM(s) Oral daily  senna 2 Tablet(s) Oral at bedtime  tiotropium 18 MICROgram(s) Capsule 1 Capsule(s) Inhalation daily    MEDICATIONS  (PRN):  acetaminophen   Tablet .. 650 milliGRAM(s) Oral every 6 hours PRN Temp greater or equal to 38C (100.4F), Moderate Pain (4 - 6)  dextrose 40% Gel 15 Gram(s) Oral once PRN Blood Glucose LESS THAN 70 milliGRAM(s)/deciliter  glucagon  Injectable 1 milliGRAM(s) IntraMuscular once PRN Glucose LESS THAN 70 milligrams/deciliter  guaiFENesin    Syrup 100 milliGRAM(s) Oral every 6 hours PRN Cough      Daily Height in cm: 187.96 (2019 23:10)    Daily Weight in k.2 (2019 06:10)    Drug Dosing Weight  Height (cm): 185.42 (2019 00:25)  Weight (kg): 75.8 (2019 00:25)  BMI (kg/m2): 22 (2019 00:25)  BSA (m2): 1.99 (2019 00:25)      REVIEW OF SYSTEMS:    Per HPI        ABG - ( 2019 16:45 )  pH, Arterial: x     pH, Blood: 7.43  /  pCO2: 30    /  pO2: 97    / HCO3: 20    / Base Excess: -2.9  /  SaO2: 98                  I&O's Detail        PHYSICAL EXAM:    GENERAL: NAD  HEAD:  Atraumatic, Normocephalic  EYES: EOMI, PERRLA, conjunctiva and sclera clear  ENMT: No tonsillar erythema, exudates, or enlargement; Moist mucous membranes, Good dentition, No lesions  NECK: Supple, No JVD, Normal thyroid  CHEST/LUNG: rhonchi  HEART: Regular rate and rhythm; No murmurs, rubs, or gallops  ABDOMEN: Soft, Nontender, Nondistended; Bowel sounds present  EXTREMITIES:  2+ Peripheral Pulses, No clubbing, cyanosis, or edema      LABS:  CBC Full  -  ( 2019 08:06 )  WBC Count : 9.07 K/uL  RBC Count : 4.38 M/uL  Hemoglobin : 12.3 g/dL  Hematocrit : 39.4 %  Platelet Count - Automated : 173 K/uL  Mean Cell Volume : 90.0 fl  Mean Cell Hemoglobin : 28.1 pg  Mean Cell Hemoglobin Concentration : 31.2 gm/dL  Auto Neutrophil # : 6.67 K/uL  Auto Lymphocyte # : 1.48 K/uL  Auto Monocyte # : 0.83 K/uL  Auto Eosinophil # : 0.01 K/uL  Auto Basophil # : 0.03 K/uL  Auto Neutrophil % : 73.5 %  Auto Lymphocyte % : 16.3 %  Auto Monocyte % : 9.2 %  Auto Eosinophil % : 0.1 %  Auto Basophil % : 0.3 %        141  |  111<H>  |  26<H>  ----------------------------<  91  4.6   |  24  |  1.40<H>    Ca    9.2      2019 08:06  Phos  3.0     11  Mg     1.8         TPro  8.4<H>  /  Alb  3.0<L>  /  TBili  0.4  /  DBili  x   /  AST  13<L>  /  ALT  14  /  AlkPhos  45  11-06    CAPILLARY BLOOD GLUCOSE      POCT Blood Glucose.: 91 mg/dL (2019 11:06)    PT/INR - ( 2019 16:31 )   PT: 13.8 sec;   INR: 1.22 ratio         PTT - ( 2019 16:31 )  PTT:24.3 sec  Urinalysis Basic - ( 2019 20:08 )    Color: Yellow / Appearance: very cloudy / S.015 / pH: x  Gluc: x / Ketone: Negative  / Bili: Negative / Urobili: Negative mg/dL   Blood: x / Protein: 30 mg/dL / Nitrite: Positive   Leuk Esterase: Moderate / RBC: x / WBC >50   Sq Epi: x / Non Sq Epi: Moderate / Bacteria: Many      CARDIAC MARKERS ( 2019 16:31 )  <.015 ng/mL / x     / 37 U/L / x     / <1.0 ng/mL        Impression:  * LETICIA -- pre-renal  * CKD3. Cr 1.2-1.3 baseline  * COPD    Recommendations:   * 0.45% saline at 50cc/h for another day  * Consider Rocephin for UTI.   * BMP in am.

## 2019-11-07 NOTE — PROGRESS NOTE ADULT - SUBJECTIVE AND OBJECTIVE BOX
Patient is a 89y old  Male who presents with a chief complaint of Shortness of breath. (2019 13:30)      INTERVAL HPI/ OVERNIGHT EVENTS: Pt was seen and examined at bedside today, No significant overnight events     MEDICATIONS  (STANDING):  amLODIPine   Tablet 5 milliGRAM(s) Oral daily  aspirin  chewable 81 milliGRAM(s) Oral daily  cefTRIAXone   IVPB      dextrose 5%. 1000 milliLiter(s) (50 mL/Hr) IV Continuous <Continuous>  dextrose 50% Injectable 12.5 Gram(s) IV Push once  dextrose 50% Injectable 25 Gram(s) IV Push once  dextrose 50% Injectable 25 Gram(s) IV Push once  enoxaparin Injectable 30 milliGRAM(s) SubCutaneous daily  finasteride 5 milliGRAM(s) Oral daily  influenza   Vaccine 0.5 milliLiter(s) IntraMuscular once  insulin lispro (HumaLOG) corrective regimen sliding scale   SubCutaneous three times a day before meals  propranolol 20 milliGRAM(s) Oral daily  senna 2 Tablet(s) Oral at bedtime  sodium chloride 0.45%. 1000 milliLiter(s) (50 mL/Hr) IV Continuous <Continuous>  tiotropium 18 MICROgram(s) Capsule 1 Capsule(s) Inhalation daily    MEDICATIONS  (PRN):  acetaminophen   Tablet .. 650 milliGRAM(s) Oral every 6 hours PRN Temp greater or equal to 38C (100.4F), Moderate Pain (4 - 6)  dextrose 40% Gel 15 Gram(s) Oral once PRN Blood Glucose LESS THAN 70 milliGRAM(s)/deciliter  glucagon  Injectable 1 milliGRAM(s) IntraMuscular once PRN Glucose LESS THAN 70 milligrams/deciliter  guaiFENesin    Syrup 100 milliGRAM(s) Oral every 6 hours PRN Cough      Allergies    No Known Allergies    Intolerances        REVIEW OF SYSTEMS:    Unable to examine due to [ ] Encephalopathy [ ] Advanced Dementia [ ] Expressive Aphasia [ ] Non-verbal patient    CONSTITUTIONAL: No fever, NO generalized weakness/Fatigue, No weight loss  EYES: No eye pain, visual disturbances, or discharge  ENMT:  No difficulty hearing, tinnitus, vertigo; No sinus or throat pain  NECK: No pain or stiffness  RESPIRATORY: No shortness of breath,  cough, wheezing, sputum or hemoptysis   CARDIOVASCULAR: No chest pain, palpitations, or leg swelling  GASTROINTESTINAL: No abdominal pain. No nausea, vomiting, diarrhea or constipation. No melena or hematochezia.  GENITOURINARY: No dysuria, frequency, hematuria, or incontinence  NEUROLOGICAL: No headaches, Dizziness, memory loss, loss of strength, numbness, or tremors  SKIN: No itching, burning, rashes, or lesions   MUSCULOSKELETAL: No joint pain or swelling; No muscle, back, or extremity pain  PSYCHIATRIC: No depression, anxiety, mood swings, or difficulty sleeping  HEME/LYMPH: No easy bruising, or bleeding gums      Vital Signs Last 24 Hrs  T(C): 36.7 (2019 16:57), Max: 37.7 (2019 23:10)  T(F): 98 (2019 16:57), Max: 99.9 (2019 23:10)  HR: 92 (2019 16:57) (74 - 96)  BP: 125/77 (2019 16:57) (104/68 - 152/115)  BP(mean): --  RR: 17 (2019 16:57) (16 - 18)  SpO2: 95% (2019 16:57) (95% - 100%)    PHYSICAL EXAM:  GENERAL: NAD, well-developed, well-groomed  HEAD:  Atraumatic, Normocephalic  EYES: conjunctiva and sclera clear  ENMT: Moist mucous membranes  NECK: Supple, No JVD, Normal thyroid  CHEST/LUNG: Clear to Auscultation bilaterally; No rales, rhonchi, wheezing, or rubs  HEART: Regular rate and rhythm; No murmurs, rubs, or gallops  ABDOMEN: Soft, Nontender, Nondistended; Bowel sounds present  EXTREMITIES:  2+ Peripheral Pulses, No clubbing, cyanosis, or edema  SKIN: No rashes or lesions  NERVOUS SYSTEM:  Alert & Oriented X3, Good concentration; Motor Strength 5/5 B/L upper and lower extremities    LABS:                        12.3   9.07  )-----------( 173      ( 2019 08:06 )             39.4     11-07    141  |  111<H>  |  26<H>  ----------------------------<  91  4.6   |  24  |  1.40<H>    Ca    9.2      2019 08:06  Phos  3.0     11-  Mg     1.8     1107    TPro  8.4<H>  /  Alb  3.0<L>  /  TBili  0.4  /  DBili  x   /  AST  13<L>  /  ALT  14  /  AlkPhos  45      PT/INR - ( 2019 16:31 )   PT: 13.8 sec;   INR: 1.22 ratio         PTT - ( 2019 16:31 )  PTT:24.3 sec  Urinalysis Basic - ( 2019 20:08 )    Color: Yellow / Appearance: very cloudy / S.015 / pH: x  Gluc: x / Ketone: Negative  / Bili: Negative / Urobili: Negative mg/dL   Blood: x / Protein: 30 mg/dL / Nitrite: Positive   Leuk Esterase: Moderate / RBC: x / WBC >50   Sq Epi: x / Non Sq Epi: Moderate / Bacteria: Many      CAPILLARY BLOOD GLUCOSE      POCT Blood Glucose.: 118 mg/dL (2019 16:23)  POCT Blood Glucose.: 91 mg/dL (2019 11:06)  POCT Blood Glucose.: 71 mg/dL (2019 07:36)  POCT Blood Glucose.: 101 mg/dL (2019 00:07)        Culture - Urine (collected 19)  Source: .Urine Catheterized  Preliminary Report (19):    >100,000 CFU/ml Gram Negative Rods        RADIOLOGY & ADDITIONAL TESTS:          Imaging Personally Reviewed:  [ ] YES  [ ] NO    Consultant(s) Notes Reviewed:  [ ] YES  [ ] NO    Care Discussed with Consultants/Other Providers [x ] YES  [ ] NO

## 2019-11-07 NOTE — PHYSICAL THERAPY INITIAL EVALUATION ADULT - BALANCE TRAINING, PT EVAL
Patient will be normal in static and dynamic standing balance with rolling walker in 3-4 weeks to improve safety and decrease risk of falls.

## 2019-11-07 NOTE — PATIENT PROFILE ADULT - ARRIVAL FROM
69 y/o f with PMH of Atrial fibrillation on Eliquis, CAD s/p stent, hypothyroidism, DM, HTN was brought in by family after an episode of syncope at home.    Syncopal episode 2/2 arrhythmia vs Vasovagal syncope:  -EKG: sinus rhythm, no ST wave changes, CE (-) x2  -CT of head shows white mater changes and no acute intracranial pathology  -f/u orthostatics  -neuro consult?  -f/u echo    Epistaxis exacerbated by Eliquis:  -off eliquis for 1 week as recommended by her cardiologist, will hold  -off for now until seen by cardio  -hemoglobin at baseline    CAD s/p stent:  -c/w Aspirin, losartan, lipitor, sotalol    Atrial fibrillation  -c/w sotalol  -hold eliquis until seen by cardiologist    DM:   -Monitor FS and start insulin if >180    GERD:  -c/w protonix    Hypothyroidism:  -c/w synthroid  -f/u TSH    DVT ppx:  -heparin sq for now until eliquis is resumed    Diet:  DASH    Patient is Jehovah witness. Bloodless medicine following.  FULL CODE 69 y/o f with PMH of Atrial fibrillation on Eliquis, CAD s/p stent, hypothyroidism, DM, HTN was brought in by family after an episode of syncope at home.    Syncopal episode 2/2 arrhythmia vs Vasovagal syncope:  -EKG: sinus rhythm, no ST wave changes, CE (-) x2  -CT of head shows white mater changes and no acute intracranial pathology  -f/u orthostatics  -neuro consult?  -echo: EF 66%, G1DD, moderate PFO with L to R shunt across atrial septum    Epistaxis exacerbated by Eliquis:  -off eliquis for 1 week as recommended by her cardiologist, will hold  -off for now until seen by cardio  -hemoglobin at baseline    CAD s/p stent:  -c/w Aspirin, losartan, lipitor, sotalol    Atrial fibrillation  -c/w sotalol  -hold eliquis until seen by cardiologist    DM:   -Monitor FS and start insulin if >180    GERD:  -c/w protonix    Hypothyroidism:  -c/w synthroid  -f/u TSH    DVT ppx:  -heparin sq for now until eliquis is resumed    Diet:  DASH    Patient is Jehovah witness. Bloodless medicine following.  FULL CODE Home

## 2019-11-07 NOTE — PHYSICAL THERAPY INITIAL EVALUATION ADULT - ACTIVE RANGE OF MOTION EXAMINATION, REHAB EVAL
bilateral  lower extremity Active ROM was WFL (within functional limits)/deficits as listed below/bilateral upper extremity Active ROM was WFL (within functional limits)/R hip due to complaints of pain

## 2019-11-07 NOTE — PHYSICAL THERAPY INITIAL EVALUATION ADULT - ADDITIONAL COMMENTS
As per patient, he lives in a private house with 6 steps to enter and reports he was ambulating with a rolling walker prior.

## 2019-11-07 NOTE — PHYSICAL THERAPY INITIAL EVALUATION ADULT - CRITERIA FOR SKILLED THERAPEUTIC INTERVENTIONS
rehab potential/predicted duration of therapy intervention/anticipated discharge recommendation/functional limitations in following categories/risk reduction/prevention/impairments found/therapy frequency

## 2019-11-07 NOTE — PHYSICAL THERAPY INITIAL EVALUATION ADULT - BED MOBILITY TRAINING, PT EVAL
Pt will independently perform all aspects of bed mobility to help prevent pressure ulcers, by 4 weeks

## 2019-11-07 NOTE — PROGRESS NOTE ADULT - PROBLEM SELECTOR PLAN 1
COPD vs. ?CHF  CTA shows no acute changes. ABG: hypocapnia   continue w/ NC oxygen prn   PE ruled out, f/u Echo

## 2019-11-08 LAB
-  AMIKACIN: SIGNIFICANT CHANGE UP
-  AMPICILLIN/SULBACTAM: SIGNIFICANT CHANGE UP
-  AMPICILLIN: SIGNIFICANT CHANGE UP
-  AZTREONAM: SIGNIFICANT CHANGE UP
-  CEFAZOLIN: SIGNIFICANT CHANGE UP
-  CEFEPIME: SIGNIFICANT CHANGE UP
-  CEFOXITIN: SIGNIFICANT CHANGE UP
-  CEFTRIAXONE: SIGNIFICANT CHANGE UP
-  CIPROFLOXACIN: SIGNIFICANT CHANGE UP
-  ERTAPENEM: SIGNIFICANT CHANGE UP
-  IMIPENEM: SIGNIFICANT CHANGE UP
-  LEVOFLOXACIN: SIGNIFICANT CHANGE UP
-  MEROPENEM: SIGNIFICANT CHANGE UP
-  NITROFURANTOIN: SIGNIFICANT CHANGE UP
-  PIPERACILLIN/TAZOBACTAM: SIGNIFICANT CHANGE UP
-  TIGECYCLINE: SIGNIFICANT CHANGE UP
-  TRIMETHOPRIM/SULFAMETHOXAZOLE: SIGNIFICANT CHANGE UP
ANION GAP SERPL CALC-SCNC: 6 MMOL/L — SIGNIFICANT CHANGE UP (ref 5–17)
BUN SERPL-MCNC: 23 MG/DL — SIGNIFICANT CHANGE UP (ref 7–23)
CALCIUM SERPL-MCNC: 9.2 MG/DL — SIGNIFICANT CHANGE UP (ref 8.5–10.1)
CHLORIDE SERPL-SCNC: 109 MMOL/L — HIGH (ref 96–108)
CO2 SERPL-SCNC: 25 MMOL/L — SIGNIFICANT CHANGE UP (ref 22–31)
CREAT SERPL-MCNC: 1.35 MG/DL — HIGH (ref 0.5–1.3)
CULTURE RESULTS: SIGNIFICANT CHANGE UP
GLUCOSE BLDC GLUCOMTR-MCNC: 80 MG/DL — SIGNIFICANT CHANGE UP (ref 70–99)
GLUCOSE SERPL-MCNC: 101 MG/DL — HIGH (ref 70–99)
HCT VFR BLD CALC: 44.5 % — SIGNIFICANT CHANGE UP (ref 39–50)
HGB BLD-MCNC: 13.9 G/DL — SIGNIFICANT CHANGE UP (ref 13–17)
MCHC RBC-ENTMCNC: 28.3 PG — SIGNIFICANT CHANGE UP (ref 27–34)
MCHC RBC-ENTMCNC: 31.2 GM/DL — LOW (ref 32–36)
MCV RBC AUTO: 90.4 FL — SIGNIFICANT CHANGE UP (ref 80–100)
METHOD TYPE: SIGNIFICANT CHANGE UP
NRBC # BLD: 0 /100 WBCS — SIGNIFICANT CHANGE UP (ref 0–0)
ORGANISM # SPEC MICROSCOPIC CNT: SIGNIFICANT CHANGE UP
ORGANISM # SPEC MICROSCOPIC CNT: SIGNIFICANT CHANGE UP
PLATELET # BLD AUTO: 176 K/UL — SIGNIFICANT CHANGE UP (ref 150–400)
POTASSIUM SERPL-MCNC: 4.1 MMOL/L — SIGNIFICANT CHANGE UP (ref 3.5–5.3)
POTASSIUM SERPL-SCNC: 4.1 MMOL/L — SIGNIFICANT CHANGE UP (ref 3.5–5.3)
RBC # BLD: 4.92 M/UL — SIGNIFICANT CHANGE UP (ref 4.2–5.8)
RBC # FLD: 14 % — SIGNIFICANT CHANGE UP (ref 10.3–14.5)
SODIUM SERPL-SCNC: 140 MMOL/L — SIGNIFICANT CHANGE UP (ref 135–145)
SPECIMEN SOURCE: SIGNIFICANT CHANGE UP
WBC # BLD: 8.87 K/UL — SIGNIFICANT CHANGE UP (ref 3.8–10.5)
WBC # FLD AUTO: 8.87 K/UL — SIGNIFICANT CHANGE UP (ref 3.8–10.5)

## 2019-11-08 PROCEDURE — 99233 SBSQ HOSP IP/OBS HIGH 50: CPT

## 2019-11-08 PROCEDURE — 93306 TTE W/DOPPLER COMPLETE: CPT | Mod: 26

## 2019-11-08 RX ADMIN — AMLODIPINE BESYLATE 5 MILLIGRAM(S): 2.5 TABLET ORAL at 05:49

## 2019-11-08 RX ADMIN — Medication 81 MILLIGRAM(S): at 11:16

## 2019-11-08 RX ADMIN — CEFTRIAXONE 100 MILLIGRAM(S): 500 INJECTION, POWDER, FOR SOLUTION INTRAMUSCULAR; INTRAVENOUS at 21:06

## 2019-11-08 RX ADMIN — SODIUM CHLORIDE 50 MILLILITER(S): 9 INJECTION, SOLUTION INTRAVENOUS at 05:50

## 2019-11-08 RX ADMIN — TIOTROPIUM BROMIDE 1 CAPSULE(S): 18 CAPSULE ORAL; RESPIRATORY (INHALATION) at 11:16

## 2019-11-08 RX ADMIN — ENOXAPARIN SODIUM 30 MILLIGRAM(S): 100 INJECTION SUBCUTANEOUS at 11:16

## 2019-11-08 RX ADMIN — FINASTERIDE 5 MILLIGRAM(S): 5 TABLET, FILM COATED ORAL at 11:16

## 2019-11-08 RX ADMIN — Medication 20 MILLIGRAM(S): at 05:49

## 2019-11-08 RX ADMIN — Medication 100 MILLIGRAM(S): at 21:03

## 2019-11-08 RX ADMIN — SENNA PLUS 2 TABLET(S): 8.6 TABLET ORAL at 21:04

## 2019-11-08 NOTE — CHART NOTE - NSCHARTNOTEFT_GEN_A_CORE
Upon Nutritional Assessment by the Registered Dietitian your patient was determined to meet criteria / has evidence of the following diagnosis/diagnoses:          [ ]  Mild Protein Calorie Malnutrition        [X ]  Moderate Protein Calorie Malnutrition        [ ] Severe Protein Calorie Malnutrition        [ ] Unspecified Protein Calorie Malnutrition        [ ] Underweight / BMI <19        [ ] Morbid Obesity / BMI > 40      Findings as based on:  •  Comprehensive nutrition assessment and consultation  •  Calorie counts (nutrient intake analysis)  •  Food acceptance and intake status from observations by staff  •  Follow up  •  Patient education  •  Intervention secondary to interdisciplinary rounds  •   concerns      Treatment:    The following diet has been recommended:  Low Na diet + Glucerna x 2/day (provides 440 kcal, 20 g protein) for additional nutrition support.       PROVIDER Section:     By signing this assessment you are acknowledging and agree with the diagnosis/diagnoses assigned by the Registered Dietitian    Comments:

## 2019-11-08 NOTE — DIETITIAN INITIAL EVALUATION ADULT. - ETIOLOGY
Inadequate energy/protein intake related to renal failure Inadequate energy/protein intake related to renal failure and pressure ulcers

## 2019-11-08 NOTE — DIETITIAN INITIAL EVALUATION ADULT. - PERTINENT LABORATORY DATA
11-08 Na140 mmol/L Glu 101 mg/dL<H> K+ 4.1 mmol/L Cr  1.35 mg/dL<H> BUN 23 mg/dL 11-07 Phos 3.0 mg/dL 11-06 Alb 3.0 g/dL<L> 11-07 [id number] 6.0 %<H>  POCT (11-07): 115, 118, 91, 71, 101, (11-08) 128, 80

## 2019-11-08 NOTE — DIETITIAN INITIAL EVALUATION ADULT. - ENERGY NEEDS
Ht: 187.96cm (11-06)  Wt: 70.2 kg (11-08)  BMI: 19.9 (11-08)  IBW:   84kg +/- 10%       % IBW:   84%          UBW:    167          %UBW: 92%

## 2019-11-08 NOTE — DIETITIAN INITIAL EVALUATION ADULT. - PHYSICAL APPEARANCE
BMI: 19.9; no edema noted/underweight/other (specify) Nutrition focused physical exam conducted:  Subcutaneous fat loss: [Mild] Orbital fat pads region, [Unable]Buccal fat region, [Unable, pain]Triceps region,  [WDL]Ribs region.  Muscle wasting: [Moderate]Temples region, [Moderate]Clavicle region, [Moderate]Shoulder region, [Unable]Scapula region, [Mild]Interosseous region,  [Unable]thigh region, [Mild]Calf region

## 2019-11-08 NOTE — PROGRESS NOTE ADULT - ASSESSMENT
89 year old male PMH HTN, DM2, atrial fibrillation,  bibems with family c/o dry cough for three weeks with sob today. Pt denies headache, dizziness, blurred vision, light sensitivity, focal/distal weakness or numbness, palpitations, chest pain, nausea, vomiting, abd pain. Pt is very poor historian. CTA shows no PE, emphysematous changes, no pneumonia, heart size enlarged.

## 2019-11-08 NOTE — DIETITIAN INITIAL EVALUATION ADULT. - OTHER INFO
Pt lives with spouse who does the food shopping/cooking, regular diet with limited carbohydrates for diabetes management, shows understanding of appropriate diet for DM; Diet hx: eggs, pancake, and sausage for breakfast, small sandwich for lunch, and varying meat, starch, and vegetable for dinner, diet ginger ale, tea, and water for beverages, Ensure x 1 daily ; Pt denies chewing/swallowing difficulty, with full upper/lower dentures; denies N/V/D/C, last BM x 1 (11/07);     Pt reports decrease in appetite over the last few months; UBW= 167# obtained from EMR (06-22-19)

## 2019-11-08 NOTE — CONSULT NOTE ADULT - SUBJECTIVE AND OBJECTIVE BOX
Patient is a 89y old  Male who presents with a chief complaint of Shortness of breath. (2019 20:39)    HPI:  89 year old male with HTN, DM2, Atrial fibrillation,  brought  with family c/o dry cough for three weeks with sob today and occasional chills. No fever, URI or sputum production  Denies headache, dizziness, blurred vision, light sensitivity, focal/distal weakness or numbness, palpitations, chest pain, nausea, vomiting, abd pain. Pt is very poor historian.   Smoked close to 55 years, quit 25 years ago.    PAST MEDICAL & SURGICAL HISTORY:  Dislocation of hip, left, closed: - repeated 3 times, last on 16  Arrhythmia, long term  Diabetes  Hypertension  Hx of shoulder surgery  History of total hip replacement    FAMILY HISTORY:  Family history of hypertension (Sibling, Sibling)  Family history of diabetes mellitus (Sibling, Sibling)    SOCIAL HISTORY:  ex smoker    Allergies  No Known Allergies    MEDICATIONS  (STANDING):  amLODIPine   Tablet 5 milliGRAM(s) Oral daily  aspirin  chewable 81 milliGRAM(s) Oral daily  cefTRIAXone   IVPB      cefTRIAXone   IVPB 1000 milliGRAM(s) IV Intermittent every 24 hours  dextrose 5%. 1000 milliLiter(s) (50 mL/Hr) IV Continuous <Continuous>  dextrose 50% Injectable 12.5 Gram(s) IV Push once  dextrose 50% Injectable 25 Gram(s) IV Push once  dextrose 50% Injectable 25 Gram(s) IV Push once  enoxaparin Injectable 30 milliGRAM(s) SubCutaneous daily  finasteride 5 milliGRAM(s) Oral daily  influenza   Vaccine 0.5 milliLiter(s) IntraMuscular once  insulin lispro (HumaLOG) corrective regimen sliding scale   SubCutaneous three times a day before meals  propranolol 20 milliGRAM(s) Oral daily  senna 2 Tablet(s) Oral at bedtime  sodium chloride 0.45%. 1000 milliLiter(s) (50 mL/Hr) IV Continuous <Continuous>  tiotropium 18 MICROgram(s) Capsule 1 Capsule(s) Inhalation daily    MEDICATIONS  (PRN):  acetaminophen   Tablet .. 650 milliGRAM(s) Oral every 6 hours PRN Temp greater or equal to 38C (100.4F), Moderate Pain (4 - 6)  dextrose 40% Gel 15 Gram(s) Oral once PRN Blood Glucose LESS THAN 70 milliGRAM(s)/deciliter  glucagon  Injectable 1 milliGRAM(s) IntraMuscular once PRN Glucose LESS THAN 70 milligrams/deciliter  guaiFENesin    Syrup 100 milliGRAM(s) Oral every 6 hours PRN Cough    REVIEW OF SYSTEMS:    Constitutional:            No fever, weight loss or fatigue  HEENT:                         No difficulty hearing, tinnitus, vertigo; No sinus or throat pain  Respiratory:                sob and cough.  Cardiovascular:           No chest pain, palpitations  Gastrointestinal:        No abdominal or epigastric pain. No N/V/diarrhea or hematemesis  Genitourinary:            No dysuria, frequency, hematuria or incontinence  SKIN:                             no rash  Musculoskeletal:        No joint pain or swelling  Extremities:                No swelling  Neurological:              No headaches  Psychiatric:                 No depression, anxiety    MACRA & MIPS:  Vaccines - Influenza: no and Pneumovax: no  Tobacco:  ex smoker  Blood Pressure Screening / Control of: 134/96  Current Medications Reviewed: yes    Vital Signs Last 24 Hrs  T(C): 36.8 (2019 16:39), Max: 36.8 (2019 04:39)  T(F): 98.2 (2019 16:39), Max: 98.2 (2019 04:39)  HR: 78 (2019 16:39) (71 - 95)  BP: 117/78 (2019 16:39) (117/78 - 134/96)  BP(mean): --  RR: 19 (2019 16:39) (16 - 19)  SpO2: 100% (2019 16:39) (96% - 100%)    PHYSICAL EXAM:  GEN:         Awake, responsive and comfortable.  HEENT:      Normal.    RESP:        decreased air entry.  CVS:           irregular rate and rhythm.   ABD:         Soft, non-tender, non-distended;   :             No costovertebral angle tenderness  SKIN:           Warm and dry.  EXTR:            No clubbing, cyanosis or edema  CNS:              Intact sensory and motor function.  PSYCH:        cooperative, no anxiety or depression    LABS:                        13.9   8.87  )-----------( 176      ( 2019 07:07 )             44.5     11-08    140  |  109<H>  |  23  ----------------------------<  101<H>  4.1   |  25  |  1.35<H>    Ca    9.2      2019 07:07  Phos  3.0     11-07  Mg     1.8      @ 16:45  pH: 7.43  pCO2: 30  pO2: 97  SaO2: 98    Urinalysis Basic - ( 2019 20:08 )    Color: Yellow / Appearance: very cloudy / S.015 / pH: x  Gluc: x / Ketone: Negative  / Bili: Negative / Urobili: Negative mg/dL   Blood: x / Protein: 30 mg/dL / Nitrite: Positive   Leuk Esterase: Moderate / RBC: x / WBC >50   Sq Epi: x / Non Sq Epi: Moderate / Bacteria: Many    Culture - Blood (collected 19 @ 23:25)  Source: .Blood Blood-Peripheral  Preliminary Report (19 @ 01:02):    No growth to date.    Culture - Blood (collected 19 @ 23:25)  Source: .Blood Blood-Peripheral  Preliminary Report (19 @ 01:02):    No growth to date.    Culture - Urine (collected 19 @ 23:10)  Source: .Urine Catheterized  Preliminary Report (19 @ 19:39):    >100,000 CFU/ml Gram Negative Rods    EKG: A Fib    RADIOLOGY & ADDITIONAL STUDIES:  < from: CT Angio Chest w/ IV Cont (19 @ 17:52) >    EXAM:  CT ANGIO CHEST (W)AW IC                          PROCEDURE DATE:  2019      INTERPRETATION:  CT ANGIO CHEST WITHOUT AND OR WITH IV CONTRAST    CLINICAL INFORMATION:  short of breath with atrial fibrillation  r/o PE    TECHNIQUE: Contrast enhanced CT pulmonary angiogram was performed.   Multiplanar CT and HRCT images were reviewed. Maximum intensity   projection (MIP) images are reconstructed as per CT angiography protocol.   Images were acquired during the administration of 90 cc Omnipaque 350 IV   contrast. This study was performed using automatic exposure control   (radiation dose reduction software) to obtain a diagnostic image quality   scan with patient dose as low as reasonably achievable.    COMPARISON: same day CXR    PULMONARY ARTERIES: No pulmonary embolism to the segmental branches.   Limited visualization of subsegmental branches secondary to respiratory   motion.    LUNGS, AIRWAYS: The central airways are patent. The lungs are clear. Mild   emphysema.    PLEURA: No pleural abnormality.    VESSELS: Normal caliber aorta.     HEART: Mild cardiomegaly. No pericardial effusion.    MEDIASTINUM, TOPHER, AXILLAE: No adenopathy.    UPPER ABDOMEN: Nonobstructing renal calculi.    BONES AND CHEST WALL: No acute bony abnormality.    IMPRESSION:     No pulmonary embolism to the segmental branches. Limited visualization of   subsegmental branches secondary to respiratory motion.    No acute chest pathology.    SAE BROWN M.D., ATTENDING RADIOLOGIST  This document has been electronically signed. 2019  6:25PM     ASSESSMENT AND PLAN:  ·	SOB.  ·	Suspect COPD.  ·	UTI with gram negative rods.  ·	Renal Insuffiencey.  ·	Chronic A Fib.  ·	HTN.  ·	DM     Continue antibiotics and IV hydration.  Follow culture,  Add PRN nebulizer.

## 2019-11-08 NOTE — PROGRESS NOTE ADULT - PROBLEM SELECTOR PLAN 1
Secondary to COPD  CTA shows no acute changes.  Continue w/ NC oxygen prn   PE ruled out  Pulm follwoing

## 2019-11-08 NOTE — PROGRESS NOTE ADULT - SUBJECTIVE AND OBJECTIVE BOX
Patient is a 89y old  Male who presents with a chief complaint of Shortness of breath. (2019 17:18)      INTERVAL HPI/OVERNIGHT EVENTS:  Pt was seen and examined, no acute events.    MEDICATIONS  (STANDING):  amLODIPine   Tablet 5 milliGRAM(s) Oral daily  aspirin  chewable 81 milliGRAM(s) Oral daily  cefTRIAXone   IVPB      cefTRIAXone   IVPB 1000 milliGRAM(s) IV Intermittent every 24 hours  dextrose 5%. 1000 milliLiter(s) (50 mL/Hr) IV Continuous <Continuous>  dextrose 50% Injectable 12.5 Gram(s) IV Push once  dextrose 50% Injectable 25 Gram(s) IV Push once  dextrose 50% Injectable 25 Gram(s) IV Push once  enoxaparin Injectable 30 milliGRAM(s) SubCutaneous daily  finasteride 5 milliGRAM(s) Oral daily  influenza   Vaccine 0.5 milliLiter(s) IntraMuscular once  insulin lispro (HumaLOG) corrective regimen sliding scale   SubCutaneous three times a day before meals  propranolol 20 milliGRAM(s) Oral daily  senna 2 Tablet(s) Oral at bedtime  sodium chloride 0.45%. 1000 milliLiter(s) (50 mL/Hr) IV Continuous <Continuous>  tiotropium 18 MICROgram(s) Capsule 1 Capsule(s) Inhalation daily    MEDICATIONS  (PRN):  acetaminophen   Tablet .. 650 milliGRAM(s) Oral every 6 hours PRN Temp greater or equal to 38C (100.4F), Moderate Pain (4 - 6)  dextrose 40% Gel 15 Gram(s) Oral once PRN Blood Glucose LESS THAN 70 milliGRAM(s)/deciliter  glucagon  Injectable 1 milliGRAM(s) IntraMuscular once PRN Glucose LESS THAN 70 milligrams/deciliter  guaiFENesin    Syrup 100 milliGRAM(s) Oral every 6 hours PRN Cough      Allergies  No Known Allergies        Vital Signs Last 24 Hrs  T(C): 36.8 (2019 16:39), Max: 36.8 (2019 04:39)  T(F): 98.2 (2019 16:39), Max: 98.2 (2019 04:39)  HR: 78 (2019 16:39) (71 - 95)  BP: 117/78 (2019 16:39) (117/78 - 134/96)  BP(mean): --  RR: 19 (2019 16:39) (16 - 19)  SpO2: 100% (2019 16:39) (96% - 100%)    PHYSICAL EXAM:  GENERAL: NAD  HEAD:  Atraumatic  EYES: PERRLA  NERVOUS SYSTEM:  Awake, alert  CHEST/LUNG: Clear  HEART: RRR  ABDOMEN: Soft, non tender  EXTREMITIES:  no edema      LABS:                        13.9   8.87  )-----------( 176      ( 2019 07:07 )             44.5     11-08    140  |  109<H>  |  23  ----------------------------<  101<H>  4.1   |  25  |  1.35<H>    Ca    9.2      2019 07:07  Phos  3.0     11  Mg     1.8     1107        Urinalysis Basic - ( 2019 20:08 )    Color: Yellow / Appearance: very cloudy / S.015 / pH: x  Gluc: x / Ketone: Negative  / Bili: Negative / Urobili: Negative mg/dL   Blood: x / Protein: 30 mg/dL / Nitrite: Positive   Leuk Esterase: Moderate / RBC: x / WBC >50   Sq Epi: x / Non Sq Epi: Moderate / Bacteria: Many      CAPILLARY BLOOD GLUCOSE      POCT Blood Glucose.: 117 mg/dL (2019 16:24)  POCT Blood Glucose.: 128 mg/dL (2019 11:34)  POCT Blood Glucose.: 80 mg/dL (2019 07:29)  POCT Blood Glucose.: 115 mg/dL (2019 21:41)      Culture - Blood (collected 2019 23:25)  Source: .Blood Blood-Peripheral  Preliminary Report (2019 01:02):    No growth to date.    Culture - Blood (collected 2019 23:25)  Source: .Blood Blood-Peripheral  Preliminary Report (2019 01:02):    No growth to date.    Culture - Urine (collected 2019 23:10)  Source: .Urine Catheterized  Final Report (2019 18:47):    >100,000 CFU/ml Providencia stuartii  Organism: Providencia stuartii (2019 18:47)  Organism: Providencia stuartii (2019 18:47)      RADIOLOGY & ADDITIONAL TESTS:    Imaging Personally Reviewed:  [ ] YES  [ ] NO    Consultant(s) Notes Reviewed:  [ ] YES  [ ] NO    Care Discussed with Consultants/Other Providers [ ] YES  [ ] NO

## 2019-11-08 NOTE — PROGRESS NOTE ADULT - SUBJECTIVE AND OBJECTIVE BOX
[facility] NEPHROLOGY SERVICES, [facility]  NEPHROLOGY AND HYPERTENSION  [address]  [address]  [place] [address]  [phone]    MD ROE PEREZ MD ANDREY GONCHARUK, MD MADHU KORRAPATI, MD YELENA ROSENBERG, MD BINNY KOSHY, MD CHRISTOPHER CAPUTO, MD ANGELA MONTENEGRO MD          Patient feels well no complaints today.    MEDICATIONS  (STANDING):  amLODIPine   Tablet 5 milliGRAM(s) Oral daily  aspirin  chewable 81 milliGRAM(s) Oral daily  cefTRIAXone   IVPB      cefTRIAXone   IVPB 1000 milliGRAM(s) IV Intermittent every 24 hours  dextrose 5%. 1000 milliLiter(s) (50 mL/Hr) IV Continuous <Continuous>  dextrose 50% Injectable 12.5 Gram(s) IV Push once  dextrose 50% Injectable 25 Gram(s) IV Push once  dextrose 50% Injectable 25 Gram(s) IV Push once  enoxaparin Injectable 30 milliGRAM(s) SubCutaneous daily  finasteride 5 milliGRAM(s) Oral daily  influenza   Vaccine 0.5 milliLiter(s) IntraMuscular once  insulin lispro (HumaLOG) corrective regimen sliding scale   SubCutaneous three times a day before meals  propranolol 20 milliGRAM(s) Oral daily  senna 2 Tablet(s) Oral at bedtime  sodium chloride 0.45%. 1000 milliLiter(s) (50 mL/Hr) IV Continuous <Continuous>  tiotropium 18 MICROgram(s) Capsule 1 Capsule(s) Inhalation daily    MEDICATIONS  (PRN):  acetaminophen   Tablet .. 650 milliGRAM(s) Oral every 6 hours PRN Temp greater or equal to 38C (100.4F), Moderate Pain (4 - 6)  dextrose 40% Gel 15 Gram(s) Oral once PRN Blood Glucose LESS THAN 70 milliGRAM(s)/deciliter  glucagon  Injectable 1 milliGRAM(s) IntraMuscular once PRN Glucose LESS THAN 70 milligrams/deciliter  guaiFENesin    Syrup 100 milliGRAM(s) Oral every 6 hours PRN Cough      11-07-19 @ 07:01  -  11-08-19 @ 07:00  --------------------------------------------------------  IN: 800 mL / OUT: 600 mL / NET: 200 mL    11-08-19 @ 07:01  -  11-08-19 @ 19:26  --------------------------------------------------------  IN: 360 mL / OUT: 400 mL / NET: -40 mL      PHYSICAL EXAM:      T(C): 36.8 (11-08-19 @ 16:39), Max: 36.8 (11-08-19 @ 04:39)  HR: 78 (11-08-19 @ 16:39) (71 - 95)  BP: 117/78 (11-08-19 @ 16:39) (117/78 - 134/96)  RR: 19 (11-08-19 @ 16:39) (16 - 19)  SpO2: 100% (11-08-19 @ 16:39) (96% - 100%)  Wt(kg): --  Respiratory: clear anteriorly, decreased BS at bases  Cardiovascular: S1 S2  Gastrointestinal: soft NT ND +BS  Extremities:  tr edema                                    13.9   8.87  )-----------( 176      ( 08 Nov 2019 07:07 )             44.5     11-08    140  |  109<H>  |  23  ----------------------------<  101<H>  4.1   |  25  |  1.35<H>    Ca    9.2      08 Nov 2019 07:07  Phos  3.0     11-07  Mg     1.8     11-07          Creatinine Trend: 1.35<--, 1.40<--, 1.74<--    Impression:   LETICIA -- pre-renal';  Patient post IV contrast  CKD3. Cr 1.2-1.3 baseline  COPD    Recommendations:     Can d/c IVF;   Will follow.  Marcel Sanders MD

## 2019-11-08 NOTE — DIETITIAN INITIAL EVALUATION ADULT. - PERTINENT MEDS FT
MEDICATIONS  (STANDING):  amLODIPine   Tablet 5 milliGRAM(s) Oral daily  aspirin  chewable 81 milliGRAM(s) Oral daily  cefTRIAXone   IVPB      cefTRIAXone   IVPB 1000 milliGRAM(s) IV Intermittent every 24 hours  dextrose 5%. 1000 milliLiter(s) (50 mL/Hr) IV Continuous <Continuous>  dextrose 50% Injectable 12.5 Gram(s) IV Push once  dextrose 50% Injectable 25 Gram(s) IV Push once  dextrose 50% Injectable 25 Gram(s) IV Push once  enoxaparin Injectable 30 milliGRAM(s) SubCutaneous daily  finasteride 5 milliGRAM(s) Oral daily  influenza   Vaccine 0.5 milliLiter(s) IntraMuscular once  insulin lispro (HumaLOG) corrective regimen sliding scale   SubCutaneous three times a day before meals  propranolol 20 milliGRAM(s) Oral daily  senna 2 Tablet(s) Oral at bedtime  sodium chloride 0.45%. 1000 milliLiter(s) (50 mL/Hr) IV Continuous <Continuous>  tiotropium 18 MICROgram(s) Capsule 1 Capsule(s) Inhalation daily    MEDICATIONS  (PRN):  acetaminophen   Tablet .. 650 milliGRAM(s) Oral every 6 hours PRN Temp greater or equal to 38C (100.4F), Moderate Pain (4 - 6)  dextrose 40% Gel 15 Gram(s) Oral once PRN Blood Glucose LESS THAN 70 milliGRAM(s)/deciliter  glucagon  Injectable 1 milliGRAM(s) IntraMuscular once PRN Glucose LESS THAN 70 milligrams/deciliter  guaiFENesin    Syrup 100 milliGRAM(s) Oral every 6 hours PRN Cough

## 2019-11-09 LAB
ANION GAP SERPL CALC-SCNC: 5 MMOL/L — SIGNIFICANT CHANGE UP (ref 5–17)
BUN SERPL-MCNC: 21 MG/DL — SIGNIFICANT CHANGE UP (ref 7–23)
CALCIUM SERPL-MCNC: 9.5 MG/DL — SIGNIFICANT CHANGE UP (ref 8.5–10.1)
CHLORIDE SERPL-SCNC: 108 MMOL/L — SIGNIFICANT CHANGE UP (ref 96–108)
CO2 SERPL-SCNC: 26 MMOL/L — SIGNIFICANT CHANGE UP (ref 22–31)
CREAT SERPL-MCNC: 1.28 MG/DL — SIGNIFICANT CHANGE UP (ref 0.5–1.3)
GLUCOSE SERPL-MCNC: 100 MG/DL — HIGH (ref 70–99)
HCT VFR BLD CALC: 43.9 % — SIGNIFICANT CHANGE UP (ref 39–50)
HGB BLD-MCNC: 14 G/DL — SIGNIFICANT CHANGE UP (ref 13–17)
MCHC RBC-ENTMCNC: 28.6 PG — SIGNIFICANT CHANGE UP (ref 27–34)
MCHC RBC-ENTMCNC: 31.9 GM/DL — LOW (ref 32–36)
MCV RBC AUTO: 89.6 FL — SIGNIFICANT CHANGE UP (ref 80–100)
NRBC # BLD: 0 /100 WBCS — SIGNIFICANT CHANGE UP (ref 0–0)
PLATELET # BLD AUTO: 191 K/UL — SIGNIFICANT CHANGE UP (ref 150–400)
POTASSIUM SERPL-MCNC: 4.4 MMOL/L — SIGNIFICANT CHANGE UP (ref 3.5–5.3)
POTASSIUM SERPL-SCNC: 4.4 MMOL/L — SIGNIFICANT CHANGE UP (ref 3.5–5.3)
RBC # BLD: 4.9 M/UL — SIGNIFICANT CHANGE UP (ref 4.2–5.8)
RBC # FLD: 14 % — SIGNIFICANT CHANGE UP (ref 10.3–14.5)
SODIUM SERPL-SCNC: 139 MMOL/L — SIGNIFICANT CHANGE UP (ref 135–145)
WBC # BLD: 9.24 K/UL — SIGNIFICANT CHANGE UP (ref 3.8–10.5)
WBC # FLD AUTO: 9.24 K/UL — SIGNIFICANT CHANGE UP (ref 3.8–10.5)

## 2019-11-09 PROCEDURE — 99232 SBSQ HOSP IP/OBS MODERATE 35: CPT

## 2019-11-09 RX ADMIN — AMLODIPINE BESYLATE 5 MILLIGRAM(S): 2.5 TABLET ORAL at 05:40

## 2019-11-09 RX ADMIN — Medication 2: at 11:29

## 2019-11-09 RX ADMIN — CEFTRIAXONE 100 MILLIGRAM(S): 500 INJECTION, POWDER, FOR SOLUTION INTRAMUSCULAR; INTRAVENOUS at 21:07

## 2019-11-09 RX ADMIN — ENOXAPARIN SODIUM 30 MILLIGRAM(S): 100 INJECTION SUBCUTANEOUS at 11:30

## 2019-11-09 RX ADMIN — TIOTROPIUM BROMIDE 1 CAPSULE(S): 18 CAPSULE ORAL; RESPIRATORY (INHALATION) at 16:55

## 2019-11-09 RX ADMIN — FINASTERIDE 5 MILLIGRAM(S): 5 TABLET, FILM COATED ORAL at 11:31

## 2019-11-09 RX ADMIN — Medication 20 MILLIGRAM(S): at 05:40

## 2019-11-09 RX ADMIN — SENNA PLUS 2 TABLET(S): 8.6 TABLET ORAL at 21:07

## 2019-11-09 RX ADMIN — Medication 81 MILLIGRAM(S): at 11:31

## 2019-11-09 NOTE — PROGRESS NOTE ADULT - SUBJECTIVE AND OBJECTIVE BOX
INTERVAL HPI:  89 year old male with HTN, DM2, Atrial fibrillation,  brought  with family c/o dry cough for three weeks with sob today and occasional chills. No fever, URI or sputum production  Denies headache, dizziness, blurred vision, light sensitivity, focal/distal weakness or numbness, palpitations, chest pain, nausea, vomiting, abd pain. Pt is very poor historian.   Smoked close to 55 years, quit 25 years ago.    OVERNIGHT EVENTS:  Awake and comfortable    Vital Signs Last 24 Hrs  T(C): 36.4 (09 Nov 2019 11:15), Max: 36.8 (08 Nov 2019 16:39)  T(F): 97.6 (09 Nov 2019 11:15), Max: 98.2 (08 Nov 2019 16:39)  HR: 88 (09 Nov 2019 11:15) (78 - 91)  BP: 125/86 (09 Nov 2019 11:15) (115/80 - 129/88)  BP(mean): --  RR: 18 (09 Nov 2019 11:15) (16 - 19)  SpO2: 100% (09 Nov 2019 11:15) (99% - 100%)    PHYSICAL EXAM:  GEN:         Awake, responsive and comfortable.  HEENT:    Normal.    RESP:        no wheezing.  CVS:          Regular rate and rhythm.   ABD:         Soft, non-tender, non-distended;     MEDICATIONS  (STANDING):  amLODIPine   Tablet 5 milliGRAM(s) Oral daily  aspirin  chewable 81 milliGRAM(s) Oral daily  cefTRIAXone   IVPB      cefTRIAXone   IVPB 1000 milliGRAM(s) IV Intermittent every 24 hours  dextrose 5%. 1000 milliLiter(s) (50 mL/Hr) IV Continuous <Continuous>  dextrose 50% Injectable 12.5 Gram(s) IV Push once  dextrose 50% Injectable 25 Gram(s) IV Push once  dextrose 50% Injectable 25 Gram(s) IV Push once  enoxaparin Injectable 30 milliGRAM(s) SubCutaneous daily  finasteride 5 milliGRAM(s) Oral daily  influenza   Vaccine 0.5 milliLiter(s) IntraMuscular once  insulin lispro (HumaLOG) corrective regimen sliding scale   SubCutaneous three times a day before meals  propranolol 20 milliGRAM(s) Oral daily  senna 2 Tablet(s) Oral at bedtime  tiotropium 18 MICROgram(s) Capsule 1 Capsule(s) Inhalation daily    MEDICATIONS  (PRN):  acetaminophen   Tablet .. 650 milliGRAM(s) Oral every 6 hours PRN Temp greater or equal to 38C (100.4F), Moderate Pain (4 - 6)  dextrose 40% Gel 15 Gram(s) Oral once PRN Blood Glucose LESS THAN 70 milliGRAM(s)/deciliter  glucagon  Injectable 1 milliGRAM(s) IntraMuscular once PRN Glucose LESS THAN 70 milligrams/deciliter  guaiFENesin    Syrup 100 milliGRAM(s) Oral every 6 hours PRN Cough    LABS:                        14.0   9.24  )-----------( 191      ( 09 Nov 2019 07:57 )             43.9     11-09    139  |  108  |  21  ----------------------------<  100<H>  4.4   |  26  |  1.28    Ca    9.5      09 Nov 2019 07:57    11-06 @ 16:45  pH: 7.43  pCO2: 30  pO2: 97  SaO2: 98  ASSESSMENT AND PLAN:  ·	SOB.  ·	Suspect COPD.  ·	UTI with gram negative rods.  ·	Renal Insuffiencey.  ·	Chronic A Fib.  ·	HTN.  ·	DM     Continue antibiotics and IV hydration.  Follow culture,  PRN nebulizer.

## 2019-11-09 NOTE — PROGRESS NOTE ADULT - SUBJECTIVE AND OBJECTIVE BOX
Patient is a 89y old  Male who presents with a chief complaint of Shortness of breath. (08 Nov 2019 17:18)      INTERVAL HPI/OVERNIGHT EVENTS:  Pt was seen and examined, no acute events.    T(C): 36.4 (11-09-19 @ 11:15), Max: 36.4 (11-09-19 @ 04:50)  HR: 88 (11-09-19 @ 11:15) (83 - 88)  BP: 125/86 (11-09-19 @ 11:15) (115/80 - 125/86)  RR: 18 (11-09-19 @ 11:15) (18 - 18)  SpO2: 100% (11-09-19 @ 11:15) (100% - 100%)      MEDICATIONS  (STANDING):  amLODIPine   Tablet 5 milliGRAM(s) Oral daily  aspirin  chewable 81 milliGRAM(s) Oral daily  cefTRIAXone   IVPB      cefTRIAXone   IVPB 1000 milliGRAM(s) IV Intermittent every 24 hours  dextrose 5%. 1000 milliLiter(s) (50 mL/Hr) IV Continuous <Continuous>  dextrose 50% Injectable 12.5 Gram(s) IV Push once  dextrose 50% Injectable 25 Gram(s) IV Push once  dextrose 50% Injectable 25 Gram(s) IV Push once  enoxaparin Injectable 30 milliGRAM(s) SubCutaneous daily  finasteride 5 milliGRAM(s) Oral daily  influenza   Vaccine 0.5 milliLiter(s) IntraMuscular once  insulin lispro (HumaLOG) corrective regimen sliding scale   SubCutaneous three times a day before meals  propranolol 20 milliGRAM(s) Oral daily  senna 2 Tablet(s) Oral at bedtime  tiotropium 18 MICROgram(s) Capsule 1 Capsule(s) Inhalation daily    MEDICATIONS  (PRN):  acetaminophen   Tablet .. 650 milliGRAM(s) Oral every 6 hours PRN Temp greater or equal to 38C (100.4F), Moderate Pain (4 - 6)  dextrose 40% Gel 15 Gram(s) Oral once PRN Blood Glucose LESS THAN 70 milliGRAM(s)/deciliter  glucagon  Injectable 1 milliGRAM(s) IntraMuscular once PRN Glucose LESS THAN 70 milligrams/deciliter  guaiFENesin    Syrup 100 milliGRAM(s) Oral every 6 hours PRN Cough    PHYSICAL EXAM:  GENERAL: NAD  HEAD:  Atraumatic  EYES: EOMI   NERVOUS SYSTEM:  Awake, alert  CHEST/LUNG: Clear  HEART: RRR  ABDOMEN: Soft, non tender  EXTREMITIES:  no edema                          14.0   9.24  )-----------( 191      ( 09 Nov 2019 07:57 )             43.9               139|108|21<100  4.4|26|1.28  9.5,--,--  11-09 @ 07:57    CAPILLARY BLOOD GLUCOSE      POCT Blood Glucose.: 198 mg/dL (09 Nov 2019 11:17)  POCT Blood Glucose.: 101 mg/dL (09 Nov 2019 07:26)  POCT Blood Glucose.: 117 mg/dL (08 Nov 2019 16:24)      Culture - Blood (collected 06 Nov 2019 23:25)  Source: .Blood Blood-Peripheral  Preliminary Report (08 Nov 2019 01:02):    No growth to date.    Culture - Blood (collected 06 Nov 2019 23:25)  Source: .Blood Blood-Peripheral  Preliminary Report (08 Nov 2019 01:02):    No growth to date.    Culture - Urine (collected 06 Nov 2019 23:10)  Source: .Urine Catheterized  Final Report (08 Nov 2019 18:47):    >100,000 CFU/ml Providencia stuartii  Organism: Providencia stuartii (08 Nov 2019 18:47)  Organism: Providencia stuartii (08 Nov 2019 18:47)      RADIOLOGY & ADDITIONAL TESTS:    Imaging Personally Reviewed:  [ ] YES  [ ] NO    Consultant(s) Notes Reviewed:  [ ] YES  [ ] NO    Care Discussed with Consultants/Other Providers [ ] YES  [ ] NO

## 2019-11-10 DIAGNOSIS — K59.00 CONSTIPATION, UNSPECIFIED: ICD-10-CM

## 2019-11-10 PROCEDURE — 99232 SBSQ HOSP IP/OBS MODERATE 35: CPT

## 2019-11-10 RX ORDER — LACTULOSE 10 G/15ML
10 SOLUTION ORAL ONCE
Refills: 0 | Status: COMPLETED | OUTPATIENT
Start: 2019-11-10 | End: 2019-11-11

## 2019-11-10 RX ADMIN — CEFTRIAXONE 100 MILLIGRAM(S): 500 INJECTION, POWDER, FOR SOLUTION INTRAMUSCULAR; INTRAVENOUS at 21:24

## 2019-11-10 RX ADMIN — TIOTROPIUM BROMIDE 1 CAPSULE(S): 18 CAPSULE ORAL; RESPIRATORY (INHALATION) at 10:50

## 2019-11-10 RX ADMIN — Medication 81 MILLIGRAM(S): at 10:50

## 2019-11-10 RX ADMIN — SENNA PLUS 2 TABLET(S): 8.6 TABLET ORAL at 21:25

## 2019-11-10 RX ADMIN — Medication 30 MILLILITER(S): at 10:45

## 2019-11-10 RX ADMIN — Medication 20 MILLIGRAM(S): at 05:18

## 2019-11-10 RX ADMIN — ENOXAPARIN SODIUM 30 MILLIGRAM(S): 100 INJECTION SUBCUTANEOUS at 10:50

## 2019-11-10 RX ADMIN — FINASTERIDE 5 MILLIGRAM(S): 5 TABLET, FILM COATED ORAL at 10:50

## 2019-11-10 RX ADMIN — Medication 2: at 10:50

## 2019-11-10 NOTE — PROGRESS NOTE ADULT - SUBJECTIVE AND OBJECTIVE BOX
Patient is a 89y old  Male who presents with a chief complaint of Shortness of breath. (08 Nov 2019 17:18)      INTERVAL HPI/OVERNIGHT EVENTS:  Pt was seen and examined, no acute events.  Patient reports abdominal discomfort and constipation     T(C): 36.6 (11-10-19 @ 04:46), Max: 36.9 (11-09-19 @ 23:25)  HR: 98 (11-10-19 @ 04:46) (96 - 98)  BP: 116/77 (11-10-19 @ 04:46) (100/79 - 116/77)  RR: 17 (11-10-19 @ 04:46) (17 - 17)  SpO2: 98% (11-10-19 @ 04:46) (98% - 98%)      MEDICATIONS  (STANDING):  amLODIPine   Tablet 5 milliGRAM(s) Oral daily  aspirin  chewable 81 milliGRAM(s) Oral daily  cefTRIAXone   IVPB      cefTRIAXone   IVPB 1000 milliGRAM(s) IV Intermittent every 24 hours  dextrose 5%. 1000 milliLiter(s) (50 mL/Hr) IV Continuous <Continuous>  dextrose 50% Injectable 12.5 Gram(s) IV Push once  dextrose 50% Injectable 25 Gram(s) IV Push once  dextrose 50% Injectable 25 Gram(s) IV Push once  enoxaparin Injectable 30 milliGRAM(s) SubCutaneous daily  finasteride 5 milliGRAM(s) Oral daily  influenza   Vaccine 0.5 milliLiter(s) IntraMuscular once  insulin lispro (HumaLOG) corrective regimen sliding scale   SubCutaneous three times a day before meals  lactulose Syrup 10 Gram(s) Oral once  propranolol 20 milliGRAM(s) Oral daily  senna 2 Tablet(s) Oral at bedtime  tiotropium 18 MICROgram(s) Capsule 1 Capsule(s) Inhalation daily    MEDICATIONS  (PRN):  acetaminophen   Tablet .. 650 milliGRAM(s) Oral every 6 hours PRN Temp greater or equal to 38C (100.4F), Moderate Pain (4 - 6)  aluminum hydroxide/magnesium hydroxide/simethicone Suspension 30 milliLiter(s) Oral once PRN Dyspepsia  dextrose 40% Gel 15 Gram(s) Oral once PRN Blood Glucose LESS THAN 70 milliGRAM(s)/deciliter  glucagon  Injectable 1 milliGRAM(s) IntraMuscular once PRN Glucose LESS THAN 70 milligrams/deciliter  guaiFENesin    Syrup 100 milliGRAM(s) Oral every 6 hours PRN Cough      PHYSICAL EXAM:  GENERAL: NAD  HEAD:  Atraumatic  EYES: EOMI   NERVOUS SYSTEM:  Awake, alert  CHEST/LUNG: Clear  HEART: RRR  ABDOMEN: Soft, non tender  EXTREMITIES:  no edema                             14.0   9.24  )-----------( 191      ( 09 Nov 2019 07:57 )             43.9                 CAPILLARY BLOOD GLUCOSE    CAPILLARY BLOOD GLUCOSE      POCT Blood Glucose.: 100 mg/dL (10 Nov 2019 07:35)  POCT Blood Glucose.: 140 mg/dL (09 Nov 2019 21:11)  POCT Blood Glucose.: 107 mg/dL (09 Nov 2019 16:54)  POCT Blood Glucose.: 198 mg/dL (09 Nov 2019 11:17)      Culture - Blood (collected 06 Nov 2019 23:25)  Source: .Blood Blood-Peripheral  Preliminary Report (08 Nov 2019 01:02):    No growth to date.    Culture - Blood (collected 06 Nov 2019 23:25)  Source: .Blood Blood-Peripheral  Preliminary Report (08 Nov 2019 01:02):    No growth to date.    Culture - Urine (collected 06 Nov 2019 23:10)  Source: .Urine Catheterized  Final Report (08 Nov 2019 18:47):    >100,000 CFU/ml Providencia stuartii  Organism: Providencia stuartii (08 Nov 2019 18:47)  Organism: Providencia stuartii (08 Nov 2019 18:47)      RADIOLOGY & ADDITIONAL TESTS:    Imaging Personally Reviewed:  [ ] YES  [ ] NO    Consultant(s) Notes Reviewed:  [ ] YES  [ ] NO    Care Discussed with Consultants/Other Providers [ ] YES  [ ] NO

## 2019-11-10 NOTE — PROGRESS NOTE ADULT - SUBJECTIVE AND OBJECTIVE BOX
INTERVAL HPI:  89 year old male with HTN, DM2, Atrial fibrillation,  brought  with family c/o dry cough for three weeks with sob today and occasional chills. No fever, URI or sputum production  Denies headache, dizziness, blurred vision, light sensitivity, focal/distal weakness or numbness, palpitations, chest pain, nausea, vomiting, abd pain. Pt is very poor historian.   Smoked close to 55 years, quit 25 years ago.    OVERNIGHT EVENTS:  Reports abdominal discomfort.    Vital Signs Last 24 Hrs  T(C): 36.7 (10 Nov 2019 11:51), Max: 36.9 (09 Nov 2019 23:25)  T(F): 98 (10 Nov 2019 11:51), Max: 98.5 (09 Nov 2019 23:25)  HR: 97 (10 Nov 2019 11:51) (72 - 98)  BP: 107/71 (10 Nov 2019 11:51) (100/79 - 120/75)  BP(mean): --  RR: 18 (10 Nov 2019 11:51) (17 - 18)  SpO2: 98% (10 Nov 2019 11:51) (98% - 100%)    PHYSICAL EXAM:  GEN:         Awake, responsive and comfortable.  HEENT:    Normal.    RESP:       no wheezing.  CVS:           Regular rate and rhythm.   ABD:         Soft, non-tender, non-distended;     MEDICATIONS  (STANDING):  amLODIPine   Tablet 5 milliGRAM(s) Oral daily  aspirin  chewable 81 milliGRAM(s) Oral daily  cefTRIAXone   IVPB      cefTRIAXone   IVPB 1000 milliGRAM(s) IV Intermittent every 24 hours  dextrose 5%. 1000 milliLiter(s) (50 mL/Hr) IV Continuous <Continuous>  dextrose 50% Injectable 12.5 Gram(s) IV Push once  dextrose 50% Injectable 25 Gram(s) IV Push once  dextrose 50% Injectable 25 Gram(s) IV Push once  enoxaparin Injectable 30 milliGRAM(s) SubCutaneous daily  finasteride 5 milliGRAM(s) Oral daily  influenza   Vaccine 0.5 milliLiter(s) IntraMuscular once  insulin lispro (HumaLOG) corrective regimen sliding scale   SubCutaneous three times a day before meals  lactulose Syrup 10 Gram(s) Oral once  propranolol 20 milliGRAM(s) Oral daily  senna 2 Tablet(s) Oral at bedtime  tiotropium 18 MICROgram(s) Capsule 1 Capsule(s) Inhalation daily    MEDICATIONS  (PRN):  acetaminophen   Tablet .. 650 milliGRAM(s) Oral every 6 hours PRN Temp greater or equal to 38C (100.4F), Moderate Pain (4 - 6)  dextrose 40% Gel 15 Gram(s) Oral once PRN Blood Glucose LESS THAN 70 milliGRAM(s)/deciliter  glucagon  Injectable 1 milliGRAM(s) IntraMuscular once PRN Glucose LESS THAN 70 milligrams/deciliter  guaiFENesin    Syrup 100 milliGRAM(s) Oral every 6 hours PRN Cough    LABS:                        14.0   9.24  )-----------( 191      ( 09 Nov 2019 07:57 )             43.9     11-09    139  |  108  |  21  ----------------------------<  100<H>  4.4   |  26  |  1.28    Ca    9.5      09 Nov 2019 07:57    11-06 @ 16:45  pH: 7.43  pCO2: 30  pO2: 97  SaO2: 98    ASSESSMENT AND PLAN:  ·	SOB.  ·	Suspect COPD.  ·	UTI with gram negative rods.  ·	Renal Insuffiencey.  ·	Chronic A Fib.  ·	HTN.  ·	DM     Abdominal discomfort likely from constipation.  Nebulizer PRN

## 2019-11-11 LAB
ANION GAP SERPL CALC-SCNC: 5 MMOL/L — SIGNIFICANT CHANGE UP (ref 5–17)
BUN SERPL-MCNC: 28 MG/DL — HIGH (ref 7–23)
CALCIUM SERPL-MCNC: 9.3 MG/DL — SIGNIFICANT CHANGE UP (ref 8.5–10.1)
CHLORIDE SERPL-SCNC: 106 MMOL/L — SIGNIFICANT CHANGE UP (ref 96–108)
CO2 SERPL-SCNC: 26 MMOL/L — SIGNIFICANT CHANGE UP (ref 22–31)
CREAT SERPL-MCNC: 1.35 MG/DL — HIGH (ref 0.5–1.3)
GLUCOSE SERPL-MCNC: 104 MG/DL — HIGH (ref 70–99)
MAGNESIUM SERPL-MCNC: 2.1 MG/DL — SIGNIFICANT CHANGE UP (ref 1.6–2.6)
PHOSPHATE SERPL-MCNC: 2.9 MG/DL — SIGNIFICANT CHANGE UP (ref 2.5–4.5)
POTASSIUM SERPL-MCNC: 4.4 MMOL/L — SIGNIFICANT CHANGE UP (ref 3.5–5.3)
POTASSIUM SERPL-SCNC: 4.4 MMOL/L — SIGNIFICANT CHANGE UP (ref 3.5–5.3)
SODIUM SERPL-SCNC: 137 MMOL/L — SIGNIFICANT CHANGE UP (ref 135–145)

## 2019-11-11 PROCEDURE — 99232 SBSQ HOSP IP/OBS MODERATE 35: CPT

## 2019-11-11 PROCEDURE — 93010 ELECTROCARDIOGRAM REPORT: CPT

## 2019-11-11 PROCEDURE — 74018 RADEX ABDOMEN 1 VIEW: CPT | Mod: 26

## 2019-11-11 RX ORDER — IPRATROPIUM/ALBUTEROL SULFATE 18-103MCG
3 AEROSOL WITH ADAPTER (GRAM) INHALATION EVERY 6 HOURS
Refills: 0 | Status: DISCONTINUED | OUTPATIENT
Start: 2019-11-11 | End: 2019-11-16

## 2019-11-11 RX ORDER — METOPROLOL TARTRATE 50 MG
2.5 TABLET ORAL ONCE
Refills: 0 | Status: COMPLETED | OUTPATIENT
Start: 2019-11-11 | End: 2019-11-11

## 2019-11-11 RX ORDER — BUDESONIDE AND FORMOTEROL FUMARATE DIHYDRATE 160; 4.5 UG/1; UG/1
2 AEROSOL RESPIRATORY (INHALATION)
Refills: 0 | Status: DISCONTINUED | OUTPATIENT
Start: 2019-11-11 | End: 2019-11-16

## 2019-11-11 RX ORDER — SODIUM CHLORIDE 9 MG/ML
500 INJECTION INTRAMUSCULAR; INTRAVENOUS; SUBCUTANEOUS ONCE
Refills: 0 | Status: COMPLETED | OUTPATIENT
Start: 2019-11-11 | End: 2019-11-11

## 2019-11-11 RX ORDER — LACTULOSE 10 G/15ML
10 SOLUTION ORAL ONCE
Refills: 0 | Status: COMPLETED | OUTPATIENT
Start: 2019-11-11 | End: 2019-11-11

## 2019-11-11 RX ORDER — TRAMADOL HYDROCHLORIDE 50 MG/1
50 TABLET ORAL EVERY 8 HOURS
Refills: 0 | Status: DISCONTINUED | OUTPATIENT
Start: 2019-11-11 | End: 2019-11-16

## 2019-11-11 RX ORDER — SODIUM CHLORIDE 9 MG/ML
1000 INJECTION INTRAMUSCULAR; INTRAVENOUS; SUBCUTANEOUS
Refills: 0 | Status: DISCONTINUED | OUTPATIENT
Start: 2019-11-11 | End: 2019-11-16

## 2019-11-11 RX ORDER — MULTIVIT WITH MIN/MFOLATE/K2 340-15/3 G
1 POWDER (GRAM) ORAL ONCE
Refills: 0 | Status: COMPLETED | OUTPATIENT
Start: 2019-11-11 | End: 2019-11-11

## 2019-11-11 RX ORDER — POLYETHYLENE GLYCOL 3350 17 G/17G
17 POWDER, FOR SOLUTION ORAL ONCE
Refills: 0 | Status: COMPLETED | OUTPATIENT
Start: 2019-11-11 | End: 2019-11-11

## 2019-11-11 RX ADMIN — LACTULOSE 10 GRAM(S): 10 SOLUTION ORAL at 05:51

## 2019-11-11 RX ADMIN — SODIUM CHLORIDE 500 MILLILITER(S): 9 INJECTION INTRAMUSCULAR; INTRAVENOUS; SUBCUTANEOUS at 19:59

## 2019-11-11 RX ADMIN — Medication 1 ENEMA: at 17:04

## 2019-11-11 RX ADMIN — TRAMADOL HYDROCHLORIDE 50 MILLIGRAM(S): 50 TABLET ORAL at 17:03

## 2019-11-11 RX ADMIN — TIOTROPIUM BROMIDE 1 CAPSULE(S): 18 CAPSULE ORAL; RESPIRATORY (INHALATION) at 11:11

## 2019-11-11 RX ADMIN — Medication 3 MILLILITER(S): at 17:08

## 2019-11-11 RX ADMIN — Medication 20 MILLIGRAM(S): at 05:51

## 2019-11-11 RX ADMIN — AMLODIPINE BESYLATE 5 MILLIGRAM(S): 2.5 TABLET ORAL at 11:14

## 2019-11-11 RX ADMIN — Medication 81 MILLIGRAM(S): at 11:11

## 2019-11-11 RX ADMIN — SENNA PLUS 2 TABLET(S): 8.6 TABLET ORAL at 21:11

## 2019-11-11 RX ADMIN — Medication 2.5 MILLIGRAM(S): at 19:52

## 2019-11-11 RX ADMIN — CEFTRIAXONE 100 MILLIGRAM(S): 500 INJECTION, POWDER, FOR SOLUTION INTRAMUSCULAR; INTRAVENOUS at 21:11

## 2019-11-11 RX ADMIN — Medication 100 MILLIGRAM(S): at 17:03

## 2019-11-11 RX ADMIN — BUDESONIDE AND FORMOTEROL FUMARATE DIHYDRATE 2 PUFF(S): 160; 4.5 AEROSOL RESPIRATORY (INHALATION) at 17:46

## 2019-11-11 RX ADMIN — Medication 1 BOTTLE: at 19:53

## 2019-11-11 RX ADMIN — Medication 10 MILLIGRAM(S): at 15:54

## 2019-11-11 RX ADMIN — POLYETHYLENE GLYCOL 3350 17 GRAM(S): 17 POWDER, FOR SOLUTION ORAL at 18:26

## 2019-11-11 RX ADMIN — SODIUM CHLORIDE 50 MILLILITER(S): 9 INJECTION INTRAMUSCULAR; INTRAVENOUS; SUBCUTANEOUS at 19:53

## 2019-11-11 RX ADMIN — LACTULOSE 10 GRAM(S): 10 SOLUTION ORAL at 18:26

## 2019-11-11 RX ADMIN — Medication 3 MILLILITER(S): at 23:55

## 2019-11-11 RX ADMIN — TRAMADOL HYDROCHLORIDE 50 MILLIGRAM(S): 50 TABLET ORAL at 17:45

## 2019-11-11 RX ADMIN — FINASTERIDE 5 MILLIGRAM(S): 5 TABLET, FILM COATED ORAL at 11:11

## 2019-11-11 RX ADMIN — ENOXAPARIN SODIUM 30 MILLIGRAM(S): 100 INJECTION SUBCUTANEOUS at 11:11

## 2019-11-11 NOTE — PROGRESS NOTE ADULT - PROBLEM SELECTOR PLAN 2
Urine cultures show Providencia, continue Ceftriaxone- day 5, change to po abx am tomorrow, d/c planning am tomorrow.

## 2019-11-11 NOTE — PROGRESS NOTE ADULT - SUBJECTIVE AND OBJECTIVE BOX
INTERVAL HPI:  89 year old male with HTN, DM2, Atrial fibrillation, Current smoker per wife,  brought  with family c/o dry cough for three weeks with sob today and occasional chills. No fever, URI or sputum production  Denies headache, dizziness, blurred vision, light sensitivity, focal/distal weakness or numbness, palpitations, chest pain, nausea, vomiting, abd pain. Pt is very poor historian.   Smoked close to 55 years, quit 25 years ago.    OVERNIGHT EVENTS:  Reports having mucus from time to time.    Vital Signs Last 24 Hrs  T(C): 36.7 (11 Nov 2019 11:52), Max: 36.7 (11 Nov 2019 11:52)  T(F): 98 (11 Nov 2019 11:52), Max: 98 (11 Nov 2019 11:52)  HR: 89 (11 Nov 2019 11:52) (89 - 94)  BP: 118/83 (11 Nov 2019 11:52) (115/76 - 138/95)  BP(mean): --  RR: 18 (11 Nov 2019 11:52) (17 - 18)  SpO2: 97% (11 Nov 2019 11:52) (97% - 100%)    PHYSICAL EXAM:  GEN:         Awake, responsive and comfortable.  HEENT:    Normal.    RESP:        crackles.  CVS:          Regular rate and rhythm.   ABD:         Soft, non-tender, non-distended;     MEDICATIONS  (STANDING):  amLODIPine   Tablet 5 milliGRAM(s) Oral daily  aspirin  chewable 81 milliGRAM(s) Oral daily  bisacodyl Suppository 10 milliGRAM(s) Rectal daily  cefTRIAXone   IVPB      cefTRIAXone   IVPB 1000 milliGRAM(s) IV Intermittent every 24 hours  dextrose 5%. 1000 milliLiter(s) (50 mL/Hr) IV Continuous <Continuous>  dextrose 50% Injectable 12.5 Gram(s) IV Push once  dextrose 50% Injectable 25 Gram(s) IV Push once  dextrose 50% Injectable 25 Gram(s) IV Push once  enoxaparin Injectable 30 milliGRAM(s) SubCutaneous daily  finasteride 5 milliGRAM(s) Oral daily  influenza   Vaccine 0.5 milliLiter(s) IntraMuscular once  insulin lispro (HumaLOG) corrective regimen sliding scale   SubCutaneous three times a day before meals  lactulose Syrup 10 Gram(s) Oral once  propranolol 20 milliGRAM(s) Oral daily  saline laxative (FLEET) Rectal Enema 1 Enema Rectal once  senna 2 Tablet(s) Oral at bedtime  tiotropium 18 MICROgram(s) Capsule 1 Capsule(s) Inhalation daily    MEDICATIONS  (PRN):  acetaminophen   Tablet .. 650 milliGRAM(s) Oral every 6 hours PRN Temp greater or equal to 38C (100.4F), Moderate Pain (4 - 6)  dextrose 40% Gel 15 Gram(s) Oral once PRN Blood Glucose LESS THAN 70 milliGRAM(s)/deciliter  glucagon  Injectable 1 milliGRAM(s) IntraMuscular once PRN Glucose LESS THAN 70 milligrams/deciliter  guaiFENesin    Syrup 100 milliGRAM(s) Oral every 6 hours PRN Cough  traMADol 50 milliGRAM(s) Oral every 8 hours PRN Severe Pain (7 - 10)    LABS:    11-11    137  |  106  |  28<H>  ----------------------------<  104<H>  4.4   |  26  |  1.35<H>    Ca    9.3      11 Nov 2019 08:08  Phos  2.9     11-11  Mg     2.1     11-11 11-06 @ 16:45  pH: 7.43  pCO2: 30  pO2: 97  SaO2: 98    ASSESSMENT AND PLAN:  ·	SOB.  ·	Suspect COPD.  ·	Tobacco abuse  ·	UTI with gram negative rods.  ·	Renal Insuffiencey.  ·	Chronic A Fib.  ·	HTN.  ·	DM     Add Symbicort and nebulizer.

## 2019-11-11 NOTE — PROGRESS NOTE ADULT - SUBJECTIVE AND OBJECTIVE BOX
Patient is a 89y old  Male who presents with a chief complaint of Shortness of breath. (08 Nov 2019 17:18)      INTERVAL HPI/OVERNIGHT EVENTS:  Pt was seen and examined, no acute events.  Patient reports abdominal discomfort and constipation    T(C): 36.7 (11-11-19 @ 11:52), Max: 36.7 (11-11-19 @ 11:52)  HR: 89 (11-11-19 @ 11:52) (89 - 89)  BP: 118/83 (11-11-19 @ 11:52) (118/83 - 121/90)  RR: 18 (11-11-19 @ 11:52) (18 - 18)  SpO2: 97% (11-11-19 @ 11:52) (97% - 100%)    MEDICATIONS  (STANDING):  amLODIPine   Tablet 5 milliGRAM(s) Oral daily  aspirin  chewable 81 milliGRAM(s) Oral daily  bisacodyl Suppository 10 milliGRAM(s) Rectal daily  cefTRIAXone   IVPB      cefTRIAXone   IVPB 1000 milliGRAM(s) IV Intermittent every 24 hours  dextrose 5%. 1000 milliLiter(s) (50 mL/Hr) IV Continuous <Continuous>  dextrose 50% Injectable 12.5 Gram(s) IV Push once  dextrose 50% Injectable 25 Gram(s) IV Push once  dextrose 50% Injectable 25 Gram(s) IV Push once  enoxaparin Injectable 30 milliGRAM(s) SubCutaneous daily  finasteride 5 milliGRAM(s) Oral daily  influenza   Vaccine 0.5 milliLiter(s) IntraMuscular once  insulin lispro (HumaLOG) corrective regimen sliding scale   SubCutaneous three times a day before meals  propranolol 20 milliGRAM(s) Oral daily  senna 2 Tablet(s) Oral at bedtime  tiotropium 18 MICROgram(s) Capsule 1 Capsule(s) Inhalation daily    MEDICATIONS  (PRN):  acetaminophen   Tablet .. 650 milliGRAM(s) Oral every 6 hours PRN Temp greater or equal to 38C (100.4F), Moderate Pain (4 - 6)  dextrose 40% Gel 15 Gram(s) Oral once PRN Blood Glucose LESS THAN 70 milliGRAM(s)/deciliter  glucagon  Injectable 1 milliGRAM(s) IntraMuscular once PRN Glucose LESS THAN 70 milligrams/deciliter  guaiFENesin    Syrup 100 milliGRAM(s) Oral every 6 hours PRN Cough      PHYSICAL EXAM:  GENERAL: NAD  HEAD:  Atraumatic  EYES: EOMI   NERVOUS SYSTEM:  Awake, alert  CHEST/LUNG: Clear  HEART: RRR  ABDOMEN: Soft, non tender  EXTREMITIES:  no edema                                     137|106|28<104  4.4|26|1.35  9.3,2.1,2.9  11-11 @ 08:08      CAPILLARY BLOOD GLUCOSE      POCT Blood Glucose.: 139 mg/dL (11 Nov 2019 15:58)  POCT Blood Glucose.: 109 mg/dL (11 Nov 2019 07:32)  POCT Blood Glucose.: 102 mg/dL (10 Nov 2019 17:01)    Culture - Blood (collected 06 Nov 2019 23:25)  Source: .Blood Blood-Peripheral  Preliminary Report (08 Nov 2019 01:02):    No growth to date.    Culture - Blood (collected 06 Nov 2019 23:25)  Source: .Blood Blood-Peripheral  Preliminary Report (08 Nov 2019 01:02):    No growth to date.    Culture - Urine (collected 06 Nov 2019 23:10)  Source: .Urine Catheterized  Final Report (08 Nov 2019 18:47):    >100,000 CFU/ml Providencia stuartii  Organism: Providencia stuartii (08 Nov 2019 18:47)  Organism: Providencia stuartii (08 Nov 2019 18:47)      RADIOLOGY & ADDITIONAL TESTS:    Imaging Personally Reviewed:  [ ] YES  [ ] NO    Consultant(s) Notes Reviewed:  [ ] YES  [ ] NO    Care Discussed with Consultants/Other Providers [ ] YES  [ ] NO

## 2019-11-11 NOTE — CHART NOTE - NSCHARTNOTEFT_GEN_A_CORE
Medicine PA Note    Notified by Rn that patient had 8 beats of v tach  and return to a fib on monitor, asymptomatic . Patient was sleeping. Vital signs stable.  will check electrolytes in am     [facility]

## 2019-11-12 LAB
CULTURE RESULTS: SIGNIFICANT CHANGE UP
CULTURE RESULTS: SIGNIFICANT CHANGE UP
SPECIMEN SOURCE: SIGNIFICANT CHANGE UP
SPECIMEN SOURCE: SIGNIFICANT CHANGE UP

## 2019-11-12 PROCEDURE — 74176 CT ABD & PELVIS W/O CONTRAST: CPT | Mod: 26

## 2019-11-12 PROCEDURE — 99232 SBSQ HOSP IP/OBS MODERATE 35: CPT

## 2019-11-12 RX ADMIN — Medication 10 MILLIGRAM(S): at 11:16

## 2019-11-12 RX ADMIN — AMLODIPINE BESYLATE 5 MILLIGRAM(S): 2.5 TABLET ORAL at 05:33

## 2019-11-12 RX ADMIN — Medication 3 MILLILITER(S): at 17:14

## 2019-11-12 RX ADMIN — ENOXAPARIN SODIUM 30 MILLIGRAM(S): 100 INJECTION SUBCUTANEOUS at 11:16

## 2019-11-12 RX ADMIN — BUDESONIDE AND FORMOTEROL FUMARATE DIHYDRATE 2 PUFF(S): 160; 4.5 AEROSOL RESPIRATORY (INHALATION) at 05:33

## 2019-11-12 RX ADMIN — SENNA PLUS 2 TABLET(S): 8.6 TABLET ORAL at 21:39

## 2019-11-12 RX ADMIN — BUDESONIDE AND FORMOTEROL FUMARATE DIHYDRATE 2 PUFF(S): 160; 4.5 AEROSOL RESPIRATORY (INHALATION) at 17:06

## 2019-11-12 RX ADMIN — SODIUM CHLORIDE 50 MILLILITER(S): 9 INJECTION INTRAMUSCULAR; INTRAVENOUS; SUBCUTANEOUS at 21:39

## 2019-11-12 RX ADMIN — FINASTERIDE 5 MILLIGRAM(S): 5 TABLET, FILM COATED ORAL at 11:19

## 2019-11-12 RX ADMIN — Medication 100 MILLIGRAM(S): at 11:19

## 2019-11-12 RX ADMIN — Medication 3 MILLILITER(S): at 23:45

## 2019-11-12 RX ADMIN — TIOTROPIUM BROMIDE 1 CAPSULE(S): 18 CAPSULE ORAL; RESPIRATORY (INHALATION) at 11:17

## 2019-11-12 RX ADMIN — Medication 2: at 11:24

## 2019-11-12 RX ADMIN — Medication 81 MILLIGRAM(S): at 11:19

## 2019-11-12 RX ADMIN — Medication 20 MILLIGRAM(S): at 05:33

## 2019-11-12 RX ADMIN — Medication 3 MILLILITER(S): at 05:37

## 2019-11-12 RX ADMIN — CEFTRIAXONE 100 MILLIGRAM(S): 500 INJECTION, POWDER, FOR SOLUTION INTRAMUSCULAR; INTRAVENOUS at 21:39

## 2019-11-12 RX ADMIN — Medication 3 MILLILITER(S): at 11:05

## 2019-11-12 NOTE — PROGRESS NOTE ADULT - SUBJECTIVE AND OBJECTIVE BOX
Patient is a 89y old  Male who presents with a chief complaint of Shortness of breath. (08 Nov 2019 17:18)      INTERVAL HPI/OVERNIGHT EVENTS:  Pt was seen and examined, no acute events.  Patient reports abdominal discomfort and constipation    T(C): 36.2 (11-12-19 @ 23:24), Max: 36.8 (11-12-19 @ 12:18)  HR: 89 (11-12-19 @ 23:24) (80 - 94)  BP: 122/78 (11-12-19 @ 23:24) (116/87 - 122/78)  RR: 18 (11-12-19 @ 23:24) (17 - 20)  SpO2: 96% (11-12-19 @ 23:24) (96% - 100%)      MEDICATIONS  (STANDING):  albuterol/ipratropium for Nebulization 3 milliLiter(s) Nebulizer every 6 hours  amLODIPine   Tablet 5 milliGRAM(s) Oral daily  aspirin  chewable 81 milliGRAM(s) Oral daily  budesonide 160 MICROgram(s)/formoterol 4.5 MICROgram(s) Inhaler 2 Puff(s) Inhalation two times a day  dextrose 5%. 1000 milliLiter(s) (50 mL/Hr) IV Continuous <Continuous>  dextrose 50% Injectable 12.5 Gram(s) IV Push once  dextrose 50% Injectable 25 Gram(s) IV Push once  dextrose 50% Injectable 25 Gram(s) IV Push once  enoxaparin Injectable 30 milliGRAM(s) SubCutaneous daily  finasteride 5 milliGRAM(s) Oral daily  influenza   Vaccine 0.5 milliLiter(s) IntraMuscular once  insulin lispro (HumaLOG) corrective regimen sliding scale   SubCutaneous three times a day before meals  propranolol 20 milliGRAM(s) Oral daily  senna 2 Tablet(s) Oral at bedtime  sodium chloride 0.9%. 1000 milliLiter(s) (50 mL/Hr) IV Continuous <Continuous>  tiotropium 18 MICROgram(s) Capsule 1 Capsule(s) Inhalation daily    MEDICATIONS  (PRN):  acetaminophen   Tablet .. 650 milliGRAM(s) Oral every 6 hours PRN Temp greater or equal to 38C (100.4F), Moderate Pain (4 - 6)  bisacodyl Suppository 10 milliGRAM(s) Rectal daily PRN Constipation  dextrose 40% Gel 15 Gram(s) Oral once PRN Blood Glucose LESS THAN 70 milliGRAM(s)/deciliter  glucagon  Injectable 1 milliGRAM(s) IntraMuscular once PRN Glucose LESS THAN 70 milligrams/deciliter  guaiFENesin    Syrup 100 milliGRAM(s) Oral every 6 hours PRN Cough  traMADol 50 milliGRAM(s) Oral every 8 hours PRN Severe Pain (7 - 10)    PHYSICAL EXAM:  GENERAL: NAD  HEAD:  Atraumatic  EYES: EOMI   NERVOUS SYSTEM:  Awake, alert  CHEST/LUNG: Clear  HEART: RRR  ABDOMEN: Soft, non tender  EXTREMITIES:  no edema                                     137|106|28<104  4.4|26|1.35  9.3,2.1,2.9  11-11 @ 08:08      CAPILLARY BLOOD GLUCOSE      POCT Blood Glucose.: 139 mg/dL (11 Nov 2019 15:58)  POCT Blood Glucose.: 109 mg/dL (11 Nov 2019 07:32)  POCT Blood Glucose.: 102 mg/dL (10 Nov 2019 17:01)    Culture - Blood (collected 06 Nov 2019 23:25)  Source: .Blood Blood-Peripheral  Preliminary Report (08 Nov 2019 01:02):    No growth to date.    Culture - Blood (collected 06 Nov 2019 23:25)  Source: .Blood Blood-Peripheral  Preliminary Report (08 Nov 2019 01:02):    No growth to date.    Culture - Urine (collected 06 Nov 2019 23:10)  Source: .Urine Catheterized  Final Report (08 Nov 2019 18:47):    >100,000 CFU/ml Providencia stuartii  Organism: Providencia stuartii (08 Nov 2019 18:47)  Organism: Providencia stuartii (08 Nov 2019 18:47)      RADIOLOGY & ADDITIONAL TESTS:    Imaging Personally Reviewed:  [ ] YES  [ ] NO    Consultant(s) Notes Reviewed:  [ ] YES  [ ] NO    Care Discussed with Consultants/Other Providers [ ] YES  [ ] NO

## 2019-11-12 NOTE — PROGRESS NOTE ADULT - SUBJECTIVE AND OBJECTIVE BOX
INTERVAL HPI:  89 year old male with HTN, DM2, Atrial fibrillation, Current smoker per wife,  brought  with family c/o dry cough for three weeks with sob today and occasional chills. No fever, URI or sputum production  Denies headache, dizziness, blurred vision, light sensitivity, focal/distal weakness or numbness, palpitations, chest pain, nausea, vomiting, abd pain. Pt is very poor historian.   Smoked close to 55 years, quit 25 years ago.    OVERNIGHT EVENTS:  Constipation is main issue.    Vital Signs Last 24 Hrs  T(C): 36.9 (12 Nov 2019 04:50), Max: 36.9 (12 Nov 2019 04:50)  T(F): 98.4 (12 Nov 2019 04:50), Max: 98.4 (12 Nov 2019 04:50)  HR: 97 (12 Nov 2019 04:50) (75 - 98)  BP: 129/91 (12 Nov 2019 04:50) (118/83 - 136/89)  BP(mean): --  RR: 18 (12 Nov 2019 04:50) (17 - 18)  SpO2: 98% (12 Nov 2019 04:50) (97% - 99%)    PHYSICAL EXAM:  GEN:         Awake, responsive and comfortable.  HEENT:    Normal.    RESP:      no wheezing  CVS:             Regular rate and rhythm.   ABD:         Soft, non-tender, non-distended;     MEDICATIONS  (STANDING):  albuterol/ipratropium for Nebulization 3 milliLiter(s) Nebulizer every 6 hours  amLODIPine   Tablet 5 milliGRAM(s) Oral daily  aspirin  chewable 81 milliGRAM(s) Oral daily  budesonide 160 MICROgram(s)/formoterol 4.5 MICROgram(s) Inhaler 2 Puff(s) Inhalation two times a day  cefTRIAXone   IVPB      cefTRIAXone   IVPB 1000 milliGRAM(s) IV Intermittent every 24 hours  dextrose 5%. 1000 milliLiter(s) (50 mL/Hr) IV Continuous <Continuous>  dextrose 50% Injectable 12.5 Gram(s) IV Push once  dextrose 50% Injectable 25 Gram(s) IV Push once  dextrose 50% Injectable 25 Gram(s) IV Push once  enoxaparin Injectable 30 milliGRAM(s) SubCutaneous daily  finasteride 5 milliGRAM(s) Oral daily  influenza   Vaccine 0.5 milliLiter(s) IntraMuscular once  insulin lispro (HumaLOG) corrective regimen sliding scale   SubCutaneous three times a day before meals  propranolol 20 milliGRAM(s) Oral daily  senna 2 Tablet(s) Oral at bedtime  sodium chloride 0.9%. 1000 milliLiter(s) (50 mL/Hr) IV Continuous <Continuous>  tiotropium 18 MICROgram(s) Capsule 1 Capsule(s) Inhalation daily    MEDICATIONS  (PRN):  acetaminophen   Tablet .. 650 milliGRAM(s) Oral every 6 hours PRN Temp greater or equal to 38C (100.4F), Moderate Pain (4 - 6)  dextrose 40% Gel 15 Gram(s) Oral once PRN Blood Glucose LESS THAN 70 milliGRAM(s)/deciliter  glucagon  Injectable 1 milliGRAM(s) IntraMuscular once PRN Glucose LESS THAN 70 milligrams/deciliter  guaiFENesin    Syrup 100 milliGRAM(s) Oral every 6 hours PRN Cough  traMADol 50 milliGRAM(s) Oral every 8 hours PRN Severe Pain (7 - 10)    LABS:    11-11    137  |  106  |  28<H>  ----------------------------<  104<H>  4.4   |  26  |  1.35<H>    Ca    9.3      11 Nov 2019 08:08  Phos  2.9     11-11  Mg     2.1     11-11 11-06 @ 16:45  pH: 7.43  pCO2: 30  pO2: 97  SaO2: 98    ASSESSMENT AND PLAN:  ·	SOB.  ·	Suspect COPD.  ·	Tobacco abuse  ·	UTI with gram negative rods.  ·	Renal Insuffiencey.  ·	Chronic A Fib.  ·	HTN.  ·	DM     Laxatives for constipation.  Symbicort and nebulizer.

## 2019-11-12 NOTE — PROGRESS NOTE ADULT - PROBLEM SELECTOR PLAN 1
Patient on multiple regimen for constipation, no bowel movement. Follow up CT abdomen, if no obstruction, will try SMOG enema.   Spoke to pharmacy, will administer SMOG enema today.

## 2019-11-12 NOTE — CHART NOTE - NSCHARTNOTEFT_GEN_A_CORE
Medicine PA Note    Called by RN that patient hasn't had a BM in a few days. Patient on multiple drug regimen to have BM. Patient was noted to have elevated  while trying to have BM. RRT called .  Patient seen and examined at bedside. Patient stated that he is having generalized abd pain, ABD soft , TTP lower quadrants, nondistended BS +2 . Will obtain a KUB to evaluated constipation.   Prior to KUB , patient had a very small bowel movement . Patient told Rn that he feels better . Will continue to monitor the patient. will follow up on KUB    [facility]    Addendum Medicine PA Note    Called by RN that patient hasn't had a BM in a few days. Patient on multiple drug regimen to have BM. Patient was noted to have elevated  while trying to have BM. RRT called .  Patient seen and examined at bedside. Patient stated that he is having generalized abd pain, ABD soft , TTP lower quadrants, nondistended BS +2 . Will obtain a KUB to evaluated constipation.   Prior to KUB , patient had a very small bowel movement . Patient told Rn that he feels better . Will continue to monitor the patient. will follow up on KUB    [facility]    Addendum  KUB noted large fecal material in RUQ /LLQ area . Patient resting comfortable in bed sleeping.  will continue on bowel regimen as ordered    [facility]

## 2019-11-13 PROCEDURE — 99233 SBSQ HOSP IP/OBS HIGH 50: CPT

## 2019-11-13 RX ORDER — LACTULOSE 10 G/15ML
10 SOLUTION ORAL
Refills: 0 | Status: DISCONTINUED | OUTPATIENT
Start: 2019-11-13 | End: 2019-11-14

## 2019-11-13 RX ADMIN — FINASTERIDE 5 MILLIGRAM(S): 5 TABLET, FILM COATED ORAL at 12:05

## 2019-11-13 RX ADMIN — SODIUM CHLORIDE 50 MILLILITER(S): 9 INJECTION INTRAMUSCULAR; INTRAVENOUS; SUBCUTANEOUS at 06:10

## 2019-11-13 RX ADMIN — TIOTROPIUM BROMIDE 1 CAPSULE(S): 18 CAPSULE ORAL; RESPIRATORY (INHALATION) at 12:05

## 2019-11-13 RX ADMIN — Medication 20 MILLIGRAM(S): at 06:11

## 2019-11-13 RX ADMIN — BUDESONIDE AND FORMOTEROL FUMARATE DIHYDRATE 2 PUFF(S): 160; 4.5 AEROSOL RESPIRATORY (INHALATION) at 17:13

## 2019-11-13 RX ADMIN — ENOXAPARIN SODIUM 30 MILLIGRAM(S): 100 INJECTION SUBCUTANEOUS at 12:05

## 2019-11-13 RX ADMIN — Medication 3 MILLILITER(S): at 05:45

## 2019-11-13 RX ADMIN — Medication 2: at 08:05

## 2019-11-13 RX ADMIN — SODIUM CHLORIDE 50 MILLILITER(S): 9 INJECTION INTRAMUSCULAR; INTRAVENOUS; SUBCUTANEOUS at 21:12

## 2019-11-13 RX ADMIN — BUDESONIDE AND FORMOTEROL FUMARATE DIHYDRATE 2 PUFF(S): 160; 4.5 AEROSOL RESPIRATORY (INHALATION) at 06:10

## 2019-11-13 RX ADMIN — Medication 3 MILLILITER(S): at 17:26

## 2019-11-13 RX ADMIN — Medication 81 MILLIGRAM(S): at 12:05

## 2019-11-13 RX ADMIN — LACTULOSE 10 GRAM(S): 10 SOLUTION ORAL at 21:13

## 2019-11-13 RX ADMIN — SENNA PLUS 2 TABLET(S): 8.6 TABLET ORAL at 21:13

## 2019-11-13 RX ADMIN — Medication 3 MILLILITER(S): at 23:24

## 2019-11-13 RX ADMIN — Medication 3 MILLILITER(S): at 11:15

## 2019-11-13 NOTE — PROGRESS NOTE ADULT - SUBJECTIVE AND OBJECTIVE BOX
INTERVAL HPI:   89 year old male with HTN, DM2, Atrial fibrillation, Current smoker per wife,  brought  with family c/o dry cough for three weeks with sob today and occasional chills. No fever, URI or sputum production  Denies headache, dizziness, blurred vision, light sensitivity, focal/distal weakness or numbness, palpitations, chest pain, nausea, vomiting, abd pain. Pt is very poor historian.   Smoked close to 55 years, quit 25 years ago.    OVERNIGHT EVENTS:  Comfortable in bed.    Vital Signs Last 24 Hrs  T(C): 36.3 (13 Nov 2019 11:38), Max: 37.1 (13 Nov 2019 05:15)  T(F): 97.4 (13 Nov 2019 11:38), Max: 98.8 (13 Nov 2019 05:15)  HR: 82 (13 Nov 2019 12:00) (80 - 91)  BP: 116/77 (13 Nov 2019 11:38) (116/77 - 122/78)  BP(mean): --  RR: 17 (13 Nov 2019 11:38) (16 - 18)  SpO2: 99% (13 Nov 2019 12:00) (96% - 100%)    PHYSICAL EXAM:  GEN:         Awake, responsive and comfortable.  HEENT:    Normal.    RESP:       no wheezing.  CVS:           Regular rate and rhythm.   ABD:         Soft, non-tender, non-distended;     MEDICATIONS  (STANDING):  albuterol/ipratropium for Nebulization 3 milliLiter(s) Nebulizer every 6 hours  amLODIPine   Tablet 5 milliGRAM(s) Oral daily  aspirin  chewable 81 milliGRAM(s) Oral daily  budesonide 160 MICROgram(s)/formoterol 4.5 MICROgram(s) Inhaler 2 Puff(s) Inhalation two times a day  dextrose 5%. 1000 milliLiter(s) (50 mL/Hr) IV Continuous <Continuous>  dextrose 50% Injectable 12.5 Gram(s) IV Push once  dextrose 50% Injectable 25 Gram(s) IV Push once  dextrose 50% Injectable 25 Gram(s) IV Push once  enoxaparin Injectable 30 milliGRAM(s) SubCutaneous daily  finasteride 5 milliGRAM(s) Oral daily  influenza   Vaccine 0.5 milliLiter(s) IntraMuscular once  insulin lispro (HumaLOG) corrective regimen sliding scale   SubCutaneous three times a day before meals  propranolol 20 milliGRAM(s) Oral daily  senna 2 Tablet(s) Oral at bedtime  sodium chloride 0.9%. 1000 milliLiter(s) (50 mL/Hr) IV Continuous <Continuous>  tiotropium 18 MICROgram(s) Capsule 1 Capsule(s) Inhalation daily    MEDICATIONS  (PRN):  acetaminophen   Tablet .. 650 milliGRAM(s) Oral every 6 hours PRN Temp greater or equal to 38C (100.4F), Moderate Pain (4 - 6)  bisacodyl Suppository 10 milliGRAM(s) Rectal daily PRN Constipation  dextrose 40% Gel 15 Gram(s) Oral once PRN Blood Glucose LESS THAN 70 milliGRAM(s)/deciliter  glucagon  Injectable 1 milliGRAM(s) IntraMuscular once PRN Glucose LESS THAN 70 milligrams/deciliter  guaiFENesin    Syrup 100 milliGRAM(s) Oral every 6 hours PRN Cough  traMADol 50 milliGRAM(s) Oral every 8 hours PRN Severe Pain (7 - 10)    11-06 @ 16:45  pH: 7.43  pCO2: 30  pO2: 97  SaO2: 98  < from: CT Abdomen and Pelvis No Cont (11.12.19 @ 16:32) >    EXAM:  CT ABDOMEN AND PELVIS                          PROCEDURE DATE:  11/12/2019      INTERPRETATION:  CLINICAL STATEMENT: Fecal Impaction, constipation, UTI,   renal failure    TECHNIQUE: CT of the abdomen and pelvis was performed in the axial plane   utilizing thin slices without IV or oral contrast. Sagittal and coronal   reformatting was performed. The patient could not raise the arms above   the head for the study. Artifact from left arm race limits cause   significant artifact.    COMPARISON: None.    FINDINGS:    The lower chest demonstrates no abnormality.    The evaluation of the abdominal viscera and the bowel is limited without   contrast.    The liver is grossly unremarkable. The superior liver however is not   completelyincluded on the study. The gallbladder is unremarkable    The spleen, pancreas and adrenal glands are grossly unremarkable.    There is no hydronephrosis or urinary calculus. There are bilateral renal   cysts The bladder is unremarkable    There is no bowel obstruction.  There is no intraperitoneal free air.    There is no free fluid.The appendix is seen. There is a large amount of   stool in the recto sigmoid colon and large amount of stool in the   descending colon and moderate stool throughoutthe colon suggesting   constipation    The aorta is not aneurysmal. There is atherosclerotic calcification of   the abdominal aorta and its branches There is no significant abdominal,   retroperitoneal or pelvic lymphadenopathy. Pelvic structures areintact.    The bony structures are remarkable for degenerative changes of the spine   and right hip. Left total hip replacement is noted. There is a high   density foreign body seen in the soft tissues at the level of the right   inferior pubic ramus. This is suggestive of and body. Please correlate   clinically    IMPRESSION:  Study limited by patient's inability to raise the arms above the head and   by artifact from patient's arm jewelry  Stool throughout the colon suggesting constipation  Renal cysts    FARIDA QUINTERO M.D.,ATTENDING RADIOLOGIST  This document has been electronically signed. Nov 12 2019  4:50PM      ASSESSMENT AND PLAN:  ·	SOB.  ·	Suspect COPD.  ·	Tobacco abuse  ·	UTI with gram negative rods.  ·	Renal Insuffiencey.  ·	Chronic A Fib.  ·	HTN.  ·	DM   ·	Constipation.    Continue Symbicort and nebulizer.

## 2019-11-13 NOTE — PROGRESS NOTE ADULT - ASSESSMENT
89 year old male PMH HTN, DM2, atrial fibrillation, BIPAP with family c/o dry cough for three weeks with sob today. Pt denies headache, dizziness, blurred vision, light sensitivity, focal/distal weakness or numbness, palpitations, chest pain, nausea, vomiting, abd pain. Pt is very poor historian. CTA shows no PE, emphysematous changes, no pneumonia, heart size enlarged.      COPD exacerbation:  - Improved.  - Continue current meds  - On NC      Constipation:  - Continue current regimen added lactulose  - If no bowel movement will call GI      LETICIA:  - CKD stage 3  - Cr stable      Acute cystitis with hematuria:  - Completed Abx.          Chronic atrial fibrillation:  - Continue propanolol and ASA.       HTN:  - Bp acceptable  - continue current meds      DM:  - HgA1c: 6.0%  - cont w/ FS monitoring w/ insulin s.s coverage.         DVTp:   - Lovenox       disposition: [facility]

## 2019-11-13 NOTE — PROGRESS NOTE ADULT - SUBJECTIVE AND OBJECTIVE BOX
Patient is a 89y old  Male who presents with a chief complaint of Shortness of breath. (13 Nov 2019 15:11)      INTERVAL HPI/OVERNIGHT EVENTS:  Pt was seen and examined, no acute events.    MEDICATIONS  (STANDING):  albuterol/ipratropium for Nebulization 3 milliLiter(s) Nebulizer every 6 hours  amLODIPine   Tablet 5 milliGRAM(s) Oral daily  aspirin  chewable 81 milliGRAM(s) Oral daily  budesonide 160 MICROgram(s)/formoterol 4.5 MICROgram(s) Inhaler 2 Puff(s) Inhalation two times a day  dextrose 5%. 1000 milliLiter(s) (50 mL/Hr) IV Continuous <Continuous>  dextrose 50% Injectable 12.5 Gram(s) IV Push once  dextrose 50% Injectable 25 Gram(s) IV Push once  dextrose 50% Injectable 25 Gram(s) IV Push once  enoxaparin Injectable 30 milliGRAM(s) SubCutaneous daily  finasteride 5 milliGRAM(s) Oral daily  influenza   Vaccine 0.5 milliLiter(s) IntraMuscular once  insulin lispro (HumaLOG) corrective regimen sliding scale   SubCutaneous three times a day before meals  propranolol 20 milliGRAM(s) Oral daily  senna 2 Tablet(s) Oral at bedtime  sodium chloride 0.9%. 1000 milliLiter(s) (50 mL/Hr) IV Continuous <Continuous>  tiotropium 18 MICROgram(s) Capsule 1 Capsule(s) Inhalation daily    MEDICATIONS  (PRN):  acetaminophen   Tablet .. 650 milliGRAM(s) Oral every 6 hours PRN Temp greater or equal to 38C (100.4F), Moderate Pain (4 - 6)  bisacodyl Suppository 10 milliGRAM(s) Rectal daily PRN Constipation  dextrose 40% Gel 15 Gram(s) Oral once PRN Blood Glucose LESS THAN 70 milliGRAM(s)/deciliter  glucagon  Injectable 1 milliGRAM(s) IntraMuscular once PRN Glucose LESS THAN 70 milligrams/deciliter  guaiFENesin    Syrup 100 milliGRAM(s) Oral every 6 hours PRN Cough  lactulose Syrup 10 Gram(s) Oral two times a day PRN constipation  traMADol 50 milliGRAM(s) Oral every 8 hours PRN Severe Pain (7 - 10)      Allergies  No Known Allergies      Vital Signs Last 24 Hrs  T(C): 36.6 (13 Nov 2019 16:10), Max: 37.1 (13 Nov 2019 05:15)  T(F): 97.8 (13 Nov 2019 16:10), Max: 98.8 (13 Nov 2019 05:15)  HR: 85 (13 Nov 2019 17:34) (82 - 91)  BP: 114/76 (13 Nov 2019 16:10) (114/76 - 122/78)  BP(mean): --  RR: 18 (13 Nov 2019 16:10) (16 - 18)  SpO2: 99% (13 Nov 2019 17:34) (96% - 100%)    PHYSICAL EXAM:  GENERAL: NAD  HEAD: Atraumatic  EYES: PERRLA  NERVOUS SYSTEM:  Awake, alert  CHEST/LUNG: Clear  HEART: RRR  ABDOMEN: Soft, non tender  EXTREMITIES:  no edema    LABS:      CAPILLARY BLOOD GLUCOSE      POCT Blood Glucose.: 91 mg/dL (13 Nov 2019 15:23)  POCT Blood Glucose.: 125 mg/dL (13 Nov 2019 12:03)  POCT Blood Glucose.: 180 mg/dL (13 Nov 2019 07:58)  POCT Blood Glucose.: 144 mg/dL (12 Nov 2019 20:59)      RADIOLOGY & ADDITIONAL TESTS:    Imaging Personally Reviewed:  [ ] YES  [ ] NO    Consultant(s) Notes Reviewed:  [ ] YES  [ ] NO    Care Discussed with Consultants/Other Providers [ ] YES  [ ] NO

## 2019-11-14 PROCEDURE — 99233 SBSQ HOSP IP/OBS HIGH 50: CPT

## 2019-11-14 RX ORDER — LACTULOSE 10 G/15ML
20 SOLUTION ORAL ONCE
Refills: 0 | Status: COMPLETED | OUTPATIENT
Start: 2019-11-15 | End: 2019-11-15

## 2019-11-14 RX ORDER — LACTULOSE 10 G/15ML
10 SOLUTION ORAL
Refills: 0 | Status: DISCONTINUED | OUTPATIENT
Start: 2019-11-16 | End: 2019-11-16

## 2019-11-14 RX ORDER — MULTIVIT WITH MIN/MFOLATE/K2 340-15/3 G
1 POWDER (GRAM) ORAL ONCE
Refills: 0 | Status: COMPLETED | OUTPATIENT
Start: 2019-11-15 | End: 2019-11-15

## 2019-11-14 RX ADMIN — SODIUM CHLORIDE 50 MILLILITER(S): 9 INJECTION INTRAMUSCULAR; INTRAVENOUS; SUBCUTANEOUS at 12:14

## 2019-11-14 RX ADMIN — SODIUM CHLORIDE 50 MILLILITER(S): 9 INJECTION INTRAMUSCULAR; INTRAVENOUS; SUBCUTANEOUS at 05:33

## 2019-11-14 RX ADMIN — SENNA PLUS 2 TABLET(S): 8.6 TABLET ORAL at 21:27

## 2019-11-14 RX ADMIN — ENOXAPARIN SODIUM 30 MILLIGRAM(S): 100 INJECTION SUBCUTANEOUS at 11:44

## 2019-11-14 RX ADMIN — Medication 81 MILLIGRAM(S): at 11:44

## 2019-11-14 RX ADMIN — Medication 20 MILLIGRAM(S): at 05:34

## 2019-11-14 RX ADMIN — SODIUM CHLORIDE 50 MILLILITER(S): 9 INJECTION INTRAMUSCULAR; INTRAVENOUS; SUBCUTANEOUS at 21:27

## 2019-11-14 RX ADMIN — Medication 3 MILLILITER(S): at 05:08

## 2019-11-14 RX ADMIN — Medication 3 MILLILITER(S): at 11:01

## 2019-11-14 RX ADMIN — Medication 3 MILLILITER(S): at 17:05

## 2019-11-14 RX ADMIN — FINASTERIDE 5 MILLIGRAM(S): 5 TABLET, FILM COATED ORAL at 11:44

## 2019-11-14 RX ADMIN — LACTULOSE 10 GRAM(S): 10 SOLUTION ORAL at 05:53

## 2019-11-14 RX ADMIN — Medication 10 MILLIGRAM(S): at 05:53

## 2019-11-14 RX ADMIN — BUDESONIDE AND FORMOTEROL FUMARATE DIHYDRATE 2 PUFF(S): 160; 4.5 AEROSOL RESPIRATORY (INHALATION) at 17:05

## 2019-11-14 RX ADMIN — BUDESONIDE AND FORMOTEROL FUMARATE DIHYDRATE 2 PUFF(S): 160; 4.5 AEROSOL RESPIRATORY (INHALATION) at 05:34

## 2019-11-14 RX ADMIN — TIOTROPIUM BROMIDE 1 CAPSULE(S): 18 CAPSULE ORAL; RESPIRATORY (INHALATION) at 12:13

## 2019-11-14 RX ADMIN — AMLODIPINE BESYLATE 5 MILLIGRAM(S): 2.5 TABLET ORAL at 05:34

## 2019-11-14 NOTE — CONSULT NOTE ADULT - ASSESSMENT
HPI:  89 year old male PMH HTN, DM2, atrial fibrillation,  bibems with family c/o dry cough for three weeks with sob today. Pt denies headache, dizziness, blurred vision, light sensitivity, focal/distal weakness or numbness, palpitations, chest pain, nausea, vomiting, abd pain. Pt is very poor historian. (06 Nov 2019 22:09)  ---------------- As Above ------------ Seen earlier today  Called because of severe constipation. Patient did not respond to treatment until today. Patient denies history of constipation in the past.   The patient denies melena, hematochezia, hematemesis, nausea, vomiting, abdominal pain, constipation, diarrhea, or change in bowel movements Patient denies having a colonoscopy  One BM today.    Constipation, new history - See Ct scan  - 1) TSH  2) enema x 1  3) mag citrate x 1  4) lactulose 5) will hold off on any GI procedure at present time.

## 2019-11-14 NOTE — CHART NOTE - NSCHARTNOTEFT_GEN_A_CORE
Factors impacting intake: [x ] none [ ] nausea  [ ] vomiting [ ] diarrhea [ ] constipation  [ ]chewing problems [ ] swallowing issues  [ ] other:     11/8 - Diet Prescription: Diet, Regular:   Low Sodium  Supplement Feeding Modality:  Oral  Glucerna Shake Cans or Servings Per Day:  1       Frequency:  Two Times a day (11-08-19 @ 15:59)    Intake: 75 -100% meals & Glucerna Shake 8 twice per day (440 mark, 20 gm pro).     Current Weight: 11/14 - 154.1 (69.6 kg) Edema - 1+ generalized ; 11/8 - 154.7 (70.2 kg) no edema noted  % Weight Change - .3 % / .6 kg loss x 6 days w/ noted 1 + generalized edema    Nutrition focused physical exam conducted ;   Subcutaneous fat loss: [mild ] Orbital fat pads region, [unable ]Buccal fat region, [unable ]Triceps region,  [wdl ]Ribs region.  Muscle wasting: [ moderate]Temples region, [moderate ]Clavicle region, [moderate ]Shoulder region, [unable ]Scapula region, [mild ]Interosseous region,  [unable ]thigh region, [mild ]Calf region      Pertinent Medications: MEDICATIONS  (STANDING):  albuterol/ipratropium for Nebulization 3 milliLiter(s) Nebulizer every 6 hours  amLODIPine   Tablet 5 milliGRAM(s) Oral daily  aspirin  chewable 81 milliGRAM(s) Oral daily  budesonide 160 MICROgram(s)/formoterol 4.5 MICROgram(s) Inhaler 2 Puff(s) Inhalation two times a day  dextrose 5%. 1000 milliLiter(s) (50 mL/Hr) IV Continuous <Continuous>  dextrose 50% Injectable 12.5 Gram(s) IV Push once  dextrose 50% Injectable 25 Gram(s) IV Push once  dextrose 50% Injectable 25 Gram(s) IV Push once  enoxaparin Injectable 30 milliGRAM(s) SubCutaneous daily  finasteride 5 milliGRAM(s) Oral daily  influenza   Vaccine 0.5 milliLiter(s) IntraMuscular once  insulin lispro (HumaLOG) corrective regimen sliding scale   SubCutaneous three times a day before meals  propranolol 20 milliGRAM(s) Oral daily  senna 2 Tablet(s) Oral at bedtime  sodium chloride 0.9%. 1000 milliLiter(s) (50 mL/Hr) IV Continuous <Continuous>  tiotropium 18 MICROgram(s) Capsule 1 Capsule(s) Inhalation daily    MEDICATIONS  (PRN):  acetaminophen   Tablet .. 650 milliGRAM(s) Oral every 6 hours PRN Temp greater or equal to 38C (100.4F), Moderate Pain (4 - 6)  bisacodyl Suppository 10 milliGRAM(s) Rectal daily PRN Constipation  dextrose 40% Gel 15 Gram(s) Oral once PRN Blood Glucose LESS THAN 70 milliGRAM(s)/deciliter  glucagon  Injectable 1 milliGRAM(s) IntraMuscular once PRN Glucose LESS THAN 70 milligrams/deciliter  guaiFENesin    Syrup 100 milliGRAM(s) Oral every 6 hours PRN Cough  lactulose Syrup 10 Gram(s) Oral two times a day PRN constipation  traMADol 50 milliGRAM(s) Oral every 8 hours PRN Severe Pain (7 - 10)    Pertinent Labs:  11-11 Phos 2.9 mg/dL 11-07 [id number] 6.0 %<H>     CAPILLARY BLOOD GLUCOSE      POCT Blood Glucose.: 128 mg/dL (14 Nov 2019 11:14)  POCT Blood Glucose.: 116 mg/dL (14 Nov 2019 07:34)  POCT Blood Glucose.: 126 mg/dL (13 Nov 2019 20:51)    Skin: sacrum stage 2         L heel stage 1    Estimated Needs:   [x ] no change since previous assessment ( 11/8/19 )  [ ] recalculated:     Nutrition Diagnosis:    Nutrition Diagnosis:  Nutrition diagnosis yes...     Nutrition Diagnostic Terminology #1 Malnutrition...     Malnutrition Moderate malnutrition in the context of chronic illness.     Etiology Inadequate energy/protein intake related to renal failure and pressure ulcers.    Signs/Symptoms Physical: moderate muscle wasting; 7% wt loss x 5 months.     Goal/Expected Outcome Pt to consume >75% meals/supplements.-> met      Nutrition Diagnosis is [ ] ongoing  [x ] resolving [ ] not applicable     New Nutrition Diagnosis: [x ] not applicable       Interventions:   Recommend  [ x ] Continue current diet as Rx'd  [ ] Change Diet To:  [ ] Nutrition Supplement  [ ] Nutrition Support  [ ] Other:     Monitoring and Evaluation:   [x] PO intake [ x ] Tolerance to diet prescription [ x ] weights [ x ] labs[ x ] follow up per protocol  [ ] other:

## 2019-11-14 NOTE — PROGRESS NOTE ADULT - SUBJECTIVE AND OBJECTIVE BOX
Patient is a 89y old  Male who presents with a chief complaint of Shortness of breath. (14 Nov 2019 11:16)      INTERVAL HPI/OVERNIGHT EVENTS:  Pt was seen and examined, no acute events.    MEDICATIONS  (STANDING):  albuterol/ipratropium for Nebulization 3 milliLiter(s) Nebulizer every 6 hours  amLODIPine   Tablet 5 milliGRAM(s) Oral daily  aspirin  chewable 81 milliGRAM(s) Oral daily  budesonide 160 MICROgram(s)/formoterol 4.5 MICROgram(s) Inhaler 2 Puff(s) Inhalation two times a day  dextrose 5%. 1000 milliLiter(s) (50 mL/Hr) IV Continuous <Continuous>  dextrose 50% Injectable 12.5 Gram(s) IV Push once  dextrose 50% Injectable 25 Gram(s) IV Push once  dextrose 50% Injectable 25 Gram(s) IV Push once  enoxaparin Injectable 30 milliGRAM(s) SubCutaneous daily  finasteride 5 milliGRAM(s) Oral daily  influenza   Vaccine 0.5 milliLiter(s) IntraMuscular once  insulin lispro (HumaLOG) corrective regimen sliding scale   SubCutaneous three times a day before meals  propranolol 20 milliGRAM(s) Oral daily  senna 2 Tablet(s) Oral at bedtime  sodium chloride 0.9%. 1000 milliLiter(s) (50 mL/Hr) IV Continuous <Continuous>  tiotropium 18 MICROgram(s) Capsule 1 Capsule(s) Inhalation daily    MEDICATIONS  (PRN):  acetaminophen   Tablet .. 650 milliGRAM(s) Oral every 6 hours PRN Temp greater or equal to 38C (100.4F), Moderate Pain (4 - 6)  bisacodyl Suppository 10 milliGRAM(s) Rectal daily PRN Constipation  dextrose 40% Gel 15 Gram(s) Oral once PRN Blood Glucose LESS THAN 70 milliGRAM(s)/deciliter  glucagon  Injectable 1 milliGRAM(s) IntraMuscular once PRN Glucose LESS THAN 70 milligrams/deciliter  guaiFENesin    Syrup 100 milliGRAM(s) Oral every 6 hours PRN Cough  lactulose Syrup 10 Gram(s) Oral two times a day PRN constipation  traMADol 50 milliGRAM(s) Oral every 8 hours PRN Severe Pain (7 - 10)      Allergies  No Known Allergies        Vital Signs Last 24 Hrs  T(C): 36.8 (14 Nov 2019 16:35), Max: 36.8 (13 Nov 2019 23:27)  T(F): 98.2 (14 Nov 2019 16:35), Max: 98.3 (13 Nov 2019 23:27)  HR: 75 (14 Nov 2019 17:34) (70 - 106)  BP: 104/69 (14 Nov 2019 16:35) (98/68 - 127/82)  BP(mean): --  RR: 18 (14 Nov 2019 16:35) (16 - 19)  SpO2: 97% (14 Nov 2019 17:34) (96% - 100%)    PHYSICAL EXAM:  GENERAL: NAD  HEAD:  Atraumatic  EYES: PERRLA  NERVOUS SYSTEM:  Awake, alert  CHEST/LUNG: Clear  HEART: RRR  ABDOMEN: Soft, non tender  EXTREMITIES:  no edema      LABS:              CAPILLARY BLOOD GLUCOSE      POCT Blood Glucose.: 91 mg/dL (14 Nov 2019 16:15)  POCT Blood Glucose.: 128 mg/dL (14 Nov 2019 11:14)  POCT Blood Glucose.: 116 mg/dL (14 Nov 2019 07:34)  POCT Blood Glucose.: 126 mg/dL (13 Nov 2019 20:51)      RADIOLOGY & ADDITIONAL TESTS:    Imaging Personally Reviewed:  [ ] YES  [ ] NO    Consultant(s) Notes Reviewed:  [ ] YES  [ ] NO    Care Discussed with Consultants/Other Providers [ ] YES  [ ] NO

## 2019-11-14 NOTE — PROGRESS NOTE ADULT - SUBJECTIVE AND OBJECTIVE BOX
INTERVAL HPI:   89 year old male with HTN, DM2, Atrial fibrillation, Current smoker per wife,  brought  with family c/o dry cough for three weeks with sob today and occasional chills. No fever, URI or sputum production  Denies headache, dizziness, blurred vision, light sensitivity, focal/distal weakness or numbness, palpitations, chest pain, nausea, vomiting, abd pain. Pt is very poor historian.   Smoked close to 55 years, quit 25 years ago.    OVERNIGHT EVENTS:  Constipation remains an issue.    Vital Signs Last 24 Hrs  T(C): 36.8 (14 Nov 2019 04:46), Max: 36.8 (13 Nov 2019 23:27)  T(F): 98.2 (14 Nov 2019 04:46), Max: 98.3 (13 Nov 2019 23:27)  HR: 78 (14 Nov 2019 05:08) (78 - 100)  BP: 122/78 (14 Nov 2019 04:46) (114/76 - 127/82)  BP(mean): --  RR: 18 (14 Nov 2019 04:46) (16 - 18)  SpO2: 100% (14 Nov 2019 05:08) (98% - 100%)    PHYSICAL EXAM:  GEN:         Awake, responsive and comfortable.  HEENT:    Normal.    RESP:       no wheezing.  CVS:             Regular rate and rhythm.   ABD:         Soft, non-tender, non-distended;     MEDICATIONS  (STANDING):  albuterol/ipratropium for Nebulization 3 milliLiter(s) Nebulizer every 6 hours  amLODIPine   Tablet 5 milliGRAM(s) Oral daily  aspirin  chewable 81 milliGRAM(s) Oral daily  budesonide 160 MICROgram(s)/formoterol 4.5 MICROgram(s) Inhaler 2 Puff(s) Inhalation two times a day  dextrose 5%. 1000 milliLiter(s) (50 mL/Hr) IV Continuous <Continuous>  dextrose 50% Injectable 12.5 Gram(s) IV Push once  dextrose 50% Injectable 25 Gram(s) IV Push once  dextrose 50% Injectable 25 Gram(s) IV Push once  enoxaparin Injectable 30 milliGRAM(s) SubCutaneous daily  finasteride 5 milliGRAM(s) Oral daily  influenza   Vaccine 0.5 milliLiter(s) IntraMuscular once  insulin lispro (HumaLOG) corrective regimen sliding scale   SubCutaneous three times a day before meals  propranolol 20 milliGRAM(s) Oral daily  senna 2 Tablet(s) Oral at bedtime  sodium chloride 0.9%. 1000 milliLiter(s) (50 mL/Hr) IV Continuous <Continuous>  tiotropium 18 MICROgram(s) Capsule 1 Capsule(s) Inhalation daily    MEDICATIONS  (PRN):  acetaminophen   Tablet .. 650 milliGRAM(s) Oral every 6 hours PRN Temp greater or equal to 38C (100.4F), Moderate Pain (4 - 6)  bisacodyl Suppository 10 milliGRAM(s) Rectal daily PRN Constipation  dextrose 40% Gel 15 Gram(s) Oral once PRN Blood Glucose LESS THAN 70 milliGRAM(s)/deciliter  glucagon  Injectable 1 milliGRAM(s) IntraMuscular once PRN Glucose LESS THAN 70 milligrams/deciliter  guaiFENesin    Syrup 100 milliGRAM(s) Oral every 6 hours PRN Cough  lactulose Syrup 10 Gram(s) Oral two times a day PRN constipation  traMADol 50 milliGRAM(s) Oral every 8 hours PRN Severe Pain (7 - 10)    ASSESSMENT AND PLAN:  ·	SOB.  ·	Suspect COPD.  ·	Tobacco abuse  ·	UTI with gram negative rods.  ·	Renal Insuffiencey.  ·	Chronic A Fib.  ·	HTN.  ·	DM   ·	Constipation.    Pulmonary close to base line.  Continue Symbicort and nebulizer.

## 2019-11-14 NOTE — PROGRESS NOTE ADULT - ASSESSMENT
89 year old male PMH HTN, DM2, atrial fibrillation, BIPAP with family c/o dry cough for three weeks with sob today. Pt denies headache, dizziness, blurred vision, light sensitivity, focal/distal weakness or numbness, palpitations, chest pain, nausea, vomiting, abd pain. Pt is very poor historian. CTA shows no PE, emphysematous changes, no pneumonia, heart size enlarged.      COPD exacerbation:  - Improved.  - Continue current meds  - On NC      Constipation:  - Continue current regimen added lactulose  - GI consult called.  - Had bowel movement this am       LETICIA:  - CKD stage 3  - Cr stable      Acute cystitis with hematuria:  - Completed Abx.          Chronic atrial fibrillation:  - Continue propanolol and ASA.       HTN:  - Bp acceptable  - continue current meds      DM:  - HgA1c: 6.0%  - cont w/ FS monitoring w/ insulin s.s coverage.         DVTp:   - Lovenox       Disposition: MILVIA

## 2019-11-14 NOTE — CONSULT NOTE ADULT - SUBJECTIVE AND OBJECTIVE BOX
HPI:  89 year old male PMH HTN, DM2, atrial fibrillation,  bibems with family c/o dry cough for three weeks with sob today. Pt denies headache, dizziness, blurred vision, light sensitivity, focal/distal weakness or numbness, palpitations, chest pain, nausea, vomiting, abd pain. Pt is very poor historian. (06 Nov 2019 22:09)  ---------------- As Above ------------ Seen earlier today  Called because of severe constipation. Patient did not respond to treatment until today. Patient denies history of constipation in the past.   The patient denies melena, hematochezia, hematemesis, nausea, vomiting, abdominal pain, constipation, diarrhea, or change in bowel movements Patient denies having a colonoscopy  One BM today.       PAST MEDICAL & SURGICAL HISTORY:  Dislocation of hip, left, closed: - repeated 3 times, last on 01/21/16  Arrhythmia, long term  Diabetes  Hypertension  Hx of shoulder surgery  History of total hip replacement      MEDICATIONS  (STANDING):  albuterol/ipratropium for Nebulization 3 milliLiter(s) Nebulizer every 6 hours  amLODIPine   Tablet 5 milliGRAM(s) Oral daily  aspirin  chewable 81 milliGRAM(s) Oral daily  budesonide 160 MICROgram(s)/formoterol 4.5 MICROgram(s) Inhaler 2 Puff(s) Inhalation two times a day  dextrose 5%. 1000 milliLiter(s) (50 mL/Hr) IV Continuous <Continuous>  dextrose 50% Injectable 12.5 Gram(s) IV Push once  dextrose 50% Injectable 25 Gram(s) IV Push once  dextrose 50% Injectable 25 Gram(s) IV Push once  enoxaparin Injectable 30 milliGRAM(s) SubCutaneous daily  finasteride 5 milliGRAM(s) Oral daily  influenza   Vaccine 0.5 milliLiter(s) IntraMuscular once  insulin lispro (HumaLOG) corrective regimen sliding scale   SubCutaneous three times a day before meals  propranolol 20 milliGRAM(s) Oral daily  senna 2 Tablet(s) Oral at bedtime  sodium chloride 0.9%. 1000 milliLiter(s) (50 mL/Hr) IV Continuous <Continuous>  tiotropium 18 MICROgram(s) Capsule 1 Capsule(s) Inhalation daily    MEDICATIONS  (PRN):  acetaminophen   Tablet .. 650 milliGRAM(s) Oral every 6 hours PRN Temp greater or equal to 38C (100.4F), Moderate Pain (4 - 6)  bisacodyl Suppository 10 milliGRAM(s) Rectal daily PRN Constipation  dextrose 40% Gel 15 Gram(s) Oral once PRN Blood Glucose LESS THAN 70 milliGRAM(s)/deciliter  glucagon  Injectable 1 milliGRAM(s) IntraMuscular once PRN Glucose LESS THAN 70 milligrams/deciliter  guaiFENesin    Syrup 100 milliGRAM(s) Oral every 6 hours PRN Cough  traMADol 50 milliGRAM(s) Oral every 8 hours PRN Severe Pain (7 - 10)      Allergies    No Known Allergies    Intolerances        FAMILY HISTORY:  Family history of hypertension (Sibling, Sibling)  Family history of diabetes mellitus (Sibling, Sibling)      REVIEW OF SYSTEMS:    CONSTITUTIONAL: No fever, weight loss, or fatigue  EYES: No eye pain, visual disturbances, or discharge  ENMT:  No difficulty hearing, tinnitus, vertigo; No sinus or throat pain  NECK: No pain or stiffness  BREASTS: No pain, masses, or nipple discharge  RESPIRATORY: No cough, wheezing, chills or hemoptysis; No shortness of breath  CARDIOVASCULAR: No chest pain, palpitations, dizziness, or leg swelling  GASTROINTESTINAL:  see above  GENITOURINARY: No dysuria, frequency, hematuria, or incontinence  NEUROLOGICAL: No headaches, memory loss, loss of strength, numbness, or tremors  SKIN: No itching, burning, rashes, or lesions   LYMPH NODES: No enlarged glands  ENDOCRINE: No heat or cold intolerance; No hair loss  MUSCULOSKELETAL: No joint pain or swelling; No muscle, back, or extremity pain  PSYCHIATRIC: No depression, anxiety, mood swings, or difficulty sleeping  HEME/LYMPH: No easy bruising, or bleeding gums  ALLERGY AND IMMUNOLOGIC: No hives or eczema          SOCIAL HISTORY:    FAMILY HISTORY:  Family history of hypertension (Sibling, Sibling)  Family history of diabetes mellitus (Sibling, Sibling)      Vital Signs Last 24 Hrs  T(C): 36.8 (14 Nov 2019 16:35), Max: 36.8 (13 Nov 2019 23:27)  T(F): 98.2 (14 Nov 2019 16:35), Max: 98.3 (13 Nov 2019 23:27)  HR: 75 (14 Nov 2019 17:34) (70 - 106)  BP: 104/69 (14 Nov 2019 16:35) (98/68 - 127/82)  BP(mean): --  RR: 18 (14 Nov 2019 16:35) (16 - 19)  SpO2: 97% (14 Nov 2019 17:34) (96% - 100%)    PHYSICAL EXAM:    GENERAL: NAD, well-groomed, well-developed  HEAD:  Atraumatic, Normocephalic  EYES: EOMI, PERRLA, conjunctiva and sclera clear  NECK: Supple, No JVD, Normal thyroid  NERVOUS SYSTEM:  Alert & Oriented X3, Good concentration;   CHEST/LUNG: Clear to percussion bilaterally; No rales, rhonchi, wheezing, or rubs  HEART: Regular rate and rhythm; No murmurs, rubs, or gallops  ABDOMEN: Soft, Nontender, Nondistended; Bowel sounds present  EXTREMITIES:  2+ Peripheral Pulses, No clubbing, cyanosis, or edema  LYMPH: No lymphadenopathy noted   RECTAL: Small amount of soft stool   SKIN: No rashes or lesions    LABS:      CBC:  11-09 @ 07:57  WBC  9.24  HGB 14.0  HCT 43.9 Plate 191  MCV 89.6  11-08 @ 07:07  WBC  8.87  HGB 13.9  HCT 44.5 Plate 176  MCV 90.4           11 Nov 2019 08:08    137    |  106    |  28     ----------------------------<  104    4.4     |  26     |  1.35   09 Nov 2019 07:57    139    |  108    |  21     ----------------------------<  100    4.4     |  26     |  1.28   08 Nov 2019 07:07    140    |  109    |  23     ----------------------------<  101    4.1     |  25     |  1.35     Ca    9.3        11 Nov 2019 08:08  Ca    9.5        09 Nov 2019 07:57  Ca    9.2        08 Nov 2019 07:07  Phos  2.9       11 Nov 2019 08:08  Mg     2.1       11 Nov 2019 08:08              RADIOLOGY & ADDITIONAL STUDIES:

## 2019-11-15 ENCOUNTER — TRANSCRIPTION ENCOUNTER (OUTPATIENT)
Age: 84
End: 2019-11-15

## 2019-11-15 LAB
ANION GAP SERPL CALC-SCNC: 4 MMOL/L — LOW (ref 5–17)
BUN SERPL-MCNC: 22 MG/DL — SIGNIFICANT CHANGE UP (ref 7–23)
CALCIUM SERPL-MCNC: 9.2 MG/DL — SIGNIFICANT CHANGE UP (ref 8.5–10.1)
CHLORIDE SERPL-SCNC: 104 MMOL/L — SIGNIFICANT CHANGE UP (ref 96–108)
CO2 SERPL-SCNC: 31 MMOL/L — SIGNIFICANT CHANGE UP (ref 22–31)
CREAT SERPL-MCNC: 1.36 MG/DL — HIGH (ref 0.5–1.3)
GLUCOSE SERPL-MCNC: 103 MG/DL — HIGH (ref 70–99)
HCT VFR BLD CALC: 39.9 % — SIGNIFICANT CHANGE UP (ref 39–50)
HGB BLD-MCNC: 12.4 G/DL — LOW (ref 13–17)
MCHC RBC-ENTMCNC: 28.8 PG — SIGNIFICANT CHANGE UP (ref 27–34)
MCHC RBC-ENTMCNC: 31.1 GM/DL — LOW (ref 32–36)
MCV RBC AUTO: 92.8 FL — SIGNIFICANT CHANGE UP (ref 80–100)
NRBC # BLD: 0 /100 WBCS — SIGNIFICANT CHANGE UP (ref 0–0)
PLATELET # BLD AUTO: 178 K/UL — SIGNIFICANT CHANGE UP (ref 150–400)
POTASSIUM SERPL-MCNC: 4.8 MMOL/L — SIGNIFICANT CHANGE UP (ref 3.5–5.3)
POTASSIUM SERPL-SCNC: 4.8 MMOL/L — SIGNIFICANT CHANGE UP (ref 3.5–5.3)
RBC # BLD: 4.3 M/UL — SIGNIFICANT CHANGE UP (ref 4.2–5.8)
RBC # FLD: 13.8 % — SIGNIFICANT CHANGE UP (ref 10.3–14.5)
SODIUM SERPL-SCNC: 139 MMOL/L — SIGNIFICANT CHANGE UP (ref 135–145)
T4 FREE SERPL-MCNC: 1.2 NG/DL — SIGNIFICANT CHANGE UP (ref 0.9–1.8)
TSH SERPL-MCNC: 4.1 UU/ML — HIGH (ref 0.36–3.74)
WBC # BLD: 8.69 K/UL — SIGNIFICANT CHANGE UP (ref 3.8–10.5)
WBC # FLD AUTO: 8.69 K/UL — SIGNIFICANT CHANGE UP (ref 3.8–10.5)

## 2019-11-15 PROCEDURE — 99233 SBSQ HOSP IP/OBS HIGH 50: CPT

## 2019-11-15 RX ORDER — LACTULOSE 10 G/15ML
15 SOLUTION ORAL
Qty: 0 | Refills: 0 | DISCHARGE
Start: 2019-11-15

## 2019-11-15 RX ORDER — SENNA PLUS 8.6 MG/1
2 TABLET ORAL AT BEDTIME
Refills: 0 | Status: DISCONTINUED | OUTPATIENT
Start: 2019-11-15 | End: 2019-11-16

## 2019-11-15 RX ORDER — ALBUTEROL 90 UG/1
2 AEROSOL, METERED ORAL
Qty: 1 | Refills: 3
Start: 2019-11-15 | End: 2020-03-13

## 2019-11-15 RX ORDER — SENNA PLUS 8.6 MG/1
2 TABLET ORAL
Qty: 60 | Refills: 0
Start: 2019-11-15 | End: 2019-12-14

## 2019-11-15 RX ORDER — BUDESONIDE AND FORMOTEROL FUMARATE DIHYDRATE 160; 4.5 UG/1; UG/1
0 AEROSOL RESPIRATORY (INHALATION)
Qty: 0 | Refills: 0 | DISCHARGE
Start: 2019-11-15

## 2019-11-15 RX ADMIN — FINASTERIDE 5 MILLIGRAM(S): 5 TABLET, FILM COATED ORAL at 11:03

## 2019-11-15 RX ADMIN — TIOTROPIUM BROMIDE 1 CAPSULE(S): 18 CAPSULE ORAL; RESPIRATORY (INHALATION) at 11:03

## 2019-11-15 RX ADMIN — Medication 2: at 10:53

## 2019-11-15 RX ADMIN — Medication 81 MILLIGRAM(S): at 11:02

## 2019-11-15 RX ADMIN — SENNA PLUS 2 TABLET(S): 8.6 TABLET ORAL at 21:43

## 2019-11-15 RX ADMIN — Medication 30 MILLILITER(S): at 17:36

## 2019-11-15 RX ADMIN — SODIUM CHLORIDE 50 MILLILITER(S): 9 INJECTION INTRAMUSCULAR; INTRAVENOUS; SUBCUTANEOUS at 05:16

## 2019-11-15 RX ADMIN — Medication 1 BOTTLE: at 10:47

## 2019-11-15 RX ADMIN — Medication 3 MILLILITER(S): at 23:20

## 2019-11-15 RX ADMIN — Medication 3 MILLILITER(S): at 17:04

## 2019-11-15 RX ADMIN — ENOXAPARIN SODIUM 30 MILLIGRAM(S): 100 INJECTION SUBCUTANEOUS at 11:03

## 2019-11-15 RX ADMIN — BUDESONIDE AND FORMOTEROL FUMARATE DIHYDRATE 2 PUFF(S): 160; 4.5 AEROSOL RESPIRATORY (INHALATION) at 17:10

## 2019-11-15 RX ADMIN — LACTULOSE 20 GRAM(S): 10 SOLUTION ORAL at 20:15

## 2019-11-15 RX ADMIN — Medication 3 MILLILITER(S): at 05:33

## 2019-11-15 RX ADMIN — BUDESONIDE AND FORMOTEROL FUMARATE DIHYDRATE 2 PUFF(S): 160; 4.5 AEROSOL RESPIRATORY (INHALATION) at 05:17

## 2019-11-15 RX ADMIN — Medication 20 MILLIGRAM(S): at 05:16

## 2019-11-15 RX ADMIN — Medication 3 MILLILITER(S): at 00:55

## 2019-11-15 RX ADMIN — Medication 3 MILLILITER(S): at 11:29

## 2019-11-15 RX ADMIN — Medication 30 MILLILITER(S): at 23:59

## 2019-11-15 NOTE — DISCHARGE NOTE NURSING/CASE MANAGEMENT/SOCIAL WORK - NSDCPEWEB_GEN_ALL_CORE
[facility] for Tobacco Control website --- [email or web address] website --- [email or web address]

## 2019-11-15 NOTE — DISCHARGE NOTE PROVIDER - HOSPITAL COURSE
89 year old male PMH HTN, DM2, atrial fibrillation, BIPAP with family c/o dry cough for three weeks with sob today. Pt denies headache, dizziness, blurred vision, light sensitivity, focal/distal weakness or numbness, palpitations, chest pain, nausea, vomiting, abd pain. Pt is very poor historian. CTA shows no PE, emphysematous changes.            COPD exacerbation:    - Improved.    - Continue current meds    - On NC            Constipation:    - Improved    - Continue prescribed regimen    - Borderline high TSH, follow up free t4            LETICIA:    - with CKD stage 3    - Cr stable            Acute cystitis with hematuria:    - Completed Abx.                    Chronic atrial fibrillation:    - Continue propanolol and ASA/ Plavix.    - not on AC for fall risk            HTN:    - Bp acceptable    - Continue current meds            DM:    - HgA1c: 6.0%    - Will not resume glipizide, resume metformin.                 DVTp:     - Lovenox             Disposition: MILVIA

## 2019-11-15 NOTE — DISCHARGE NOTE NURSING/CASE MANAGEMENT/SOCIAL WORK - PATIENT PORTAL LINK FT
You can access the FollowMyHealth Patient Portal offered by [facility] by registering at the following website: [email or web address]. By joining [employer]’s FollowMyHealth portal, you will also be able to view your health information using other applications (apps) compatible with our system.

## 2019-11-15 NOTE — DISCHARGE NOTE PROVIDER - NSDCMRMEDTOKEN_GEN_ALL_CORE_FT
acetaminophen 325 mg oral tablet: 2 tab(s) orally every 6 hours, As needed, Temp greater or equal to 38C (100.4F), Moderate Pain (4 - 6)  albuterol 90 mcg/inh inhalation powder: 2 puff(s) inhaled every 6 hours, As Needed -for shortness of breath and/or wheezing   amLODIPine 5 mg oral tablet: 1 tab(s) orally once a day  aspirin 81 mg oral tablet, chewable: 1 tab(s) orally once a day  bisacodyl 10 mg rectal suppository: 1 suppository(ies) rectal once a day, As Needed  budesonide-formoterol 160 mcg-4.5 mcg/inh inhalation aerosol:  inhaled   clopidogrel 75 mg oral tablet: 1 tab(s) orally once a day  finasteride 5 mg oral tablet: 1 tab(s) orally once a day  lactulose 10 g/15 mL oral syrup: 15 milliliter(s) orally 2 times a day, As Needed  metFORMIN 500 mg oral tablet: 1 tab(s) orally once a day  propranolol 20 mg oral tablet: 1 tab(s) orally once a day  senna oral tablet: 2 tab(s) orally once a day (at bedtime), As Needed -for constipation   tiotropium 18 mcg inhalation capsule: 1 cap(s) inhaled once a day

## 2019-11-15 NOTE — PROGRESS NOTE ADULT - SUBJECTIVE AND OBJECTIVE BOX
Patient is a 89y old  Male who presents with a chief complaint of Shortness of breath. (15 Nov 2019 11:29)      INTERVAL HPI/OVERNIGHT EVENTS:  Pt was seen and examined, no acute events.    MEDICATIONS  (STANDING):  albuterol/ipratropium for Nebulization 3 milliLiter(s) Nebulizer every 6 hours  amLODIPine   Tablet 5 milliGRAM(s) Oral daily  aspirin  chewable 81 milliGRAM(s) Oral daily  budesonide 160 MICROgram(s)/formoterol 4.5 MICROgram(s) Inhaler 2 Puff(s) Inhalation two times a day  dextrose 5%. 1000 milliLiter(s) (50 mL/Hr) IV Continuous <Continuous>  dextrose 50% Injectable 12.5 Gram(s) IV Push once  dextrose 50% Injectable 25 Gram(s) IV Push once  dextrose 50% Injectable 25 Gram(s) IV Push once  enoxaparin Injectable 30 milliGRAM(s) SubCutaneous daily  finasteride 5 milliGRAM(s) Oral daily  insulin lispro (HumaLOG) corrective regimen sliding scale   SubCutaneous three times a day before meals  lactulose Syrup 20 Gram(s) Oral once  propranolol 20 milliGRAM(s) Oral daily  senna 2 Tablet(s) Oral at bedtime  sodium chloride 0.9%. 1000 milliLiter(s) (50 mL/Hr) IV Continuous <Continuous>  tiotropium 18 MICROgram(s) Capsule 1 Capsule(s) Inhalation daily    MEDICATIONS  (PRN):  acetaminophen   Tablet .. 650 milliGRAM(s) Oral every 6 hours PRN Temp greater or equal to 38C (100.4F), Moderate Pain (4 - 6)  aluminum hydroxide/magnesium hydroxide/simethicone Suspension 30 milliLiter(s) Oral every 4 hours PRN Dyspepsia  bisacodyl Suppository 10 milliGRAM(s) Rectal daily PRN Constipation  dextrose 40% Gel 15 Gram(s) Oral once PRN Blood Glucose LESS THAN 70 milliGRAM(s)/deciliter  glucagon  Injectable 1 milliGRAM(s) IntraMuscular once PRN Glucose LESS THAN 70 milligrams/deciliter  guaiFENesin    Syrup 100 milliGRAM(s) Oral every 6 hours PRN Cough  traMADol 50 milliGRAM(s) Oral every 8 hours PRN Severe Pain (7 - 10)      Allergies  No Known Allergies        Vital Signs Last 24 Hrs  T(C): 36.1 (15 Nov 2019 16:15), Max: 36.8 (14 Nov 2019 23:25)  T(F): 96.9 (15 Nov 2019 16:15), Max: 98.3 (14 Nov 2019 23:25)  HR: 72 (15 Nov 2019 17:57) (72 - 101)  BP: 107/65 (15 Nov 2019 16:15) (105/70 - 122/80)  BP(mean): --  RR: 18 (15 Nov 2019 16:15) (17 - 20)  SpO2: 97% (15 Nov 2019 17:57) (97% - 100%)    PHYSICAL EXAM:  GENERAL: NAD  HEAD:  Atraumatic  EYES: PERRLA   NERVOUS SYSTEM:  Awake, confused  CHEST/LUNG: Clear  HEART: RRR  ABDOMEN: Soft, distended, tender  EXTREMITIES: no edema       LABS:                        12.4   8.69  )-----------( 178      ( 15 Nov 2019 08:16 )             39.9     11-15    139  |  104  |  22  ----------------------------<  103<H>  4.8   |  31  |  1.36<H>    Ca    9.2      15 Nov 2019 08:16          CAPILLARY BLOOD GLUCOSE      POCT Blood Glucose.: 101 mg/dL (15 Nov 2019 16:33)  POCT Blood Glucose.: 152 mg/dL (15 Nov 2019 10:51)  POCT Blood Glucose.: 103 mg/dL (15 Nov 2019 07:29)  POCT Blood Glucose.: 136 mg/dL (14 Nov 2019 21:34)      RADIOLOGY & ADDITIONAL TESTS:    Imaging Personally Reviewed:  [ ] YES  [ ] NO    Consultant(s) Notes Reviewed:  [ ] YES  [ ] NO    Care Discussed with Consultants/Other Providers [ ] YES  [ ] NO

## 2019-11-15 NOTE — DISCHARGE NOTE NURSING/CASE MANAGEMENT/SOCIAL WORK - NSDCPEEMAIL_GEN_ALL_CORE
[facility] for Tobacco Control email [email or web address]

## 2019-11-15 NOTE — PROGRESS NOTE ADULT - ASSESSMENT
89 year old male PMH HTN, DM2, atrial fibrillation, BIPAP with family c/o dry cough for three weeks with sob today. Pt denies headache, dizziness, blurred vision, light sensitivity, focal/distal weakness or numbness, palpitations, chest pain, nausea, vomiting, abd pain. Pt is very poor historian. CTA shows no PE, emphysematous changes.      COPD exacerbation:  - Improved overall, episode of hyperventilation likely from ab discomfort.  - Continue current meds  - On NC      Constipation:  - Improved, pt had multiple bowel movement today, but abdomen distended, mild tender, pt complains of dyspepsia  - Continue prescribed regimen, added mylanta  - GI following   - Borderline high TSH, Free T4 WNL.      LETICIA:  - with CKD stage 3  - Cr stable      Acute cystitis with hematuria:  - Completed Abx.          Chronic atrial fibrillation:  - Continue propanolol and ASA/ Plavix.  - not on AC for fall risk      HTN:  - Bp acceptable  - Continue current meds      DM:  - HgA1c: 6.0%  - Will not resume glipizide, resume metformin on dc        DVTp:   - Lovenox       Disposition: MILVIA

## 2019-11-15 NOTE — DISCHARGE NOTE PROVIDER - NSDCCPCAREPLAN_GEN_ALL_CORE_FT
PRINCIPAL DISCHARGE DIAGNOSIS  Diagnosis: COPD exacerbation  Assessment and Plan of Treatment: Continue current med      SECONDARY DISCHARGE DIAGNOSES  Diagnosis: Chronic atrial fibrillation  Assessment and Plan of Treatment: not on AC  rate controlled    Diagnosis: CKD (chronic kidney disease), stage III  Assessment and Plan of Treatment: cr stable    Diagnosis: Constipation, unspecified constipation type  Assessment and Plan of Treatment: improved  meds as prescribed

## 2019-11-15 NOTE — PROGRESS NOTE ADULT - REASON FOR ADMISSION
Shortness of breath.

## 2019-11-16 VITALS — OXYGEN SATURATION: 93 %

## 2019-11-16 PROCEDURE — 99239 HOSP IP/OBS DSCHRG MGMT >30: CPT

## 2019-11-16 RX ADMIN — Medication 3 MILLILITER(S): at 11:30

## 2019-11-16 RX ADMIN — TIOTROPIUM BROMIDE 1 CAPSULE(S): 18 CAPSULE ORAL; RESPIRATORY (INHALATION) at 11:59

## 2019-11-16 RX ADMIN — Medication 81 MILLIGRAM(S): at 11:58

## 2019-11-16 RX ADMIN — Medication 20 MILLIGRAM(S): at 06:16

## 2019-11-16 RX ADMIN — LACTULOSE 10 GRAM(S): 10 SOLUTION ORAL at 06:13

## 2019-11-16 RX ADMIN — LACTULOSE 10 GRAM(S): 10 SOLUTION ORAL at 17:00

## 2019-11-16 RX ADMIN — Medication 3 MILLILITER(S): at 05:19

## 2019-11-16 RX ADMIN — BUDESONIDE AND FORMOTEROL FUMARATE DIHYDRATE 2 PUFF(S): 160; 4.5 AEROSOL RESPIRATORY (INHALATION) at 06:14

## 2019-11-16 RX ADMIN — FINASTERIDE 5 MILLIGRAM(S): 5 TABLET, FILM COATED ORAL at 11:59

## 2019-11-16 RX ADMIN — AMLODIPINE BESYLATE 5 MILLIGRAM(S): 2.5 TABLET ORAL at 06:13

## 2019-11-16 RX ADMIN — ENOXAPARIN SODIUM 30 MILLIGRAM(S): 100 INJECTION SUBCUTANEOUS at 11:58

## 2019-11-16 RX ADMIN — Medication 3 MILLILITER(S): at 17:26

## 2019-11-20 DIAGNOSIS — J44.1 CHRONIC OBSTRUCTIVE PULMONARY DISEASE WITH (ACUTE) EXACERBATION: ICD-10-CM

## 2019-11-20 DIAGNOSIS — L89.621 PRESSURE ULCER OF LEFT HEEL, STAGE 1: ICD-10-CM

## 2019-11-20 DIAGNOSIS — N17.9 ACUTE KIDNEY FAILURE, UNSPECIFIED: ICD-10-CM

## 2019-11-20 DIAGNOSIS — Z79.02 LONG TERM (CURRENT) USE OF ANTITHROMBOTICS/ANTIPLATELETS: ICD-10-CM

## 2019-11-20 DIAGNOSIS — L89.152 PRESSURE ULCER OF SACRAL REGION, STAGE 2: ICD-10-CM

## 2019-11-20 DIAGNOSIS — Z79.1 LONG TERM (CURRENT) USE OF NON-STEROIDAL ANTI-INFLAMMATORIES (NSAID): ICD-10-CM

## 2019-11-20 DIAGNOSIS — K59.00 CONSTIPATION, UNSPECIFIED: ICD-10-CM

## 2019-11-20 DIAGNOSIS — Z87.891 PERSONAL HISTORY OF NICOTINE DEPENDENCE: ICD-10-CM

## 2019-11-20 DIAGNOSIS — Z79.82 LONG TERM (CURRENT) USE OF ASPIRIN: ICD-10-CM

## 2019-11-20 DIAGNOSIS — E44.0 MODERATE PROTEIN-CALORIE MALNUTRITION: ICD-10-CM

## 2019-11-20 DIAGNOSIS — N30.01 ACUTE CYSTITIS WITH HEMATURIA: ICD-10-CM

## 2019-11-20 DIAGNOSIS — N18.3 CHRONIC KIDNEY DISEASE, STAGE 3 (MODERATE): ICD-10-CM

## 2019-11-20 DIAGNOSIS — Z96.649 PRESENCE OF UNSPECIFIED ARTIFICIAL HIP JOINT: ICD-10-CM

## 2019-11-20 DIAGNOSIS — E11.9 TYPE 2 DIABETES MELLITUS WITHOUT COMPLICATIONS: ICD-10-CM

## 2019-11-20 DIAGNOSIS — I48.20 CHRONIC ATRIAL FIBRILLATION, UNSPECIFIED: ICD-10-CM

## 2019-11-20 DIAGNOSIS — E11.22 TYPE 2 DIABETES MELLITUS WITH DIABETIC CHRONIC KIDNEY DISEASE: ICD-10-CM

## 2019-11-20 DIAGNOSIS — R06.02 SHORTNESS OF BREATH: ICD-10-CM

## 2019-11-20 DIAGNOSIS — I12.9 HYPERTENSIVE CHRONIC KIDNEY DISEASE WITH STAGE 1 THROUGH STAGE 4 CHRONIC KIDNEY DISEASE, OR UNSPECIFIED CHRONIC KIDNEY DISEASE: ICD-10-CM

## 2019-11-20 DIAGNOSIS — Z79.4 LONG TERM (CURRENT) USE OF INSULIN: ICD-10-CM

## 2019-11-20 DIAGNOSIS — I47.2 VENTRICULAR TACHYCARDIA: ICD-10-CM

## 2019-12-24 ENCOUNTER — OUTPATIENT (OUTPATIENT)
Dept: OUTPATIENT SERVICES | Facility: HOSPITAL | Age: 84
LOS: 1 days | Discharge: HOME | End: 2019-12-24
Payer: MEDICARE

## 2019-12-24 DIAGNOSIS — R06.02 SHORTNESS OF BREATH: ICD-10-CM

## 2019-12-24 PROCEDURE — 71045 X-RAY EXAM CHEST 1 VIEW: CPT | Mod: 26

## 2020-05-31 NOTE — PATIENT PROFILE ADULT - NSPROPTRIGHTNOTIFY_GEN_A_NUR
Problem: Communication  Goal: The ability to communicate needs accurately and effectively will improve  Outcome: PROGRESSING AS EXPECTED    Patient effectively communicates needs. Call bell within reach.     Problem: Pain Management  Goal: Pain level will decrease to patient's comfort goal  Outcome: PROGRESSING AS EXPECTED   Discussed pain management and what medications have worked for her, provided alternatives for pain management.    declines

## 2020-06-10 ENCOUNTER — EMERGENCY (EMERGENCY)
Facility: HOSPITAL | Age: 85
LOS: 0 days | Discharge: HOME | End: 2020-06-10
Attending: EMERGENCY MEDICINE
Payer: MEDICARE

## 2020-06-10 VITALS
HEART RATE: 81 BPM | HEIGHT: 72 IN | TEMPERATURE: 98 F | OXYGEN SATURATION: 98 % | RESPIRATION RATE: 16 BRPM | SYSTOLIC BLOOD PRESSURE: 114 MMHG | DIASTOLIC BLOOD PRESSURE: 68 MMHG | WEIGHT: 169.98 LBS

## 2020-06-10 VITALS
HEART RATE: 80 BPM | SYSTOLIC BLOOD PRESSURE: 137 MMHG | TEMPERATURE: 98 F | OXYGEN SATURATION: 98 % | DIASTOLIC BLOOD PRESSURE: 84 MMHG | RESPIRATION RATE: 20 BRPM

## 2020-06-10 DIAGNOSIS — I49.9 CARDIAC ARRHYTHMIA, UNSPECIFIED: ICD-10-CM

## 2020-06-10 DIAGNOSIS — I10 ESSENTIAL (PRIMARY) HYPERTENSION: ICD-10-CM

## 2020-06-10 DIAGNOSIS — Z79.84 LONG TERM (CURRENT) USE OF ORAL HYPOGLYCEMIC DRUGS: ICD-10-CM

## 2020-06-10 DIAGNOSIS — R07.9 CHEST PAIN, UNSPECIFIED: ICD-10-CM

## 2020-06-10 DIAGNOSIS — I48.91 UNSPECIFIED ATRIAL FIBRILLATION: ICD-10-CM

## 2020-06-10 DIAGNOSIS — E11.9 TYPE 2 DIABETES MELLITUS WITHOUT COMPLICATIONS: ICD-10-CM

## 2020-06-10 LAB
ALBUMIN SERPL ELPH-MCNC: 3.1 G/DL — LOW (ref 3.3–5)
ALP SERPL-CCNC: 59 U/L — SIGNIFICANT CHANGE UP (ref 40–120)
ALT FLD-CCNC: 21 U/L — SIGNIFICANT CHANGE UP (ref 12–78)
ANION GAP SERPL CALC-SCNC: 7 MMOL/L — SIGNIFICANT CHANGE UP (ref 5–17)
AST SERPL-CCNC: 20 U/L — SIGNIFICANT CHANGE UP (ref 15–37)
BILIRUB SERPL-MCNC: 0.6 MG/DL — SIGNIFICANT CHANGE UP (ref 0.2–1.2)
BUN SERPL-MCNC: 24 MG/DL — HIGH (ref 7–23)
CALCIUM SERPL-MCNC: 9.2 MG/DL — SIGNIFICANT CHANGE UP (ref 8.5–10.1)
CHLORIDE SERPL-SCNC: 108 MMOL/L — SIGNIFICANT CHANGE UP (ref 96–108)
CO2 SERPL-SCNC: 25 MMOL/L — SIGNIFICANT CHANGE UP (ref 22–31)
CREAT SERPL-MCNC: 1.5 MG/DL — HIGH (ref 0.5–1.3)
GLUCOSE SERPL-MCNC: 106 MG/DL — HIGH (ref 70–99)
HCT VFR BLD CALC: 45 % — SIGNIFICANT CHANGE UP (ref 39–50)
HGB BLD-MCNC: 14.3 G/DL — SIGNIFICANT CHANGE UP (ref 13–17)
MCHC RBC-ENTMCNC: 28.4 PG — SIGNIFICANT CHANGE UP (ref 27–34)
MCHC RBC-ENTMCNC: 31.8 GM/DL — LOW (ref 32–36)
MCV RBC AUTO: 89.3 FL — SIGNIFICANT CHANGE UP (ref 80–100)
NRBC # BLD: 0 /100 WBCS — SIGNIFICANT CHANGE UP (ref 0–0)
PLATELET # BLD AUTO: 195 K/UL — SIGNIFICANT CHANGE UP (ref 150–400)
POTASSIUM SERPL-MCNC: 4.4 MMOL/L — SIGNIFICANT CHANGE UP (ref 3.5–5.3)
POTASSIUM SERPL-SCNC: 4.4 MMOL/L — SIGNIFICANT CHANGE UP (ref 3.5–5.3)
PROT SERPL-MCNC: 9 GM/DL — HIGH (ref 6–8.3)
RBC # BLD: 5.04 M/UL — SIGNIFICANT CHANGE UP (ref 4.2–5.8)
RBC # FLD: 12.8 % — SIGNIFICANT CHANGE UP (ref 10.3–14.5)
SODIUM SERPL-SCNC: 140 MMOL/L — SIGNIFICANT CHANGE UP (ref 135–145)
TROPONIN I SERPL-MCNC: <.015 NG/ML — SIGNIFICANT CHANGE UP (ref 0.01–0.04)
WBC # BLD: 7.78 K/UL — SIGNIFICANT CHANGE UP (ref 3.8–10.5)
WBC # FLD AUTO: 7.78 K/UL — SIGNIFICANT CHANGE UP (ref 3.8–10.5)

## 2020-06-10 PROCEDURE — 93010 ELECTROCARDIOGRAM REPORT: CPT

## 2020-06-10 PROCEDURE — 99285 EMERGENCY DEPT VISIT HI MDM: CPT

## 2020-06-10 NOTE — ED ADULT NURSE NOTE - NSIMPLEMENTINTERV_GEN_ALL_ED
Implemented All Fall with Harm Risk Interventions:  [place] to call system. Call bell, personal items and telephone within reach. Instruct patient to call for assistance. Room bathroom lighting operational. Non-slip footwear when patient is off stretcher. Physically safe environment: no spills, clutter or unnecessary equipment. Stretcher in lowest position, wheels locked, appropriate side rails in place. Provide visual cue, wrist band, yellow gown, etc. Monitor gait and stability. Monitor for mental status changes and reorient to person, place, and time. Review medications for side effects contributing to fall risk. Reinforce activity limits and safety measures with patient and family. Provide visual clues: red socks.

## 2020-06-10 NOTE — ED ADULT NURSE REASSESSMENT NOTE - NS ED NURSE REASSESS COMMENT FT1
Pt had all of his IVs removed, pt educated about discharge, bertin pts daughter was spoken with, Earline is setting up a ride for the patient to get home. report given to Parvin at the change of shift.

## 2020-06-10 NOTE — ED ADULT NURSE NOTE - ED STAT RN HANDOFF DETAILS
report taken from NEMO cox. pt. laying comfortably in bed. pt. awaiting ambulance p/u. v/s stable. no acute distress noted, will continue to monitor., report taken from NEMO cox. pt. laying comfortably in bed. pt. awaiting ambulance p/u. v/s stable. no acute distress noted, will continue to monitor., as per dr. graves, no urine , xray needed and okay to go home report taken from NEMO cox. pt. laying comfortably in bed. pt. awaiting ambulance p/u. v/s stable. no acute distress noted, will continue to monitor., as per dr. graves, no urine needed and okay to go home

## 2020-06-10 NOTE — ED PROVIDER NOTE - OBJECTIVE STATEMENT
Pt here with c/o headache and chest pain     resolved on way to the ED.  EMS said HR was low, but EMS strip indicates HR at 60s    Now no complaints.  Pt wants to go home.

## 2020-06-10 NOTE — ED PROVIDER NOTE - PATIENT PORTAL LINK FT
You can access the FollowMyHealth Patient Portal offered by [facility] by registering at the following website: [email or web address]. By joining [facility]’s FollowMyHealth portal, you will also be able to view your health information using other applications (apps) compatible with our system.

## 2020-06-10 NOTE — ED PROVIDER NOTE - PHYSICAL EXAMINATION
Mcgrath:  General: No distress.  Mentation at baseline.   HEENT: WNL  Chest/Lungs: CTAB, No wheeze, No retractions, No increased work of breathing, Normal rate  Heart: S1S2 RRR, No M/R/G, Pules equal Bilaterally in upper and lower extremities distally  Abd: soft, NT/ND, No guarding, No rebound.  No hernias, no palpable masses.  Extrem: FROM in all joints, no significant edema noted, No ulcers.  Cap refil < 2sec.  Skin: No rash noted, warm dry.  Neuro:  Grossly normal.  No difficulty ambulating. No focal deficits.  Psychiatric: No evidence of delusions. No SI/HI.

## 2020-06-10 NOTE — ED PROVIDER NOTE - CLINICAL SUMMARY MEDICAL DECISION MAKING FREE TEXT BOX
Chest pain without findings.  Prior admission for same and negative workup with extensive w/u  FAmily prefers to have home.  will discharge

## 2020-06-10 NOTE — ED ADULT TRIAGE NOTE - CHIEF COMPLAINT QUOTE
per EMS pt's family states after smoking pt started complaining of chest pain per EMS pt's family states after smoking cigar pt started complaining of chest pain, denies at triage. Pt c/o dizziness on scene with chronic non productive coughing

## 2020-06-10 NOTE — ED ADULT NURSE NOTE - CHIEF COMPLAINT QUOTE
per EMS pt's family states after smoking cigar pt started complaining of chest pain, denies at triage. Pt c/o dizziness on scene with chronic non productive coughing

## 2020-06-11 PROCEDURE — 71045 X-RAY EXAM CHEST 1 VIEW: CPT | Mod: 26

## 2020-07-28 NOTE — ED ADULT TRIAGE NOTE - TEMPERATURE IN CELSIUS (DEGREES C)
FAMILY HISTORY:  Family history of brain cancer  Family history of diabetes mellitus  Family history of hypertension  Family history of hypertension 36.7

## 2020-08-19 ENCOUNTER — EMERGENCY (EMERGENCY)
Facility: HOSPITAL | Age: 85
LOS: 0 days | Discharge: HOME | End: 2020-08-19
Attending: STUDENT IN AN ORGANIZED HEALTH CARE EDUCATION/TRAINING PROGRAM
Payer: MEDICARE

## 2020-08-19 VITALS
TEMPERATURE: 98 F | SYSTOLIC BLOOD PRESSURE: 136 MMHG | DIASTOLIC BLOOD PRESSURE: 74 MMHG | OXYGEN SATURATION: 95 % | HEART RATE: 96 BPM | WEIGHT: 169.98 LBS | RESPIRATION RATE: 20 BRPM | HEIGHT: 74 IN

## 2020-08-19 VITALS
OXYGEN SATURATION: 100 % | RESPIRATION RATE: 22 BRPM | TEMPERATURE: 98 F | HEART RATE: 80 BPM | SYSTOLIC BLOOD PRESSURE: 121 MMHG | DIASTOLIC BLOOD PRESSURE: 81 MMHG

## 2020-08-19 DIAGNOSIS — I48.91 UNSPECIFIED ATRIAL FIBRILLATION: ICD-10-CM

## 2020-08-19 DIAGNOSIS — I10 ESSENTIAL (PRIMARY) HYPERTENSION: ICD-10-CM

## 2020-08-19 DIAGNOSIS — Z79.82 LONG TERM (CURRENT) USE OF ASPIRIN: ICD-10-CM

## 2020-08-19 DIAGNOSIS — I49.9 CARDIAC ARRHYTHMIA, UNSPECIFIED: ICD-10-CM

## 2020-08-19 DIAGNOSIS — Z96.649 PRESENCE OF UNSPECIFIED ARTIFICIAL HIP JOINT: ICD-10-CM

## 2020-08-19 DIAGNOSIS — N30.01 ACUTE CYSTITIS WITH HEMATURIA: ICD-10-CM

## 2020-08-19 DIAGNOSIS — N40.0 BENIGN PROSTATIC HYPERPLASIA WITHOUT LOWER URINARY TRACT SYMPTOMS: ICD-10-CM

## 2020-08-19 DIAGNOSIS — E11.9 TYPE 2 DIABETES MELLITUS WITHOUT COMPLICATIONS: ICD-10-CM

## 2020-08-19 DIAGNOSIS — J44.9 CHRONIC OBSTRUCTIVE PULMONARY DISEASE, UNSPECIFIED: ICD-10-CM

## 2020-08-19 DIAGNOSIS — Z79.02 LONG TERM (CURRENT) USE OF ANTITHROMBOTICS/ANTIPLATELETS: ICD-10-CM

## 2020-08-19 DIAGNOSIS — R33.9 RETENTION OF URINE, UNSPECIFIED: ICD-10-CM

## 2020-08-19 LAB
ANION GAP SERPL CALC-SCNC: 8 MMOL/L — SIGNIFICANT CHANGE UP (ref 5–17)
APPEARANCE UR: ABNORMAL
BACTERIA # UR AUTO: ABNORMAL
BASOPHILS # BLD AUTO: 0.05 K/UL — SIGNIFICANT CHANGE UP (ref 0–0.2)
BASOPHILS NFR BLD AUTO: 0.5 % — SIGNIFICANT CHANGE UP (ref 0–2)
BILIRUB UR-MCNC: NEGATIVE — SIGNIFICANT CHANGE UP
BUN SERPL-MCNC: 15 MG/DL — SIGNIFICANT CHANGE UP (ref 7–23)
CALCIUM SERPL-MCNC: 9.2 MG/DL — SIGNIFICANT CHANGE UP (ref 8.5–10.1)
CHLORIDE SERPL-SCNC: 104 MMOL/L — SIGNIFICANT CHANGE UP (ref 96–108)
CO2 SERPL-SCNC: 29 MMOL/L — SIGNIFICANT CHANGE UP (ref 22–31)
COLOR SPEC: ABNORMAL
CREAT SERPL-MCNC: 1.29 MG/DL — SIGNIFICANT CHANGE UP (ref 0.5–1.3)
DIFF PNL FLD: ABNORMAL
EOSINOPHIL # BLD AUTO: 0.07 K/UL — SIGNIFICANT CHANGE UP (ref 0–0.5)
EOSINOPHIL NFR BLD AUTO: 0.7 % — SIGNIFICANT CHANGE UP (ref 0–6)
GLUCOSE SERPL-MCNC: 113 MG/DL — HIGH (ref 70–99)
GLUCOSE UR QL: NEGATIVE MG/DL — SIGNIFICANT CHANGE UP
HCT VFR BLD CALC: 41.8 % — SIGNIFICANT CHANGE UP (ref 39–50)
HGB BLD-MCNC: 13.1 G/DL — SIGNIFICANT CHANGE UP (ref 13–17)
IMM GRANULOCYTES NFR BLD AUTO: 0.7 % — SIGNIFICANT CHANGE UP (ref 0–1.5)
INR BLD: 1.22 RATIO — HIGH (ref 0.88–1.16)
KETONES UR-MCNC: ABNORMAL
LEUKOCYTE ESTERASE UR-ACNC: ABNORMAL
LYMPHOCYTES # BLD AUTO: 1.53 K/UL — SIGNIFICANT CHANGE UP (ref 1–3.3)
LYMPHOCYTES # BLD AUTO: 15.3 % — SIGNIFICANT CHANGE UP (ref 13–44)
MCHC RBC-ENTMCNC: 28.8 PG — SIGNIFICANT CHANGE UP (ref 27–34)
MCHC RBC-ENTMCNC: 31.3 GM/DL — LOW (ref 32–36)
MCV RBC AUTO: 91.9 FL — SIGNIFICANT CHANGE UP (ref 80–100)
MONOCYTES # BLD AUTO: 0.93 K/UL — HIGH (ref 0–0.9)
MONOCYTES NFR BLD AUTO: 9.3 % — SIGNIFICANT CHANGE UP (ref 2–14)
NEUTROPHILS # BLD AUTO: 7.37 K/UL — SIGNIFICANT CHANGE UP (ref 1.8–7.4)
NEUTROPHILS NFR BLD AUTO: 73.5 % — SIGNIFICANT CHANGE UP (ref 43–77)
NITRITE UR-MCNC: NEGATIVE — SIGNIFICANT CHANGE UP
NRBC # BLD: 0 /100 WBCS — SIGNIFICANT CHANGE UP (ref 0–0)
PH UR: 6 — SIGNIFICANT CHANGE UP (ref 5–8)
PLATELET # BLD AUTO: 229 K/UL — SIGNIFICANT CHANGE UP (ref 150–400)
POTASSIUM SERPL-MCNC: 4.2 MMOL/L — SIGNIFICANT CHANGE UP (ref 3.5–5.3)
POTASSIUM SERPL-SCNC: 4.2 MMOL/L — SIGNIFICANT CHANGE UP (ref 3.5–5.3)
PROT UR-MCNC: 500 MG/DL
PROTHROM AB SERPL-ACNC: 14 SEC — HIGH (ref 10.6–13.6)
RBC # BLD: 4.55 M/UL — SIGNIFICANT CHANGE UP (ref 4.2–5.8)
RBC # FLD: 13.1 % — SIGNIFICANT CHANGE UP (ref 10.3–14.5)
RBC CASTS # UR COMP ASSIST: >50 /HPF (ref 0–4)
SODIUM SERPL-SCNC: 141 MMOL/L — SIGNIFICANT CHANGE UP (ref 135–145)
SP GR SPEC: 1.02 — SIGNIFICANT CHANGE UP (ref 1.01–1.02)
UROBILINOGEN FLD QL: NEGATIVE MG/DL — SIGNIFICANT CHANGE UP
WBC # BLD: 10.02 K/UL — SIGNIFICANT CHANGE UP (ref 3.8–10.5)
WBC # FLD AUTO: 10.02 K/UL — SIGNIFICANT CHANGE UP (ref 3.8–10.5)
WBC UR QL: ABNORMAL

## 2020-08-19 PROCEDURE — 76775 US EXAM ABDO BACK WALL LIM: CPT | Mod: 26

## 2020-08-19 PROCEDURE — 99284 EMERGENCY DEPT VISIT MOD MDM: CPT

## 2020-08-19 RX ORDER — FINASTERIDE 5 MG/1
1 TABLET, FILM COATED ORAL
Qty: 0 | Refills: 0 | DISCHARGE

## 2020-08-19 RX ORDER — FINASTERIDE 5 MG/1
5 TABLET, FILM COATED ORAL DAILY
Refills: 0 | Status: DISCONTINUED | OUTPATIENT
Start: 2020-08-19 | End: 2020-08-19

## 2020-08-19 RX ORDER — ATENOLOL 25 MG/1
25 TABLET ORAL ONCE
Refills: 0 | Status: COMPLETED | OUTPATIENT
Start: 2020-08-19 | End: 2020-08-19

## 2020-08-19 RX ORDER — PROPRANOLOL HCL 160 MG
1 CAPSULE, EXTENDED RELEASE 24HR ORAL
Qty: 0 | Refills: 0 | DISCHARGE

## 2020-08-19 RX ORDER — ASPIRIN/CALCIUM CARB/MAGNESIUM 324 MG
81 TABLET ORAL DAILY
Refills: 0 | Status: DISCONTINUED | OUTPATIENT
Start: 2020-08-19 | End: 2020-08-19

## 2020-08-19 RX ORDER — ATENOLOL 25 MG/1
0 TABLET ORAL
Qty: 0 | Refills: 0 | DISCHARGE

## 2020-08-19 RX ORDER — GABAPENTIN 400 MG/1
0 CAPSULE ORAL
Qty: 0 | Refills: 0 | DISCHARGE

## 2020-08-19 RX ORDER — METFORMIN HYDROCHLORIDE 850 MG/1
1 TABLET ORAL
Qty: 0 | Refills: 0 | DISCHARGE

## 2020-08-19 RX ORDER — GABAPENTIN 400 MG/1
300 CAPSULE ORAL ONCE
Refills: 0 | Status: COMPLETED | OUTPATIENT
Start: 2020-08-19 | End: 2020-08-19

## 2020-08-19 RX ORDER — TAMSULOSIN HYDROCHLORIDE 0.4 MG/1
1 CAPSULE ORAL
Qty: 0 | Refills: 0 | DISCHARGE

## 2020-08-19 RX ORDER — CEPHALEXIN 500 MG
1 CAPSULE ORAL
Qty: 56 | Refills: 0
Start: 2020-08-19 | End: 2020-09-01

## 2020-08-19 RX ORDER — TAMSULOSIN HYDROCHLORIDE 0.4 MG/1
0.4 CAPSULE ORAL ONCE
Refills: 0 | Status: COMPLETED | OUTPATIENT
Start: 2020-08-19 | End: 2020-08-19

## 2020-08-19 RX ORDER — CLOPIDOGREL BISULFATE 75 MG/1
1 TABLET, FILM COATED ORAL
Qty: 0 | Refills: 0 | DISCHARGE

## 2020-08-19 RX ORDER — CLOPIDOGREL BISULFATE 75 MG/1
75 TABLET, FILM COATED ORAL ONCE
Refills: 0 | Status: COMPLETED | OUTPATIENT
Start: 2020-08-19 | End: 2020-08-19

## 2020-08-19 RX ADMIN — FINASTERIDE 5 MILLIGRAM(S): 5 TABLET, FILM COATED ORAL at 12:11

## 2020-08-19 RX ADMIN — TAMSULOSIN HYDROCHLORIDE 0.4 MILLIGRAM(S): 0.4 CAPSULE ORAL at 12:11

## 2020-08-19 RX ADMIN — CLOPIDOGREL BISULFATE 75 MILLIGRAM(S): 75 TABLET, FILM COATED ORAL at 12:11

## 2020-08-19 RX ADMIN — Medication 81 MILLIGRAM(S): at 12:13

## 2020-08-19 RX ADMIN — GABAPENTIN 300 MILLIGRAM(S): 400 CAPSULE ORAL at 12:12

## 2020-08-19 NOTE — ED PROVIDER NOTE - CARE PROVIDERS DIRECT ADDRESSES
[email or web address]

## 2020-08-19 NOTE — ED PROVIDER NOTE - CLINICAL SUMMARY MEDICAL DECISION MAKING FREE TEXT BOX
89yo M on AC, w/ indwelling Uriostegui since 8/4 pw gross hematuria x 1 day, foul smelling urine and purulent urethral d/c x 4 days. AFVSS. Plan: Exchange Uriostegui, bladder irrigation, UA/C, CBC, BMP, INR, Renal / Bladder US. Re-eval. 89yo M on AC, w/ indwelling Uriostegui since 8/4 pw gross hematuria x 1 day, foul smelling urine and purulent urethral d/c x 4 days. AFVSS. Plan: Exchange Uriostegui, bladder irrigation, UA/C, CBC, BMP, INR, Renal / Bladder US. Re-eval. WBC, renal function, INR w/o significant abnormalities. 89yo M on AC, w/ indwelling Uriostegui since 8/4 pw gross hematuria x 1 day, foul smelling urine and purulent urethral d/c x 4 days. AFVSS. Plan: Exchange Uriostegui, bladder irrigation, UA/C, CBC, BMP, INR, Renal / Bladder US. Re-eval. WBC, renal function, INR w/o significant abnormalities. US w/o significant abnormalities of bladder, B/L kidneys. UA w/ evidence of infection. UC sent, pending. S/p bladder irrigation, Uriostegui draining well, urine clear w/ pink tinge, appears light red when collected in Uriostegui bag. Stable for d/c home. Change abx to Keflex, stop Macrobid / Bactrim. Instructed to keep outpatient f/u appt w/ Uro on 8/25, given additional f/u w/ Uro (Dr Davis) as needed. Return signs and symptoms discussed w/ pt and his family. They understand and agree w/ this plan.

## 2020-08-19 NOTE — ED PROVIDER NOTE - OBJECTIVE STATEMENT
89yo M w/ PMH DM, Afib on AC, COPD, enlarged prostate w/ indwelling weeks presents to ED w/ daughter for gross hematuria. Per daughter, Weeks initially placed 8/4 d/t acute urinary retention. Pt was admitted to [facility] x 1wk. Pt home x 1wk. Sunday night w/ onset of purulent discharge from urethra, swelling of glans, 2-3 small blood clots in Weeks bag, foul smelling urine. Daughter brought pt to ED, diagnosed w/ UTI and d/c'd w/ Bactrim & Macrobid. Per daughter, urine darkened, and since 2100 last night, urine grossly bloody. ROS otherwise neg.     NKDA. PSH hip, shoulder. PMH as above. Meds as listed. Per daughter, Uro was Dr Franklin in [place] but pt has not been seen by him x many yrs. W/ new Uro appt 8/25 (pt has not yet seen Uro since 8/4) 89yo M w/ PMH DM, Afib on AC, COPD, enlarged prostate w/ indwelling weeks presents to ED w/ daughter for gross hematuria. Per daughter, Weeks initially placed 8/4 d/t acute urinary retention. Pt was admitted to [facility] x 1wk. Pt home x 1wk. Sunday night w/ onset of purulent discharge from urethra, swelling of glans, 2-3 small blood clots in Weeks bag, foul smelling urine. Daughter brought pt to ED, diagnosed w/ UTI and d/c'd w/ Bactrim & Macrobid. Per daughter, urine darkened, and since 2100 last night, urine grossly bloody. ROS otherwise neg.     NKDA. PSH hip, shoulder. PMH as above. Meds as listed. Per daughter, Uro was Dr Franklin in [place] but pt has not been seen by him x many yrs. W/ new Uro appt scheduled for next Tuesday 8/25 (pt has not yet seen Uro since 8/4)

## 2020-08-19 NOTE — ED PROVIDER NOTE - PROGRESS NOTE DETAILS
Bladder irrigation ongoing, mostly clear w/ slight pink tinge. Daughter requesting pt be given AM meds, meal.

## 2020-08-19 NOTE — ED ADULT NURSE NOTE - CHIEF COMPLAINT QUOTE
Pt olegario from home for second opinion about UTI diagnoses. as per EMS pt d/c'd from [facility] last night, daughter called this morning because she needed second opinion about diagnoses. H/O DM, HTN, AFIB, ENLARGED PROSTATE, SACRAL ULCER. Pt PW Uriostegui catheter with bloody urine.

## 2020-08-19 NOTE — ED PROVIDER NOTE - PHYSICAL EXAMINATION
GEN: Awake, alert, sleeping but easily arousable, NAD.  HEAD AND NECK: NC/AT. Airway patent. Neck supple.   EYES:  Clear b/l. EOMI. PERRL.   ENT: Moist mucus membranes.   CARDIAC: Regular rate, regular rhythm. No evident pedal edema.    RESP/CHEST: Normal respiratory effort with no use of accessory muscles or retractions. Clear throughout on auscultation.  ABD: Soft, non-distended, non-tender. No rebound, no guarding.   : Purulent discharge from urethra, urine in Uriostegui bag gross hematuria.   BACK: No midline spinal TTP. No CVAT.   EXTREMITIES: Moving all extremities with no apparent deformities.   SKIN: Warm, dry, intact normal color. No rash.   NEURO: AOx3, CN II-XII grossly intact, no focal deficits.

## 2020-08-19 NOTE — ED ADULT NURSE NOTE - OBJECTIVE STATEMENT
Received patient in bed 5. AOX4. Pt olegario from home for second opinion about UTI diagnoses. as per EMS pt d/c'd from [facility] last night, daughter called this morning because she needed second opinion about diagnoses. H/O DM, HTN, AFIB, ENLARGED PROSTATE, Unstageable Sacral ulcer with DSG in place. Pt PW Uriostegui catheter with bloody urine 16FR inserted 8/4/2020. Daughter at bedside. Explained shift change and waiting to be seen

## 2020-08-19 NOTE — ED PROVIDER NOTE - PATIENT PORTAL LINK FT
You can access the FollowMyHealth Patient Portal offered by [facility] by registering at the following website: [email or web address]. By joining [employer]’s FollowMyHealth portal, you will also be able to view your health information using other applications (apps) compatible with our system.

## 2020-08-19 NOTE — ED ADULT NURSE REASSESSMENT NOTE - NS ED NURSE REASSESS COMMENT FT1
Assigned at shift change. Report endorsed to oncoming RN patient met with no interventions completed. Safety checks compld this shift/Safety rounds completed hourly.  Fall +skin precs in place. Any issues endorsed to oncoming RN for follow up

## 2020-08-19 NOTE — ED ADULT TRIAGE NOTE - CHIEF COMPLAINT QUOTE
Pt olegario from home for second opinion about UTI diagnoses. as per EMS pt d/c'd from [facility] last night, daughter called this morning because she needed second opinion about diagnoses. H/O DM, HTN, AFIB, ENLARGED PROSTATE, SACRAL ULCER. Pt PW Uriostegui catheter with bloody urine.

## 2020-08-19 NOTE — ED PROVIDER NOTE - CARE PROVIDER_API CALL
Esdras Davis  UROLOGY  [address], [address] [place] [address]  Phone: [phone]  Fax: [phone]  Follow Up Time: 4-6 Days

## 2020-08-20 LAB
CULTURE RESULTS: SIGNIFICANT CHANGE UP
SPECIMEN SOURCE: SIGNIFICANT CHANGE UP

## 2021-02-14 NOTE — ED ADULT NURSE NOTE - NIH STROKE SCALE: 2. BEST GAZE, QM
Subjective 75-year-old gentleman with history of atrial fibrillation.  Patient had open heart surgery several years ago.  He was told at the time that he was allergic to heparin he does not know the exact side effects he had.  Patient presently feels better he is to be discharged he needs long-term anticoagulation.  On examination patient is well-developed well-built male in no acute distress.  Patient has scar of prior chest surgery.  Heart tones no murmurs lungs clear abdomen negative.  Labs (Last four charted values)  WBC                  H 15.8 (OCT 25) H 18.8 (OCT 24) H 14.8 (OCT 24) 10.2 (OCT 20)   Hgb                  L 11.2 (OCT 25) L 11.7 (OCT 24) L 10.9 (OCT 24) 12.6 (OCT 20)   Hct                  L 34 (OCT 25) L 35 (OCT 24) L 33 (OCT 24) 37 (OCT 20)   Plt                  L 113 (OCT 25) L 122 (OCT 24) L 118 (OCT 24) L 97 (OCT 20)   Na                   139 (OCT 25) 138 (OCT 24) 140 (OCT 22) 136 (OCT 20)   K                    H 5.5 (OCT 25) 5.1 (OCT 24) 4.1 (OCT 22) 4.5 (OCT 20)   CO2                  28 (OCT 25) 28 (OCT 24) 27 (OCT 22) 27 (OCT 20)   Cl                   105 (OCT 25) 104 (OCT 24) 105 (OCT 22) 102 (OCT 20)   Cr                   H 1.40 (OCT 25) H 1.32 (OCT 24) 1.06 (OCT 22) 1.04 (OCT 20)   BUN                  H 47 (OCT 25) H 36 (OCT 24) H 22 (OCT 22) H 22 (OCT 20)   Glucose              H 269 (OCT 25) H 256 (OCT 24) H 127 (OCT 22) H 297 (OCT 20)   Mg                   1.9 (OCT 25) 2.0 (OCT 24) L 1.5 (OCT 22) 1.6 (OCT 20)   Ca                   9.4 (OCT 25) 8.8 (OCT 24) 8.8 (OCT 22) 8.8 (OCT 20)   PT                   H 12.8 (OCT 25) H 12.1 (OCT 24) H 13.8 (OCT 23) H 20.6 (OCT 22)   INR                  H 1.2 (OCT 25) H 1.2 (OCT 24) H 1.4 (OCT 23) H 2.1 (OCT 22)   PTT                  29 (OCT 19)   Troponin             H 0.09 (OCT 19)     Impression:and recommendations patient with remote history of HIT.  Presently, runs platelet counts in the 100,000 range.  Medications  presently patient is on Eliquis and he is willing to continue with this.  We have advised him to continue to avoid heparin Lovenox or heparin related products as even if the antibody test is negative now that could not mean that he cannot have a reaction to it.  Patient can be discharged on Eliquis.  If there is any questions he will contact us.  Thank you            Electronically Signed On 10.25.2019 14:32  ___________________________________________________   BHAVIK Chaparro MD.     (0) Normal

## 2021-05-09 NOTE — CONSULT NOTE ADULT - ASSESSMENT
89  M with  HTN, DM, CKDIII, BPH, CAD, ? A fib brought in for fever, chills, sweats, generalized weakness, on further questioning he stated that he has rhinorrhea but no cough and has L flank pain but no dysuria.   febrile to 100.5, tachy to 98 exam with L CVAT  WBC: 16.9, RVP negative  u/a just 6-10 WBC  CXT no consolidation    * fever, leukocytosis, sepsis, due to L pyelonephritis vs URI    * f/u the blood and urine cultures  * c/w ceftriaxone 1 g qd, started 6/21, now day 2  * DC azithro tomorrow ( will be 3 days)  * if fever or worsening status will need chest/abd/pelvis CT  * monitor WBC and renal function 89  M with  HTN, DM, CKDIII, BPH, CAD, ? A fib brought in for fever, chills, sweats, generalized weakness, on further questioning he stated that he has rhinorrhea and ?cough and has occasional back pain but no dysuria.   febrile to 100.5, tachy to 98 exam with L CVAT  WBC: 16.9, RVP negative  u/a just 6-10 WBC  CXT no consolidation    * fever, leukocytosis, sepsis, due to L pyelonephritis vs URI    * f/u the blood and urine cultures  * c/w ceftriaxone 1 g qd, started 6/21, now day 2  * DC azithro tomorrow ( will be 3 days)  * if fever or worsening status will need chest/abd/pelvis CT  * monitor WBC and renal function alert and awake

## 2021-06-22 NOTE — PHYSICAL THERAPY INITIAL EVALUATION ADULT - IMPAIRMENTS FOUND, PT EVAL
Reyna Stover)  EndocrinologyMetabDiabetes; Internal Medicine  [address], [address]  [place] [address]  Phone: [phone]  Fax: [phone]  Follow Up Time:     Libman, Richard B (MD)  Neurology; Vascular Neurology  [address]  [place] [address]  Phone: [phone]  Fax: [phone]  Follow Up Time:     Leanne Moreau)  Critical Care Medicine  [address]  [place] [address]  Phone: [phone]  Fax: [phone]  Follow Up Time:     Chan Alaniz; MBBS)  Infectious Disease; Internal Medicine  [address] [place] [address]  Phone: [phone]  Fax: [phone]  Follow Up Time:     Colin Mckeon)  Vascular Surgery  [address]  [place] [address]  Phone: [phone]  Fax: [phone]  Follow Up Time:     Nohemi Werner  Physical Medicine and Rehabilitation  [address] [place] [address]  Phone: [phone]  Fax: [phone]  Follow Up Time:   
ROM/gait, locomotion, and balance/muscle strength

## 2021-07-31 NOTE — ED ADULT NURSE NOTE - PMH
Transport at cart side to take patient up to unit. Patient is awake and alert for transport, belongings placed on cart with patient.    Arrhythmia, long term    Diabetes    Dislocation of hip, left, closed  - repeated 3 times, last on 01/21/16  Hypertension

## 2022-01-01 NOTE — PROGRESS NOTE ADULT - PROBLEM/PLAN-6
Pt reports right flank pain radiating to RLQ. Reports frequent bm this am. (not loose) Denies fever. Reports distant hx of kidney stone.  Denies hematuria or any urinary symptoms at this time. DISPLAY PLAN FREE TEXT

## 2022-01-11 NOTE — ED ADULT NURSE NOTE - RESPIRATORY ASSESSMENT
MRI RIGHT KNEE WITHOUT CONTRAST



INDICATION: 

PAIN   SWELLING.



COMPARISON: 

None available.



TECHNIQUE:

Multiplanar, multisequence MR images were obtained.



FINDINGS:

ACL: Partial tear with increased signal and slightly wavy morphology 

PCL: No significant abnormality. 

MEDIAL MENISCUS: No significant abnormality.  

LATERAL MENISCUS: No significant abnormality. 



DISTAL QUADRICEPS TENDON: No significant abnormality.  

PATELLAR TENDON: No significant abnormality. 

MCL: No significant abnormality.  

LCL: No significant abnormality. 

DISTAL IT BAND: No significant abnormality.   



POSTEROLATERAL CORNER: No significant abnormality. 



PATELLOFEMORAL ALIGNMENT: No significant abnormality. 



ARTICULAR CARTILAGE: No significant chondrosis or articular cartilage defect.

JOINT SPACE: No significant joint effusion or synovitis. No significant popliteal cyst. No intra-delmer
cular bodies.



BONES: Moderate bone marrow edema along the anterior aspect of medial femoral condyle, medial and lat
eral tibial plateaus and posterior aspect of medial tibial plateau characteristic for contusions. Foc
al impaction fracture along the anterior aspect of the medial femoral condyle best seen on sagittal i
mage 13. No osseous lesion.

SUBCUTANEOUS SOFT TISSUES: No significant abnormality.



ADDITIONAL FINDINGS: None.



IMPRESSION:

1. Partial tear/sprain of the ACL. These correlate with biomechanical testing.

2. Impaction fracture along anterior aspect medial femoral condyle and bone contusions of the femur a
nd tibia described above.







Signer Name: Jason Jones MD 

Signed: 1/11/2022 11:39 AM

Workstation Name: [id number]
[id number]

## 2022-01-19 NOTE — DISCHARGE NOTE NURSING/CASE MANAGEMENT/SOCIAL WORK - SOCIAL WORKER'S NAME
----- Message from Dinah Pedroza MD sent at 1/19/2022  9:41 AM EST -----  I called the patient and asked him to stop testosterone until he treats sleep apnea and his hemoglobin/ hematocrit improve  He said he understood but his preferred language is [place]  Could you please make sure he understood  He will repeat labs before his next visit 
Lis Bird SW

## 2023-05-05 NOTE — ED ADULT NURSE NOTE - NSFALLRSKASSESSDT_ED_ALL_ED
Addended by: BONILLA JEREZ on: 5/5/2023 04:16 PM     Modules accepted: Orders     Pt had a Transurethral microwave therapy yesterday morning and c/o urinary rentention since. pt appears uncomfortable. Denies PMH 21-Jun-2019 17:16

## 2024-07-21 NOTE — ED PROVIDER NOTE - INTERPRETATION
Radiology tech called informing RN that pt Is having anxiety due to the close quarters of the MRI machine. Pt requesting mediation to calm anxiety. MD notified.   abnormal